# Patient Record
Sex: FEMALE | Race: OTHER | HISPANIC OR LATINO | Employment: OTHER | URBAN - METROPOLITAN AREA
[De-identification: names, ages, dates, MRNs, and addresses within clinical notes are randomized per-mention and may not be internally consistent; named-entity substitution may affect disease eponyms.]

---

## 2018-04-13 LAB
BILIRUB UR QL STRIP: 1 MG/DL
CLARITY UR: CLEAR
COLOR UR: YELLOW
COMMENT (HISTORICAL): ABNORMAL
GLUCOSE UR STRIP-MCNC: NEGATIVE MG/DL
HGB UR QL STRIP.AUTO: ABNORMAL
KETONES UR STRIP-MCNC: 50 MG/DL
LEUKOCYTE ESTERASE UR QL STRIP: 25
NITRITE UR QL STRIP: ABNORMAL
PH UR STRIP.AUTO: 8 [PH] (ref 4.5–8)
PREGNANCY TEST URINE (HISTORICAL): NEGATIVE
PROT UR STRIP-MCNC: 15 MG/DL
SP GR UR STRIP.AUTO: 1 (ref 1–1.04)
UROBILINOGEN UR QL STRIP.AUTO: NEGATIVE MG/DL (ref 0–1)

## 2018-07-29 ENCOUNTER — HOSPITAL ENCOUNTER (EMERGENCY)
Facility: HOSPITAL | Age: 43
Discharge: HOME | End: 2018-07-29
Attending: EMERGENCY MEDICINE | Admitting: EMERGENCY MEDICINE
Payer: MEDICARE

## 2018-07-29 VITALS
SYSTOLIC BLOOD PRESSURE: 120 MMHG | WEIGHT: 149 LBS | HEIGHT: 69 IN | DIASTOLIC BLOOD PRESSURE: 90 MMHG | BODY MASS INDEX: 22.07 KG/M2 | HEART RATE: 60 BPM | RESPIRATION RATE: 18 BRPM | OXYGEN SATURATION: 100 % | TEMPERATURE: 97.6 F

## 2018-07-29 DIAGNOSIS — R10.13 EPIGASTRIC PAIN: Primary | ICD-10-CM

## 2018-07-29 LAB
ALBUMIN SERPL BCP-MCNC: 4.1 G/DL (ref 3–5.2)
ALP SERPL-CCNC: 107 U/L (ref 43–122)
ALT SERPL W P-5'-P-CCNC: 32 U/L (ref 9–52)
ANION GAP SERPL CALCULATED.3IONS-SCNC: 9 MMOL/L (ref 5–14)
AST SERPL W P-5'-P-CCNC: 41 U/L (ref 14–36)
BILIRUB SERPL-MCNC: 0.4 MG/DL
BUN SERPL-MCNC: 13 MG/DL (ref 5–25)
CALCIUM SERPL-MCNC: 9.2 MG/DL (ref 8.4–10.2)
CHLORIDE SERPL-SCNC: 100 MMOL/L (ref 97–108)
CO2 SERPL-SCNC: 25 MMOL/L (ref 22–30)
CREAT SERPL-MCNC: 1.04 MG/DL (ref 0.6–1.2)
ERYTHROCYTE [DISTWIDTH] IN BLOOD BY AUTOMATED COUNT: 13.7 %
GFR SERPL CREATININE-BSD FRML MDRD: 67 ML/MIN/1.73SQ M
GLUCOSE SERPL-MCNC: 93 MG/DL (ref 70–99)
HCT VFR BLD AUTO: 42.4 % (ref 36–46)
HGB BLD-MCNC: 14.3 G/DL (ref 12–16)
LIPASE SERPL-CCNC: 168 U/L (ref 23–300)
LYMPHOCYTES # BLD AUTO: 13 % (ref 20–50)
LYMPHOCYTES # BLD AUTO: 2.22 THOUSAND/UL (ref 0.5–4)
MCH RBC QN AUTO: 33 PG (ref 26–34)
MCHC RBC AUTO-ENTMCNC: 33.8 G/DL (ref 31–36)
MCV RBC AUTO: 98 FL (ref 80–100)
MONOCYTES # BLD AUTO: 0.34 THOUSAND/UL (ref 0.2–0.9)
MONOCYTES NFR BLD AUTO: 2 % (ref 1–10)
NEUTS SEG # BLD: 14.54 THOUSAND/UL (ref 1.8–7.8)
NEUTS SEG NFR BLD AUTO: 85 %
PLATELET # BLD AUTO: 269 THOUSANDS/UL (ref 150–450)
PLATELET BLD QL SMEAR: ADEQUATE
PMV BLD AUTO: 8 FL (ref 8.9–12.7)
POTASSIUM SERPL-SCNC: 4.9 MMOL/L (ref 3.6–5)
PROT SERPL-MCNC: 7.3 G/DL (ref 5.9–8.4)
RBC # BLD AUTO: 4.35 MILLION/UL (ref 4–5.2)
RBC MORPH BLD: NORMAL
SODIUM SERPL-SCNC: 134 MMOL/L (ref 137–147)
TOTAL CELLS COUNTED SPEC: 100
WBC # BLD AUTO: 17.1 THOUSAND/UL (ref 4.5–11)

## 2018-07-29 PROCEDURE — 99284 EMERGENCY DEPT VISIT MOD MDM: CPT

## 2018-07-29 PROCEDURE — 96376 TX/PRO/DX INJ SAME DRUG ADON: CPT

## 2018-07-29 PROCEDURE — C9113 INJ PANTOPRAZOLE SODIUM, VIA: HCPCS | Performed by: EMERGENCY MEDICINE

## 2018-07-29 PROCEDURE — 96375 TX/PRO/DX INJ NEW DRUG ADDON: CPT

## 2018-07-29 PROCEDURE — 85027 COMPLETE CBC AUTOMATED: CPT | Performed by: EMERGENCY MEDICINE

## 2018-07-29 PROCEDURE — 80053 COMPREHEN METABOLIC PANEL: CPT | Performed by: EMERGENCY MEDICINE

## 2018-07-29 PROCEDURE — 96374 THER/PROPH/DIAG INJ IV PUSH: CPT

## 2018-07-29 PROCEDURE — 83690 ASSAY OF LIPASE: CPT | Performed by: EMERGENCY MEDICINE

## 2018-07-29 PROCEDURE — 85007 BL SMEAR W/DIFF WBC COUNT: CPT | Performed by: EMERGENCY MEDICINE

## 2018-07-29 PROCEDURE — 36415 COLL VENOUS BLD VENIPUNCTURE: CPT | Performed by: EMERGENCY MEDICINE

## 2018-07-29 PROCEDURE — 96361 HYDRATE IV INFUSION ADD-ON: CPT

## 2018-07-29 RX ORDER — METRONIDAZOLE 500 MG/1
500 TABLET ORAL 3 TIMES DAILY
COMMUNITY
End: 2019-06-13

## 2018-07-29 RX ORDER — MIRTAZAPINE 15 MG/1
15 TABLET, ORALLY DISINTEGRATING ORAL
COMMUNITY
End: 2019-08-24

## 2018-07-29 RX ORDER — TRAMADOL HYDROCHLORIDE 50 MG/1
50 TABLET ORAL EVERY 6 HOURS PRN
COMMUNITY
End: 2019-06-13

## 2018-07-29 RX ORDER — DICYCLOMINE HYDROCHLORIDE 10 MG/1
20 CAPSULE ORAL ONCE
Status: COMPLETED | OUTPATIENT
Start: 2018-07-29 | End: 2018-07-29

## 2018-07-29 RX ORDER — PANTOPRAZOLE SODIUM 40 MG/1
INJECTION, POWDER, FOR SOLUTION INTRAVENOUS
Status: DISCONTINUED
Start: 2018-07-29 | End: 2018-07-29 | Stop reason: HOSPADM

## 2018-07-29 RX ORDER — ONDANSETRON 2 MG/ML
4 INJECTION INTRAMUSCULAR; INTRAVENOUS ONCE
Status: COMPLETED | OUTPATIENT
Start: 2018-07-29 | End: 2018-07-29

## 2018-07-29 RX ORDER — DICYCLOMINE HYDROCHLORIDE 10 MG/1
CAPSULE ORAL
Status: DISCONTINUED
Start: 2018-07-29 | End: 2018-07-29 | Stop reason: HOSPADM

## 2018-07-29 RX ORDER — METOCLOPRAMIDE HYDROCHLORIDE 5 MG/ML
10 INJECTION INTRAMUSCULAR; INTRAVENOUS ONCE
Status: DISCONTINUED | OUTPATIENT
Start: 2018-07-29 | End: 2018-07-29 | Stop reason: HOSPADM

## 2018-07-29 RX ORDER — PANTOPRAZOLE SODIUM 40 MG/1
40 INJECTION, POWDER, FOR SOLUTION INTRAVENOUS ONCE
Status: COMPLETED | OUTPATIENT
Start: 2018-07-29 | End: 2018-07-29

## 2018-07-29 RX ORDER — DEXLANSOPRAZOLE 60 MG/1
60 CAPSULE, DELAYED RELEASE ORAL DAILY
COMMUNITY
End: 2019-06-13

## 2018-07-29 RX ORDER — MORPHINE SULFATE 4 MG/ML
4 INJECTION, SOLUTION INTRAMUSCULAR; INTRAVENOUS ONCE
Status: COMPLETED | OUTPATIENT
Start: 2018-07-29 | End: 2018-07-29

## 2018-07-29 RX ORDER — DICYCLOMINE HCL 20 MG
20 TABLET ORAL 4 TIMES DAILY
COMMUNITY
End: 2019-08-24

## 2018-07-29 RX ORDER — PROCHLORPERAZINE MALEATE 10 MG
10 TABLET ORAL EVERY 6 HOURS PRN
COMMUNITY
End: 2019-06-13

## 2018-07-29 RX ORDER — ONDANSETRON 2 MG/ML
INJECTION INTRAMUSCULAR; INTRAVENOUS
Status: DISCONTINUED
Start: 2018-07-29 | End: 2018-07-29 | Stop reason: HOSPADM

## 2018-07-29 RX ORDER — MORPHINE SULFATE 4 MG/ML
INJECTION, SOLUTION INTRAMUSCULAR; INTRAVENOUS
Status: DISCONTINUED
Start: 2018-07-29 | End: 2018-07-29 | Stop reason: HOSPADM

## 2018-07-29 RX ORDER — ZIPRASIDONE HYDROCHLORIDE 80 MG/1
80 CAPSULE ORAL
COMMUNITY
End: 2019-08-24

## 2018-07-29 RX ORDER — ALPRAZOLAM 1 MG/1
1 TABLET ORAL 3 TIMES DAILY PRN
COMMUNITY
End: 2019-06-13

## 2018-07-29 RX ORDER — METOCLOPRAMIDE 10 MG/1
10 TABLET ORAL EVERY 6 HOURS
Qty: 20 TABLET | Refills: 0 | Status: SHIPPED | OUTPATIENT
Start: 2018-07-29 | End: 2019-06-13

## 2018-07-29 RX ADMIN — DICYCLOMINE HYDROCHLORIDE 20 MG: 10 CAPSULE ORAL at 03:15

## 2018-07-29 RX ADMIN — ONDANSETRON 4 MG: 2 INJECTION INTRAMUSCULAR; INTRAVENOUS at 04:32

## 2018-07-29 RX ADMIN — PANTOPRAZOLE SODIUM 40 MG: 40 INJECTION, POWDER, FOR SOLUTION INTRAVENOUS at 03:15

## 2018-07-29 RX ADMIN — MORPHINE SULFATE 4 MG: 4 INJECTION, SOLUTION INTRAMUSCULAR; INTRAVENOUS at 03:22

## 2018-07-29 RX ADMIN — SODIUM CHLORIDE 1000 ML: 9 INJECTION, SOLUTION INTRAVENOUS at 03:07

## 2018-07-29 RX ADMIN — FAMOTIDINE 20 MG: 10 INJECTION INTRAVENOUS at 04:07

## 2018-07-29 RX ADMIN — ONDANSETRON 4 MG: 2 INJECTION, SOLUTION INTRAMUSCULAR; INTRAVENOUS at 03:15

## 2018-07-29 RX ADMIN — MORPHINE SULFATE 4 MG: 4 INJECTION, SOLUTION INTRAMUSCULAR; INTRAVENOUS at 04:33

## 2018-07-29 NOTE — ED PROVIDER NOTES
History  Chief Complaint   Patient presents with    Abdominal Pain     pt  reports abd  pain/nausea/vomiting for several days   pt  reports this has been going on since she was 26years old     Patient is a 53-year-old female with a significant past medical history of chronic gastritis who presents with acute exacerbation of her pain that started approximately 8 hours prior to arrival   Patient is complaining of a sharp 9/10 and epigastric pain does not radiate  Nothing worsened patient states that within the makes it better is when she comes to the ER and receives Zofran, Bentyl, morphine, and Pepcid  Patient states that she has had this pain since he was 25years old  She has seen multiple doctors in the past and is currently scheduled to see sleep doctors due to an elevated thyroid as well as chronic chest pain for which she is now scheduled for an echo and stress test   Patient states that over the last week her symptoms have gotten worse vomiting daily  Today pain he can became even more severe did not take any medications for it  Came to the ER for her medication as requested noted above  Patient denies any chest pain, shortness of breath, fever, chills, headache  States that is the same pain that she has always had since she was 21  Patient does is closed issues currently own p o  Flagyl for a bacterial vaginosis infection  No recent travel or questionable food exposure  Prior to Admission Medications   Prescriptions Last Dose Informant Patient Reported? Taking?    ALPRAZolam (XANAX) 1 mg tablet   Yes Yes   Sig: Take 1 mg by mouth 3 (three) times a day as needed for anxiety   Prenatal Vit-Fe Fumarate-FA (PRENATAL 19 PO)   Yes Yes   Sig: Take 1 tablet by mouth daily   dexlansoprazole (DEXILANT) 60 MG capsule   Yes Yes   Sig: Take 60 mg by mouth daily   dicyclomine (BENTYL) 20 mg tablet   Yes Yes   Sig: Take 20 mg by mouth 4 (four) times a day   metroNIDAZOLE (FLAGYL) 500 mg tablet   Yes Yes   Sig: Take 500 mg by mouth 3 (three) times a day   mirtazapine (REMERON SOL-TAB) 15 mg disintegrating tablet   Yes Yes   Sig: Take 15 mg by mouth daily at bedtime   prochlorperazine (COMPAZINE) 10 mg tablet   Yes Yes   Sig: Take 10 mg by mouth every 6 (six) hours as needed for nausea or vomiting   traMADol (ULTRAM) 50 mg tablet   Yes Yes   Sig: Take 50 mg by mouth every 6 (six) hours as needed for moderate pain   ziprasidone (GEODON) 80 mg capsule   Yes Yes   Sig: Take 80 mg by mouth daily at bedtime      Facility-Administered Medications: None       Past Medical History:   Diagnosis Date    Cardiomyopathy ([facility] [address] )     Chest pain     Diarrhea     Elevated transaminase measurement     History of vitamin D deficiency     Irritable bowel syndrome (IBS)     with Constipation    Malaise and fatigue     Nutritional deficiency     Overactive thyroid gland     Pain in joint involving multiple sites     Palpitations     Sweating abnormality     Vaginal infection     Vitamin D insufficiency        Past Surgical History:   Procedure Laterality Date    TUBAL LIGATION      UPPER GASTROINTESTINAL ENDOSCOPY         History reviewed  No pertinent family history  I have reviewed and agree with the history as documented  Social History   Substance Use Topics    Smoking status: Current Every Day Smoker     Packs/day: 0 50    Smokeless tobacco: Never Used    Alcohol use Yes      Comment: occasionally        Review of Systems   Constitutional: Positive for activity change  Negative for fever  HENT: Negative  Eyes: Negative  Respiratory: Negative  Negative for shortness of breath  Cardiovascular: Negative  Negative for chest pain  Gastrointestinal: Positive for abdominal pain, nausea and vomiting  Negative for diarrhea  Endocrine: Negative  Genitourinary: Negative  Musculoskeletal: Negative  Skin: Negative  Allergic/Immunologic: Negative  Neurological: Negative      Hematological: Negative  Psychiatric/Behavioral: Negative  All other systems reviewed and are negative  Physical Exam  Physical Exam   Constitutional: She is oriented to person, place, and time  She appears well-developed and well-nourished  Smells of tobacco   HENT:   Head: Normocephalic and atraumatic  Right Ear: External ear normal    Left Ear: External ear normal    Nose: Nose normal    Mouth/Throat: Oropharynx is clear and moist    Moist mucous membranes   Eyes: Conjunctivae are normal  Pupils are equal, round, and reactive to light  Neck: Normal range of motion  Neck supple  Cardiovascular: Normal rate, regular rhythm, normal heart sounds and intact distal pulses  Pulmonary/Chest: Effort normal and breath sounds normal    Abdominal: Soft  Bowel sounds are normal  She exhibits no distension and no mass  There is no tenderness  There is no rebound and no guarding  No hernia  Soft abdomen no guarding no rigidity  No point tenderness palpation      Musculoskeletal: Normal range of motion  Neurological: She is alert and oriented to person, place, and time  Skin: Skin is warm and dry  Capillary refill takes less than 2 seconds  Psychiatric: Her mood appears anxious  She is agitated  Intermediate  Nursing note and vitals reviewed        Vital Signs  ED Triage Vitals   Temperature Pulse Respirations Blood Pressure SpO2   07/29/18 0256 07/29/18 0256 07/29/18 0256 07/29/18 0256 07/29/18 0256   97 6 °F (36 4 °C) 63 (!) 24 (!) 173/101 100 %      Temp Source Heart Rate Source Patient Position - Orthostatic VS BP Location FiO2 (%)   07/29/18 0256 07/29/18 0325 07/29/18 0256 07/29/18 0256 --   Temporal Monitor Lying Left arm       Pain Score       07/29/18 0256       Worst Possible Pain           Vitals:    07/29/18 0256 07/29/18 0325 07/29/18 0412 07/29/18 0546   BP: (!) 173/101 (!) 170/111 131/81 120/90   Pulse: 63 71 65 60   Patient Position - Orthostatic VS: Lying Lying Lying Lying       Visual Acuity      ED Medications  Medications   pantoprazole (PROTONIX) 40 mg injection **AcuDose Override Pull** (  Not Given 7/29/18 0315)   dicyclomine (BENTYL) 10 mg capsule **AcuDose Override Pull** (  Not Given 7/29/18 0315)   ondansetron (ZOFRAN) 4 mg/2 mL injection **AcuDose Override Pull** (  Not Given 7/29/18 0315)   metoclopramide (REGLAN) injection 10 mg (10 mg Intravenous Not Given 7/29/18 0545)   morphine (PF) 4 mg/mL injection 4 mg (4 mg Intravenous Given 7/29/18 0322)   ondansetron (ZOFRAN) injection 4 mg (4 mg Intravenous Given 7/29/18 0315)   pantoprazole (PROTONIX) injection 40 mg (40 mg Intravenous Given 7/29/18 0315)   sodium chloride 0 9 % bolus 1,000 mL (0 mL Intravenous Stopped 7/29/18 0541)   dicyclomine (BENTYL) capsule 20 mg (20 mg Oral Given 7/29/18 0315)   famotidine (PEPCID) 20 mg/2 mL injection **AcuDose Override Pull** (20 mg Intravenous Given 7/29/18 0407)   morphine (PF) 4 mg/mL injection 4 mg (4 mg Intravenous Given 7/29/18 0433)   ondansetron (ZOFRAN) injection 4 mg (4 mg Intravenous Given 7/29/18 0432)       Diagnostic Studies  Results Reviewed     Procedure Component Value Units Date/Time    Lipase [[id number]]  (Normal) Collected:  07/29/18 0306    Lab Status:  Final result Specimen:  Blood from Arm, Right Updated:  07/29/18 0325     Lipase 168 u/L     Narrative:       Hemolysis    Comprehensive metabolic panel [[id number]]  (Abnormal) Collected:  07/29/18 0306    Lab Status:  Final result Specimen:  Blood from Arm, Right Updated:  07/29/18 0325     Sodium 134 (L) mmol/L      Potassium 4 9 mmol/L      Chloride 100 mmol/L      CO2 25 mmol/L      Anion Gap 9 mmol/L      BUN 13 mg/dL      Creatinine 1 04 mg/dL      Glucose 93 mg/dL      Calcium 9 2 mg/dL      AST 41 (H) U/L      ALT 32 U/L      Alkaline Phosphatase 107 U/L      Total Protein 7 3 g/dL      Albumin 4 1 g/dL      Total Bilirubin 0 40 mg/dL      eGFR 67 ml/min/1 73sq m     Narrative:       Hemolysis  National Kidney Disease Education Program recommendations are as follows:  GFR calculation is accurate only with a steady state creatinine  Chronic Kidney disease less than 60 ml/min/1 73 sq  meters  Kidney failure less than 15 ml/min/1 73 sq  meters  CBC and differential [[id number]]  (Abnormal) Collected:  07/29/18 0305    Lab Status:  Final result Specimen:  Blood from Arm, Right Updated:  07/29/18 0318     WBC 17 10 (H) Thousand/uL      RBC 4 35 Million/uL      Hemoglobin 14 3 g/dL      Hematocrit 42 4 %      MCV 98 fL      MCH 33 0 pg      MCHC 33 8 g/dL      RDW 13 7 %      MPV 8 0 (L) fL      Platelets 565 Thousands/uL                  No orders to display              Procedures  Procedures       Phone Contacts  ED Phone Contact    ED Course  ED Course as of Jul 29 0556   Sun Jul 29, 2018   5516 Patient feeling improved  Ready to go home  States no nausea medicine at home, insurance company will not pay for zofran, doesn't like compazine, agreeable with reglan  MDM  Number of Diagnoses or Management Options  Epigastric pain:   Diagnosis management comments: Patient is a 46-year-old female with a history of gastritis who presents with acute exacerbation her symptoms today  Patient with no point tenderness on exam   Labs reviewed mild elevation in her white count  Differential could include: At the but no point tenderness no fever no signs of exam on blood work to make me this primary diagnosis  Most likely gastritis, feeling improved after meds discharge with Reglan and follow up with her doctor return to the ER for any concerns         Amount and/or Complexity of Data Reviewed  Clinical lab tests: ordered and reviewed  Tests in the medicine section of CPT®: reviewed and ordered  Decide to obtain previous medical records or to obtain history from someone other than the patient: yes  Obtain history from someone other than the patient: yes  Review and summarize past medical records: yes  Independent visualization of images, tracings, or specimens: yes      CritCare Time    Disposition  Final diagnoses:   Epigastric pain     Time reflects when diagnosis was documented in both MDM as applicable and the Disposition within this note     Time User Action Codes Description Comment    7/29/2018  5:32 AM Silvia Estrada Add [R10 13] Epigastric pain       ED Disposition     ED Disposition Condition Comment    Discharge  Abelino Alfaro discharge to home/self care      Condition at discharge: Stable        Follow-up Information     Follow up With Specialties Details Why [address] [facility]   Norberto Vance [address]  [phone]            Discharge Medication List as of 7/29/2018  5:33 AM      START taking these medications    Details   metoclopramide (REGLAN) 10 mg tablet Take 1 tablet (10 mg total) by mouth every 6 (six) hours, Starting Sun 7/29/2018, Print         CONTINUE these medications which have NOT CHANGED    Details   ALPRAZolam (XANAX) 1 mg tablet Take 1 mg by mouth 3 (three) times a day as needed for anxiety, Historical Med      dexlansoprazole (DEXILANT) 60 MG capsule Take 60 mg by mouth daily, Historical Med      dicyclomine (BENTYL) 20 mg tablet Take 20 mg by mouth 4 (four) times a day, Historical Med      metroNIDAZOLE (FLAGYL) 500 mg tablet Take 500 mg by mouth 3 (three) times a day, Historical Med      mirtazapine (REMERON SOL-TAB) 15 mg disintegrating tablet Take 15 mg by mouth daily at bedtime, Historical Med      Prenatal Vit-Fe Fumarate-FA (PRENATAL 19 PO) Take 1 tablet by mouth daily, Historical Med      prochlorperazine (COMPAZINE) 10 mg tablet Take 10 mg by mouth every 6 (six) hours as needed for nausea or vomiting, Historical Med      traMADol (ULTRAM) 50 mg tablet Take 50 mg by mouth every 6 (six) hours as needed for moderate pain, Historical Med      ziprasidone (GEODON) 80 mg capsule Take 80 mg by mouth daily at bedtime, Historical Med           No discharge procedures on file      ED Provider  Electronically Signed by           Josi Sawyer MD  07/29/18 3735

## 2018-07-29 NOTE — ED NOTES
Patient medicated with pepcid as ordered  Reports that her pain is somewhat better but that the gas is what is causing her discomfort now       Gemini Cardoso, RICHARD  07/29/18 8314

## 2018-07-29 NOTE — ED NOTES
Patient removed her BP cuff and pulse oximetry probe and does not want it back on at this time  Patient was medicated with morphine and zofran (repeat dose) as ordered and Dr Kemi Woodall in to speak with patient       Radha Galaviz RN  07/29/18 9184

## 2018-07-29 NOTE — ED NOTES
Patient reports that she is feeling better and is asking when she can go home  Dr Emily Ferguson made aware and in to speak with patient prior to discharge       Jolena Kayser, RN  07/29/18 9153

## 2018-07-29 NOTE — DISCHARGE INSTRUCTIONS
Abdominal Pain   WHAT YOU NEED TO KNOW:   Abdominal pain can be dull, achy, or sharp  You may have pain in one area of your abdomen, or in your entire abdomen  Your pain may be caused by a condition such as constipation, food sensitivity or poisoning, infection, or a blockage  Abdominal pain can also be from a hernia, appendicitis, or an ulcer  Liver, gallbladder, or kidney conditions can also cause abdominal pain  The cause of your abdominal pain may be unknown  DISCHARGE INSTRUCTIONS:   Return to the emergency department if:   · You have new chest pain or shortness of breath  · You have pulsing pain in your upper abdomen or lower back that suddenly becomes constant  · Your pain is in the right lower abdominal area and worsens with movement  · You have a fever over 100 4°F (38°C) or shaking chills  · You are vomiting and cannot keep food or liquids down  · Your pain does not improve or gets worse over the next 8 to 12 hours  · You see blood in your vomit or bowel movements, or they look black and tarry  · Your skin or the whites of your eyes turn yellow  · You are a woman and have a large amount of vaginal bleeding that is not your monthly period  Contact your healthcare provider if:   · You have pain in your lower back  · You are a man and have pain in your testicles  · You have pain when you urinate  · You have questions or concerns about your condition or care  Follow up with your healthcare provider within 24 hours or as directed:  Write down your questions so you remember to ask them during your visits  Medicines:   · Medicines  may be given to calm your stomach and prevent vomiting or to decrease pain  Ask how to take pain medicine safely  · Take your medicine as directed  Contact your healthcare provider if you think your medicine is not helping or if you have side effects  Tell him of her if you are allergic to any medicine   Keep a list of the medicines, vitamins, and herbs you take  Include the amounts, and when and why you take them  Bring the list or the pill bottles to follow-up visits  Carry your medicine list with you in case of an emergency  © 2017 [address] [employer] Information is for End User's use only and may not be sold, redistributed or otherwise used for commercial purposes  All illustrations and images included in CareNotes® are the copyrighted property of [employer]  or Robbi Gallo  The above information is an  only  It is not intended as medical advice for individual conditions or treatments  Talk to your doctor, nurse or pharmacist before following any medical regimen to see if it is safe and effective for you

## 2018-07-29 NOTE — ED NOTES
Patient requesting pepcid - states she "needs pepcid"! Dr Virlinda Sever made aware - ordered - not in pyxis - supervisor obtaining same       Shaun Pineda RN  07/29/18 1537

## 2018-07-29 NOTE — ED NOTES
Patient up to use the bathroom- states that she is nauseous - passing flautus  While morphine being given, patient states that she didn't feel it - explained to patient medication being given slowly  Patient restless in bed       Saadia Shine RN  07/29/18 2647

## 2019-04-24 ENCOUNTER — HOSPITAL ENCOUNTER (EMERGENCY)
Facility: HOSPITAL | Age: 44
Discharge: HOME | End: 2019-04-24
Attending: EMERGENCY MEDICINE | Admitting: EMERGENCY MEDICINE
Payer: MEDICARE

## 2019-04-24 VITALS
WEIGHT: 159.61 LBS | BODY MASS INDEX: 23.57 KG/M2 | TEMPERATURE: 98.8 F | SYSTOLIC BLOOD PRESSURE: 148 MMHG | HEART RATE: 59 BPM | OXYGEN SATURATION: 99 % | DIASTOLIC BLOOD PRESSURE: 76 MMHG | RESPIRATION RATE: 16 BRPM

## 2019-04-24 DIAGNOSIS — K29.70 GASTRITIS: Primary | ICD-10-CM

## 2019-04-24 DIAGNOSIS — I10 HTN (HYPERTENSION): ICD-10-CM

## 2019-04-24 LAB
ALBUMIN SERPL BCP-MCNC: 4.3 G/DL (ref 3–5.2)
ALP SERPL-CCNC: 83 U/L (ref 43–122)
ALT SERPL W P-5'-P-CCNC: 28 U/L (ref 9–52)
ANION GAP SERPL CALCULATED.3IONS-SCNC: 10 MMOL/L (ref 5–14)
AST SERPL W P-5'-P-CCNC: 47 U/L (ref 14–36)
BASOPHILS # BLD AUTO: 0.13 THOUSAND/UL (ref 0–0.1)
BASOPHILS NFR MAR MANUAL: 1 % (ref 0–1)
BILIRUB SERPL-MCNC: 0.9 MG/DL
BILIRUB UR QL STRIP: NEGATIVE
BUN SERPL-MCNC: 11 MG/DL (ref 5–25)
CALCIUM SERPL-MCNC: 8.9 MG/DL (ref 8.4–10.2)
CHLORIDE SERPL-SCNC: 100 MMOL/L (ref 97–108)
CLARITY UR: ABNORMAL
CO2 SERPL-SCNC: 22 MMOL/L (ref 22–30)
COLOR UR: YELLOW
CREAT SERPL-MCNC: 0.59 MG/DL (ref 0.6–1.2)
ERYTHROCYTE [DISTWIDTH] IN BLOOD BY AUTOMATED COUNT: 13.5 %
EXT PREG TEST URINE: NORMAL
GFR SERPL CREATININE-BSD FRML MDRD: 113 ML/MIN/1.73SQ M
GLUCOSE SERPL-MCNC: 135 MG/DL (ref 70–99)
GLUCOSE UR STRIP-MCNC: NEGATIVE MG/DL
HCT VFR BLD AUTO: 47.7 % (ref 36–46)
HGB BLD-MCNC: 15.6 G/DL (ref 12–16)
HGB UR QL STRIP.AUTO: NEGATIVE
KETONES UR STRIP-MCNC: NEGATIVE MG/DL
LEUKOCYTE ESTERASE UR QL STRIP: NEGATIVE
LIPASE SERPL-CCNC: 116 U/L (ref 23–300)
LYMPHOCYTES # BLD AUTO: 1.61 THOUSAND/UL (ref 0.5–4)
LYMPHOCYTES # BLD AUTO: 12 % (ref 25–45)
MCH RBC QN AUTO: 32.7 PG (ref 26–34)
MCHC RBC AUTO-ENTMCNC: 32.7 G/DL (ref 31–36)
MCV RBC AUTO: 100 FL (ref 80–100)
MONOCYTES # BLD AUTO: 0.13 THOUSAND/UL (ref 0.2–0.9)
MONOCYTES NFR BLD AUTO: 1 % (ref 1–10)
NEUTS BAND NFR BLD MANUAL: 1 % (ref 0–8)
NEUTS SEG # BLD: 11.52 THOUSAND/UL (ref 1.8–7.8)
NEUTS SEG NFR BLD AUTO: 85 %
NITRITE UR QL STRIP: NEGATIVE
PH UR STRIP.AUTO: 8 [PH]
PLATELET # BLD AUTO: 233 THOUSANDS/UL (ref 150–450)
PLATELET BLD QL SMEAR: ADEQUATE
PMV BLD AUTO: 8.3 FL (ref 8.9–12.7)
POTASSIUM SERPL-SCNC: 4.9 MMOL/L (ref 3.6–5)
PROT SERPL-MCNC: 7.4 G/DL (ref 5.9–8.4)
PROT UR STRIP-MCNC: NEGATIVE MG/DL
RBC # BLD AUTO: 4.77 MILLION/UL (ref 4–5.2)
RBC MORPH BLD: NORMAL
SODIUM SERPL-SCNC: 132 MMOL/L (ref 137–147)
SP GR UR STRIP.AUTO: 1.01 (ref 1–1.04)
TOTAL CELLS COUNTED SPEC: 100
UROBILINOGEN UA: NEGATIVE MG/DL
WBC # BLD AUTO: 13.4 THOUSAND/UL (ref 4.5–11)

## 2019-04-24 PROCEDURE — 81025 URINE PREGNANCY TEST: CPT | Performed by: EMERGENCY MEDICINE

## 2019-04-24 PROCEDURE — 99284 EMERGENCY DEPT VISIT MOD MDM: CPT

## 2019-04-24 PROCEDURE — 96374 THER/PROPH/DIAG INJ IV PUSH: CPT

## 2019-04-24 PROCEDURE — 85007 BL SMEAR W/DIFF WBC COUNT: CPT | Performed by: EMERGENCY MEDICINE

## 2019-04-24 PROCEDURE — 99284 EMERGENCY DEPT VISIT MOD MDM: CPT | Performed by: EMERGENCY MEDICINE

## 2019-04-24 PROCEDURE — 83690 ASSAY OF LIPASE: CPT | Performed by: EMERGENCY MEDICINE

## 2019-04-24 PROCEDURE — 96361 HYDRATE IV INFUSION ADD-ON: CPT

## 2019-04-24 PROCEDURE — 85027 COMPLETE CBC AUTOMATED: CPT | Performed by: EMERGENCY MEDICINE

## 2019-04-24 PROCEDURE — 80053 COMPREHEN METABOLIC PANEL: CPT | Performed by: EMERGENCY MEDICINE

## 2019-04-24 PROCEDURE — 96375 TX/PRO/DX INJ NEW DRUG ADDON: CPT

## 2019-04-24 PROCEDURE — 36415 COLL VENOUS BLD VENIPUNCTURE: CPT | Performed by: EMERGENCY MEDICINE

## 2019-04-24 RX ORDER — ONDANSETRON 2 MG/ML
4 INJECTION INTRAMUSCULAR; INTRAVENOUS ONCE
Status: COMPLETED | OUTPATIENT
Start: 2019-04-24 | End: 2019-04-24

## 2019-04-24 RX ORDER — MORPHINE SULFATE 4 MG/ML
4 INJECTION, SOLUTION INTRAMUSCULAR; INTRAVENOUS ONCE
Status: COMPLETED | OUTPATIENT
Start: 2019-04-24 | End: 2019-04-24

## 2019-04-24 RX ORDER — DICYCLOMINE HCL 20 MG
20 TABLET ORAL ONCE
Status: COMPLETED | OUTPATIENT
Start: 2019-04-24 | End: 2019-04-24

## 2019-04-24 RX ORDER — FAMOTIDINE 40 MG/1
40 TABLET, FILM COATED ORAL
Qty: 20 TABLET | Refills: 0 | Status: SHIPPED | OUTPATIENT
Start: 2019-04-24 | End: 2019-06-13

## 2019-04-24 RX ADMIN — DICYCLOMINE HYDROCHLORIDE 20 MG: 20 TABLET ORAL at 08:17

## 2019-04-24 RX ADMIN — ONDANSETRON HYDROCHLORIDE 4 MG: 2 INJECTION, SOLUTION INTRAMUSCULAR; INTRAVENOUS at 08:17

## 2019-04-24 RX ADMIN — MORPHINE SULFATE 4 MG: 4 INJECTION INTRAVENOUS at 08:17

## 2019-04-24 RX ADMIN — FAMOTIDINE 20 MG: 10 INJECTION, SOLUTION INTRAVENOUS at 08:17

## 2019-04-24 RX ADMIN — SODIUM CHLORIDE 1000 ML: 9 INJECTION, SOLUTION INTRAVENOUS at 08:17

## 2019-06-13 ENCOUNTER — HOSPITAL ENCOUNTER (EMERGENCY)
Facility: HOSPITAL | Age: 44
Discharge: LEFT BEFORE DISCHARGE | End: 2019-06-13
Attending: EMERGENCY MEDICINE
Payer: MEDICARE

## 2019-06-13 VITALS
DIASTOLIC BLOOD PRESSURE: 87 MMHG | BODY MASS INDEX: 23.01 KG/M2 | RESPIRATION RATE: 17 BRPM | HEART RATE: 72 BPM | WEIGHT: 155.8 LBS | OXYGEN SATURATION: 99 % | TEMPERATURE: 97.5 F | SYSTOLIC BLOOD PRESSURE: 152 MMHG

## 2019-06-13 DIAGNOSIS — R07.89 ATYPICAL CHEST PAIN: Primary | ICD-10-CM

## 2019-06-13 LAB
ANION GAP SERPL CALCULATED.3IONS-SCNC: 11 MMOL/L (ref 5–14)
APTT PPP: 27 SECONDS (ref 23–34)
BASOPHILS # BLD AUTO: 0.1 THOUSANDS/ΜL (ref 0–0.1)
BASOPHILS NFR BLD AUTO: 1 % (ref 0–1)
BUN SERPL-MCNC: 12 MG/DL (ref 5–25)
CALCIUM SERPL-MCNC: 9.3 MG/DL (ref 8.4–10.2)
CHLORIDE SERPL-SCNC: 100 MMOL/L (ref 97–108)
CO2 SERPL-SCNC: 26 MMOL/L (ref 22–30)
CREAT SERPL-MCNC: 0.79 MG/DL (ref 0.6–1.2)
D-DIMER: 0.2 MG/L
EOSINOPHIL # BLD AUTO: 0.1 THOUSAND/ΜL (ref 0–0.4)
EOSINOPHIL NFR BLD AUTO: 1 % (ref 0–6)
ERYTHROCYTE [DISTWIDTH] IN BLOOD BY AUTOMATED COUNT: 13.2 %
GFR SERPL CREATININE-BSD FRML MDRD: 93 ML/MIN/1.73SQ M
GLUCOSE SERPL-MCNC: 71 MG/DL (ref 70–99)
HCT VFR BLD AUTO: 45.5 % (ref 36–46)
HGB BLD-MCNC: 15 G/DL (ref 12–16)
INR PPP: 0.96 (ref 0.89–1.1)
LIPASE SERPL-CCNC: 178 U/L (ref 23–300)
LYMPHOCYTES # BLD AUTO: 2.1 THOUSANDS/ΜL (ref 0.5–4)
LYMPHOCYTES NFR BLD AUTO: 16 % (ref 25–45)
MACROCYTES BLD QL AUTO: PRESENT
MCH RBC QN AUTO: 33.2 PG (ref 26–34)
MCHC RBC AUTO-ENTMCNC: 32.9 G/DL (ref 31–36)
MCV RBC AUTO: 101 FL (ref 80–100)
MONOCYTES # BLD AUTO: 0.6 THOUSAND/ΜL (ref 0.2–0.9)
MONOCYTES NFR BLD AUTO: 4 % (ref 1–10)
NEUTROPHILS # BLD AUTO: 10.4 THOUSANDS/ΜL (ref 1.8–7.8)
NEUTS SEG NFR BLD AUTO: 78 % (ref 45–65)
PLATELET # BLD AUTO: 304 THOUSANDS/UL (ref 150–450)
PLATELET BLD QL SMEAR: ADEQUATE
PMV BLD AUTO: 7.6 FL (ref 8.9–12.7)
POTASSIUM SERPL-SCNC: 4.9 MMOL/L (ref 3.6–5)
PROTHROMBIN TIME: 10.2 SECONDS (ref 9.5–11.6)
RBC # BLD AUTO: 4.5 MILLION/UL (ref 4–5.2)
RBC MORPH BLD: NORMAL
SODIUM SERPL-SCNC: 137 MMOL/L (ref 137–147)
TROPONIN I SERPL-MCNC: <0.01 NG/ML (ref 0–0.03)
WBC # BLD AUTO: 13.3 THOUSAND/UL (ref 4.5–11)

## 2019-06-13 PROCEDURE — 85610 PROTHROMBIN TIME: CPT | Performed by: EMERGENCY MEDICINE

## 2019-06-13 PROCEDURE — 85025 COMPLETE CBC W/AUTO DIFF WBC: CPT | Performed by: EMERGENCY MEDICINE

## 2019-06-13 PROCEDURE — 83690 ASSAY OF LIPASE: CPT | Performed by: EMERGENCY MEDICINE

## 2019-06-13 PROCEDURE — 99282 EMERGENCY DEPT VISIT SF MDM: CPT | Performed by: EMERGENCY MEDICINE

## 2019-06-13 PROCEDURE — 85730 THROMBOPLASTIN TIME PARTIAL: CPT | Performed by: EMERGENCY MEDICINE

## 2019-06-13 PROCEDURE — 85379 FIBRIN DEGRADATION QUANT: CPT | Performed by: EMERGENCY MEDICINE

## 2019-06-13 PROCEDURE — 36415 COLL VENOUS BLD VENIPUNCTURE: CPT | Performed by: EMERGENCY MEDICINE

## 2019-06-13 PROCEDURE — 93005 ELECTROCARDIOGRAM TRACING: CPT

## 2019-06-13 PROCEDURE — 84484 ASSAY OF TROPONIN QUANT: CPT | Performed by: EMERGENCY MEDICINE

## 2019-06-13 PROCEDURE — 99285 EMERGENCY DEPT VISIT HI MDM: CPT

## 2019-06-13 PROCEDURE — 80048 BASIC METABOLIC PNL TOTAL CA: CPT | Performed by: EMERGENCY MEDICINE

## 2019-06-13 RX ORDER — ASPIRIN 325 MG
325 TABLET ORAL ONCE
Status: COMPLETED | OUTPATIENT
Start: 2019-06-13 | End: 2019-06-13

## 2019-06-13 RX ADMIN — ASPIRIN 325 MG ORAL TABLET 325 MG: 325 PILL ORAL at 14:15

## 2019-06-14 LAB
ATRIAL RATE: 76 BPM
P AXIS: 44 DEGREES
PR INTERVAL: 132 MS
QRS AXIS: 10 DEGREES
QRSD INTERVAL: 90 MS
QT INTERVAL: 398 MS
QTC INTERVAL: 447 MS
T WAVE AXIS: 44 DEGREES
VENTRICULAR RATE: 76 BPM

## 2019-06-14 PROCEDURE — 93010 ELECTROCARDIOGRAM REPORT: CPT | Performed by: INTERNAL MEDICINE

## 2019-08-24 ENCOUNTER — HOSPITAL ENCOUNTER (EMERGENCY)
Facility: HOSPITAL | Age: 44
Discharge: HOME | End: 2019-08-24
Attending: EMERGENCY MEDICINE | Admitting: EMERGENCY MEDICINE
Payer: COMMERCIAL

## 2019-08-24 ENCOUNTER — APPOINTMENT (EMERGENCY)
Dept: RADIOLOGY | Facility: HOSPITAL | Age: 44
End: 2019-08-24
Payer: COMMERCIAL

## 2019-08-24 VITALS
SYSTOLIC BLOOD PRESSURE: 174 MMHG | DIASTOLIC BLOOD PRESSURE: 98 MMHG | TEMPERATURE: 99.2 F | HEART RATE: 62 BPM | BODY MASS INDEX: 23.02 KG/M2 | WEIGHT: 155.87 LBS | OXYGEN SATURATION: 97 % | RESPIRATION RATE: 17 BRPM

## 2019-08-24 DIAGNOSIS — F12.10 MARIJUANA ABUSE: ICD-10-CM

## 2019-08-24 DIAGNOSIS — R10.9 ABDOMINAL PAIN: ICD-10-CM

## 2019-08-24 DIAGNOSIS — F14.10 COCAINE ABUSE (HCC): ICD-10-CM

## 2019-08-24 DIAGNOSIS — R11.2 NAUSEA & VOMITING: Primary | ICD-10-CM

## 2019-08-24 DIAGNOSIS — R03.0 ELEVATED BLOOD PRESSURE READING: ICD-10-CM

## 2019-08-24 LAB
ALBUMIN SERPL BCP-MCNC: 4.9 G/DL (ref 3–5.2)
ALP SERPL-CCNC: 111 U/L (ref 43–122)
ALT SERPL W P-5'-P-CCNC: 25 U/L (ref 9–52)
AMORPH PHOS CRY URNS QL MICRO: ABNORMAL /HPF
AMPHETAMINES SERPL QL SCN: NEGATIVE
ANION GAP SERPL CALCULATED.3IONS-SCNC: 11 MMOL/L (ref 5–14)
AST SERPL W P-5'-P-CCNC: 28 U/L (ref 14–36)
BACTERIA UR QL AUTO: ABNORMAL /HPF
BARBITURATES UR QL: NEGATIVE
BASOPHILS # BLD AUTO: 0.1 THOUSAND/UL (ref 0–0.1)
BASOPHILS NFR MAR MANUAL: 1 % (ref 0–1)
BENZODIAZ UR QL: NEGATIVE
BILIRUB SERPL-MCNC: 1.1 MG/DL
BILIRUB UR QL STRIP: NEGATIVE
BUN SERPL-MCNC: 8 MG/DL (ref 5–25)
CALCIUM SERPL-MCNC: 9.6 MG/DL (ref 8.4–10.2)
CHLORIDE SERPL-SCNC: 100 MMOL/L (ref 97–108)
CK SERPL-CCNC: 81 U/L (ref 30–135)
CLARITY UR: CLEAR
CO2 SERPL-SCNC: 25 MMOL/L (ref 22–30)
COCAINE UR QL: POSITIVE
COLOR UR: ABNORMAL
CREAT SERPL-MCNC: 0.7 MG/DL (ref 0.6–1.2)
ERYTHROCYTE [DISTWIDTH] IN BLOOD BY AUTOMATED COUNT: 13.2 %
EXT PREG TEST URINE: NEGATIVE
EXT. CONTROL ED NAV: NORMAL
GFR SERPL CREATININE-BSD FRML MDRD: 107 ML/MIN/1.73SQ M
GLUCOSE SERPL-MCNC: 151 MG/DL (ref 70–99)
GLUCOSE UR STRIP-MCNC: NEGATIVE MG/DL
HCT VFR BLD AUTO: 44.9 % (ref 36–46)
HGB BLD-MCNC: 15.1 G/DL (ref 12–16)
HGB UR QL STRIP.AUTO: NEGATIVE
KETONES UR STRIP-MCNC: ABNORMAL MG/DL
LACTATE SERPL-SCNC: 1.8 MMOL/L (ref 0.7–2)
LEUKOCYTE ESTERASE UR QL STRIP: NEGATIVE
LIPASE SERPL-CCNC: 43 U/L (ref 23–300)
LYMPHOCYTES # BLD AUTO: 0.51 THOUSAND/UL (ref 0.5–4)
LYMPHOCYTES # BLD AUTO: 5 % (ref 25–45)
MAGNESIUM SERPL-MCNC: 2 MG/DL (ref 1.6–2.3)
MCH RBC QN AUTO: 32.9 PG (ref 26–34)
MCHC RBC AUTO-ENTMCNC: 33.6 G/DL (ref 31–36)
MCV RBC AUTO: 98 FL (ref 80–100)
METHADONE UR QL: NEGATIVE
MONOCYTES # BLD AUTO: 0.1 THOUSAND/UL (ref 0.2–0.9)
MONOCYTES NFR BLD AUTO: 1 % (ref 1–10)
MUCOUS THREADS UR QL AUTO: ABNORMAL
NEUTS SEG # BLD: 9.39 THOUSAND/UL (ref 1.8–7.8)
NEUTS SEG NFR BLD AUTO: 93 %
NITRITE UR QL STRIP: NEGATIVE
NON-SQ EPI CELLS URNS QL MICRO: ABNORMAL /HPF
OPIATES UR QL SCN: NEGATIVE
PCP UR QL: NEGATIVE
PH UR STRIP.AUTO: 8 [PH]
PLATELET # BLD AUTO: 273 THOUSANDS/UL (ref 150–450)
PLATELET BLD QL SMEAR: ADEQUATE
PMV BLD AUTO: 7.7 FL (ref 8.9–12.7)
POTASSIUM SERPL-SCNC: 3.8 MMOL/L (ref 3.6–5)
PROT SERPL-MCNC: 8.3 G/DL (ref 5.9–8.4)
PROT UR STRIP-MCNC: ABNORMAL MG/DL
RBC # BLD AUTO: 4.58 MILLION/UL (ref 4–5.2)
RBC #/AREA URNS AUTO: ABNORMAL /HPF
RBC MORPH BLD: NORMAL
SODIUM SERPL-SCNC: 136 MMOL/L (ref 137–147)
SP GR UR STRIP.AUTO: 1.01 (ref 1–1.04)
THC UR QL: POSITIVE
TOTAL CELLS COUNTED SPEC: 100
TROPONIN I SERPL-MCNC: <0.01 NG/ML (ref 0–0.03)
UROBILINOGEN UA: NEGATIVE MG/DL
WBC # BLD AUTO: 10.1 THOUSAND/UL (ref 4.5–11)
WBC #/AREA URNS AUTO: ABNORMAL /HPF

## 2019-08-24 PROCEDURE — 85027 COMPLETE CBC AUTOMATED: CPT | Performed by: EMERGENCY MEDICINE

## 2019-08-24 PROCEDURE — 81001 URINALYSIS AUTO W/SCOPE: CPT | Performed by: EMERGENCY MEDICINE

## 2019-08-24 PROCEDURE — 96374 THER/PROPH/DIAG INJ IV PUSH: CPT

## 2019-08-24 PROCEDURE — 81025 URINE PREGNANCY TEST: CPT | Performed by: EMERGENCY MEDICINE

## 2019-08-24 PROCEDURE — 82550 ASSAY OF CK (CPK): CPT | Performed by: EMERGENCY MEDICINE

## 2019-08-24 PROCEDURE — 36415 COLL VENOUS BLD VENIPUNCTURE: CPT | Performed by: EMERGENCY MEDICINE

## 2019-08-24 PROCEDURE — 99285 EMERGENCY DEPT VISIT HI MDM: CPT | Performed by: EMERGENCY MEDICINE

## 2019-08-24 PROCEDURE — 80053 COMPREHEN METABOLIC PANEL: CPT | Performed by: EMERGENCY MEDICINE

## 2019-08-24 PROCEDURE — 83690 ASSAY OF LIPASE: CPT | Performed by: EMERGENCY MEDICINE

## 2019-08-24 PROCEDURE — 84484 ASSAY OF TROPONIN QUANT: CPT | Performed by: EMERGENCY MEDICINE

## 2019-08-24 PROCEDURE — 80307 DRUG TEST PRSMV CHEM ANLYZR: CPT | Performed by: EMERGENCY MEDICINE

## 2019-08-24 PROCEDURE — 83605 ASSAY OF LACTIC ACID: CPT | Performed by: EMERGENCY MEDICINE

## 2019-08-24 PROCEDURE — 99284 EMERGENCY DEPT VISIT MOD MDM: CPT

## 2019-08-24 PROCEDURE — 96375 TX/PRO/DX INJ NEW DRUG ADDON: CPT

## 2019-08-24 PROCEDURE — 83735 ASSAY OF MAGNESIUM: CPT | Performed by: EMERGENCY MEDICINE

## 2019-08-24 PROCEDURE — 85007 BL SMEAR W/DIFF WBC COUNT: CPT | Performed by: EMERGENCY MEDICINE

## 2019-08-24 PROCEDURE — 96361 HYDRATE IV INFUSION ADD-ON: CPT

## 2019-08-24 PROCEDURE — 74022 RADEX COMPL AQT ABD SERIES: CPT

## 2019-08-24 RX ORDER — ONDANSETRON 2 MG/ML
4 INJECTION INTRAMUSCULAR; INTRAVENOUS ONCE
Status: COMPLETED | OUTPATIENT
Start: 2019-08-24 | End: 2019-08-24

## 2019-08-24 RX ORDER — KETOROLAC TROMETHAMINE 30 MG/ML
30 INJECTION, SOLUTION INTRAMUSCULAR; INTRAVENOUS ONCE
Status: COMPLETED | OUTPATIENT
Start: 2019-08-24 | End: 2019-08-24

## 2019-08-24 RX ORDER — ALPRAZOLAM 1 MG/1
1 TABLET ORAL 2 TIMES DAILY PRN
COMMUNITY
Start: 2019-08-08 | End: 2020-02-04 | Stop reason: HOSPADM

## 2019-08-24 RX ORDER — ZIPRASIDONE HYDROCHLORIDE 80 MG/1
80 CAPSULE ORAL
COMMUNITY
Start: 2019-08-08

## 2019-08-24 RX ORDER — DICYCLOMINE HCL 20 MG
20 TABLET ORAL 4 TIMES DAILY
COMMUNITY
Start: 2019-07-11 | End: 2021-11-23 | Stop reason: HOSPADM

## 2019-08-24 RX ORDER — DIPHENHYDRAMINE HYDROCHLORIDE 50 MG/ML
25 INJECTION INTRAMUSCULAR; INTRAVENOUS ONCE
Status: COMPLETED | OUTPATIENT
Start: 2019-08-24 | End: 2019-08-24

## 2019-08-24 RX ORDER — LORAZEPAM 1 MG/1
1 TABLET ORAL 2 TIMES DAILY
Qty: 4 TABLET | Refills: 0 | Status: SHIPPED | OUTPATIENT
Start: 2019-08-24 | End: 2021-11-23 | Stop reason: HOSPADM

## 2019-08-24 RX ORDER — LOPERAMIDE HYDROCHLORIDE 2 MG/1
2 CAPSULE ORAL ONCE
Status: COMPLETED | OUTPATIENT
Start: 2019-08-24 | End: 2019-08-24

## 2019-08-24 RX ORDER — MIRTAZAPINE 30 MG/1
30 TABLET, FILM COATED ORAL
COMMUNITY
Start: 2019-08-08

## 2019-08-24 RX ORDER — OLANZAPINE 5 MG/1
5 TABLET, ORALLY DISINTEGRATING ORAL ONCE
Status: COMPLETED | OUTPATIENT
Start: 2019-08-24 | End: 2019-08-24

## 2019-08-24 RX ORDER — METOCLOPRAMIDE HYDROCHLORIDE 5 MG/ML
10 INJECTION INTRAMUSCULAR; INTRAVENOUS ONCE
Status: COMPLETED | OUTPATIENT
Start: 2019-08-24 | End: 2019-08-24

## 2019-08-24 RX ORDER — LORAZEPAM 2 MG/ML
1 INJECTION INTRAMUSCULAR ONCE
Status: COMPLETED | OUTPATIENT
Start: 2019-08-24 | End: 2019-08-24

## 2019-08-24 RX ADMIN — FAMOTIDINE 20 MG: 10 INJECTION, SOLUTION INTRAVENOUS at 11:38

## 2019-08-24 RX ADMIN — METOCLOPRAMIDE 10 MG: 5 INJECTION, SOLUTION INTRAMUSCULAR; INTRAVENOUS at 11:32

## 2019-08-24 RX ADMIN — SODIUM CHLORIDE 1000 ML: 0.9 INJECTION, SOLUTION INTRAVENOUS at 11:32

## 2019-08-24 RX ADMIN — LORAZEPAM 1 MG: 2 INJECTION INTRAMUSCULAR; INTRAVENOUS at 13:17

## 2019-08-24 RX ADMIN — LOPERAMIDE HYDROCHLORIDE 2 MG: 2 CAPSULE ORAL at 12:04

## 2019-08-24 RX ADMIN — KETOROLAC TROMETHAMINE 30 MG: 30 INJECTION, SOLUTION INTRAMUSCULAR at 12:04

## 2019-08-24 RX ADMIN — DIPHENHYDRAMINE HYDROCHLORIDE 25 MG: 50 INJECTION INTRAMUSCULAR; INTRAVENOUS at 11:35

## 2019-08-24 RX ADMIN — OLANZAPINE 5 MG: 5 TABLET, ORALLY DISINTEGRATING ORAL at 12:52

## 2019-08-24 RX ADMIN — ONDANSETRON 4 MG: 2 INJECTION INTRAMUSCULAR; INTRAVENOUS at 12:47

## 2019-08-24 NOTE — ED PROVIDER NOTES
History  Chief Complaint   Patient presents with    Abdominal Pain     Pt has had abdominal pain since last night, she reports vomiting and diarrhea   Vomiting     Patient is a 44-year-old female with a history of irritable bowel syndrome, reported cardiomyopathy, palpitations, coming in today with reports of abdominal pain associated with nausea vomiting and diarrhea that started last night  Patient states she did not believe she ate anything out of the ordinary  She has started with abdominal pain described as diffuse cramping sharp discomfort with vomiting every 10 minutes  Patient denies any coffee-ground emesis or hematemesis  She denies any fevers, chills, recent travel, sick contacts  She denies any antibiotic use recently  She states approximately [facility] through the night she then started having diarrhea every 10 minutes as well  There is no melena or bright red blood per rectum  She reports that this is very similar to a IBS flares  She took Bentyl and Pepto-Bismol without relief  Patient states that she has a gastroenterologist but she cannot name the physician or when she last saw him  She states it has been several years  She has had colonoscopy and endoscopies but cannot tell me the results are when they were  She is unsure of her family doctor but does state that she goes to [address] [place] [address] for her PCP  Patient also reports that the past several weeks to months she has been having intermittent chest pain  She tells me she was here approximately 2 weeks ago (however was documented in June) that she was here for chest pain  She does have appointment this week her for her family doctor        History provided by:  Patient and EMS personnel   used: No    Abdominal Pain   Pain location:  Periumbilical  Pain quality: aching, bloating, cramping, pressure and sharp    Pain radiates to:  Does not radiate  Pain severity:  Moderate  Onset quality:  Gradual  Duration:  1 day  Timing:  Constant  Progression:  Unchanged  Chronicity:  Recurrent  Context: not alcohol use, not awakening from sleep, not diet changes, not eating, not laxative use, not medication withdrawal, not previous surgeries, not recent illness, not recent sexual activity, not recent travel, not retching, not sick contacts, not suspicious food intake and not trauma    Relieved by:  Nothing  Worsened by:  Nothing  Ineffective treatments: Bentyl, Pepto-Bismol  Associated symptoms: chest pain and nausea    Associated symptoms: no anorexia, no belching, no constipation, no dysuria, no fatigue, no fever, no flatus, no hematemesis, no hematochezia, no hematuria, no melena, no shortness of breath, no sore throat, no vaginal bleeding, no vaginal discharge and no vomiting    Chest pain:     Quality: pressure      Severity:  Mild    Onset quality:  Gradual    Timing:  Intermittent    Progression:  Waxing and waning    Chronicity:  Recurrent  Nausea:     Severity:  Mild    Onset quality:  Gradual    Timing:  Intermittent    Progression:  Waxing and waning  Risk factors: no alcohol abuse, not elderly, has not had multiple surgeries, no NSAID use, not obese, not pregnant and no recent hospitalization        Prior to Admission Medications   Prescriptions Last Dose Informant Patient Reported? Taking?    ALPRAZolam (XANAX) 1 mg tablet   Yes Yes   Sig: Take 1 mg by mouth 2 (two) times a day as needed   dicyclomine (BENTYL) 20 mg tablet   Yes Yes   Sig: Take 20 mg by mouth 4 (four) times a day   mirtazapine (REMERON SOL-TAB) 30 mg dispersible tablet   Yes Yes   Sig: Take 30 mg by mouth   ziprasidone (GEODON) 80 mg capsule   Yes Yes   Sig: Take 80 mg by mouth      Facility-Administered Medications: None       Past Medical History:   Diagnosis Date    Cardiomyopathy ([facility] [address] )     Chest pain     Diarrhea     Elevated transaminase measurement     History of vitamin D deficiency     Irritable bowel syndrome (IBS)     with Constipation    Malaise and fatigue     Nutritional deficiency     Overactive thyroid gland     Pain in joint involving multiple sites     Palpitations     Sweating abnormality     Vaginal infection     Vitamin D insufficiency        Past Surgical History:   Procedure Laterality Date    FINGER SURGERY      TUBAL LIGATION      UPPER GASTROINTESTINAL ENDOSCOPY         History reviewed  No pertinent family history  I have reviewed and agree with the history as documented  Social History     Tobacco Use    Smoking status: Current Every Day Smoker     Packs/day: 0 50    Smokeless tobacco: Never Used   Substance Use Topics    Alcohol use: Yes     Comment: occasionally    Drug use: Yes     Types: Marijuana, Cocaine     Comment: occasionally        Review of Systems   Constitutional: Negative for diaphoresis, fatigue and fever  HENT: Negative for ear pain and sore throat  Eyes: Negative for visual disturbance  Respiratory: Negative for chest tightness and shortness of breath  Cardiovascular: Positive for chest pain  Negative for palpitations  Gastrointestinal: Positive for abdominal pain and nausea  Negative for anorexia, constipation, flatus, hematemesis, hematochezia, melena and vomiting  Genitourinary: Negative for difficulty urinating, dysuria, hematuria, vaginal bleeding and vaginal discharge  Musculoskeletal: Negative for back pain and neck pain  Skin: Negative for rash  Neurological: Negative for weakness  Psychiatric/Behavioral: Negative for confusion  All other systems reviewed and are negative  Physical Exam  Physical Exam   Constitutional: She is oriented to person, place, and time  She appears well-developed and well-nourished  No distress  HENT:   Head: Normocephalic and atraumatic  Mouth/Throat: Oropharynx is clear and moist    Patient maintaining airway maintaining secretions  Uvula midline without edema   Eyes: Pupils are equal, round, and reactive to light   Conjunctivae and EOM are normal    Neck: Normal range of motion  Neck supple  Cardiovascular: Normal rate, regular rhythm, normal heart sounds and intact distal pulses  No murmur heard  Pulmonary/Chest: Effort normal and breath sounds normal  No stridor  No respiratory distress  Abdominal: Soft  Bowel sounds are normal  She exhibits no distension  There is generalized tenderness  Non peritoneal    Musculoskeletal: Normal range of motion  She exhibits no edema  Neurological: She is alert and oriented to person, place, and time  No cranial nerve deficit  No slurred speech  No facial asymmetry  Patient moves bilateral upper extremities and lower extremities independently of each other pain-free   Skin: Skin is warm  Capillary refill takes less than 2 seconds  She is not diaphoretic  Nursing note and vitals reviewed        Vital Signs  ED Triage Vitals   Temperature Pulse Respirations Blood Pressure SpO2   08/24/19 1152 08/24/19 1109 08/24/19 1109 08/24/19 1109 08/24/19 1109   99 2 °F (37 3 °C) 65 20 (!) 132/30 100 %      Temp src Heart Rate Source Patient Position - Orthostatic VS BP Location FiO2 (%)   -- 08/24/19 1109 08/24/19 1109 08/24/19 1109 --    Monitor Lying Left arm       Pain Score       08/24/19 1109       Worst Possible Pain           Vitals:    08/24/19 1152 08/24/19 1206 08/24/19 1315 08/24/19 1400   BP: (!) 191/102 (!) 171/98 (!) 172/103 (!) 174/98   Pulse:   60 62   Patient Position - Orthostatic VS:   Lying Lying         Visual Acuity      ED Medications  Medications   sodium chloride 0 9 % bolus 1,000 mL (0 mL Intravenous Stopped 8/24/19 1315)   metoclopramide (REGLAN) injection 10 mg (10 mg Intravenous Given 8/24/19 1132)   diphenhydrAMINE (BENADRYL) injection 25 mg (25 mg Intravenous Given 8/24/19 1135)   famotidine (PEPCID) injection 20 mg (20 mg Intravenous Given 8/24/19 1138)   ketorolac (TORADOL) injection 30 mg (30 mg Intravenous Given 8/24/19 1204)   loperamide (IMODIUM) capsule 2 mg (2 mg Oral Given 8/24/19 1204)   ondansetron (ZOFRAN) injection 4 mg (4 mg Intravenous Given 8/24/19 1247)   OLANZapine (ZyPREXA ZYDIS) dispersible tablet 5 mg (5 mg Oral Given 8/24/19 1252)   LORazepam (ATIVAN) 2 mg/mL injection 1 mg (1 mg Intravenous Given 8/24/19 1317)       Diagnostic Studies  Results Reviewed     Procedure Component Value Units Date/Time    Rapid drug screen, urine [[id number]]  (Abnormal) Collected:  08/24/19 1144    Lab Status:  Final result Specimen:  Urine, Clean Catch Updated:  08/24/19 1310     Amph/Meth UR Negative     Barbiturate Ur Negative     Benzodiazepine Urine Negative     Cocaine Urine Positive     Methadone Urine Negative     Opiate Urine Negative     PCP Ur Negative     THC Urine Positive    Narrative:       Presumptive report  If requested, specimen will be sent to reference lab for confirmation  FOR MEDICAL PURPOSES ONLY  IF CONFIRMATION NEEDED PLEASE CONTACT THE LAB WITHIN 5 DAYS      Drug Screen Cutoff Levels:  AMPHETAMINE/METHAMPHETAMINES  1000 ng/mL  BARBITURATES     200 ng/mL  BENZODIAZEPINES     200 ng/mL  COCAINE      300 ng/mL  METHADONE      300 ng/mL  OPIATES      300 ng/mL  PHENCYCLIDINE     25 ng/mL  THC       50 ng/mL      Urine Microscopic [[id number]]  (Abnormal) Collected:  08/24/19 1144    Lab Status:  Final result Specimen:  Urine, Clean Catch Updated:  08/24/19 1206     RBC, UA 0-1 /hpf      WBC, UA None Seen /hpf      Epithelial Cells Occasional /hpf      Bacteria, UA Occasional /hpf      AMORPH PHOSPATES Moderate /hpf      MUCUS THREADS Occasional    UA w Reflex to Microscopic [[id number]]  (Abnormal) Collected:  08/24/19 1144    Lab Status:  Final result Specimen:  Urine, Clean Catch Updated:  08/24/19 1158     Color, UA Straw     Clarity, UA Clear     Specific Gravity, UA 1 010     pH, UA 8 0     Leukocytes, UA Negative     Nitrite, UA Negative     Protein, UA 15 (Trace) mg/dl      Glucose, UA Negative mg/dl      Ketones, UA 50 (2+) mg/dl      Bilirubin, UA Negative     Blood, UA Negative     UROBILINOGEN UA Negative mg/dL     Troponin I [[id number]]  (Normal) Collected:  08/24/19 1127    Lab Status:  Final result Specimen:  Blood from Arm, Right Updated:  08/24/19 1156     Troponin I <0 01 ng/mL     Narrative:       Hemolysis    Magnesium [[id number]]  (Normal) Collected:  08/24/19 1127    Lab Status:  Final result Specimen:  Blood from Arm, Right Updated:  08/24/19 1148     Magnesium 2 0 mg/dL     Lipase [[id number]]  (Normal) Collected:  08/24/19 1127    Lab Status:  Final result Specimen:  Blood from Arm, Right Updated:  08/24/19 1148     Lipase 43 u/L     CK Total with Reflex CKMB [[id number]]  (Normal) Collected:  08/24/19 1127    Lab Status:  Final result Specimen:  Blood from Arm, Right Updated:  08/24/19 1148     Total CK 81 U/L     Comprehensive metabolic panel [[id number]]  (Abnormal) Collected:  08/24/19 1127    Lab Status:  Final result Specimen:  Blood from Arm, Right Updated:  08/24/19 1148     Sodium 136 mmol/L      Potassium 3 8 mmol/L      Chloride 100 mmol/L      CO2 25 mmol/L      ANION GAP 11 mmol/L      BUN 8 mg/dL      Creatinine 0 70 mg/dL      Glucose 151 mg/dL      Calcium 9 6 mg/dL      AST 28 U/L      ALT 25 U/L      Alkaline Phosphatase 111 U/L      Total Protein 8 3 g/dL      Albumin 4 9 g/dL      Total Bilirubin 1 10 mg/dL      eGFR 107 ml/min/1 73sq m     Narrative:       Cesar guidelines for Chronic Kidney Disease (CKD):     Stage 1 with normal or high GFR (GFR > 90 mL/min/1 73 square meters)    Stage 2 Mild CKD (GFR = 60-89 mL/min/1 73 square meters)    Stage 3A Moderate CKD (GFR = 45-59 mL/min/1 73 square meters)    Stage 3B Moderate CKD (GFR = 30-44 mL/min/1 73 square meters)    Stage 4 Severe CKD (GFR = 15-29 mL/min/1 73 square meters)    Stage 5 End Stage CKD (GFR <15 mL/min/1 73 square meters)  Note: GFR calculation is accurate only with a steady state creatinine    POCT pregnancy, urine [[id number]] (Normal) Resulted:  08/24/19 1146    Lab Status:  Final result Updated:  08/24/19 1146     EXT PREG TEST UR (Ref: Negative) negative     Control valid    Lactic acid, plasma [[id number]]  (Normal) Collected:  08/24/19 1127    Lab Status:  Final result Specimen:  Blood from Arm, Right Updated:  08/24/19 1144     LACTIC ACID 1 8 mmol/L     Narrative:       Result may be elevated if tourniquet was used during collection  CBC and differential [[id number]]  (Abnormal) Collected:  08/24/19 1127    Lab Status:  Final result Specimen:  Blood from Arm, Right Updated:  08/24/19 1144     WBC 10 10 Thousand/uL      RBC 4 58 Million/uL      Hemoglobin 15 1 g/dL      Hematocrit 44 9 %      MCV 98 fL      MCH 32 9 pg      MCHC 33 6 g/dL      RDW 13 2 %      MPV 7 7 fL      Platelets 315 Thousands/uL                  XR abdomen obstruction series   ED Interpretation by Renetta Camacho DO (08/24 1227)   No free air  Chest x-ray without acute pathology  No air-fluid levels  Procedures  Procedures       ED Course  ED Course as of Aug 24 1437   Sat Aug 24, 2019   1114 PATIENT IS A 45YEAR-OLD FEMALE COMING IN TODAY WITH DIFFUSE ABDOMINAL PAIN  ON EXAM SHE IS NEURO INTACT WITH NO FOCAL DEFICITS OF MILD PERITONEAL  PATIENT STATES HE FEELS SIMILAR TO HER IBS IN THE PAST  WILL REVIEW RECORDS  WILL ALSO START WITH BASIC LABS, OBSTRUCTION SERIES AS WELL AS REGLAN AND BENADRYL FOR NAUSEA AS WELL AS ABDOMINAL CRAMPING    Differential diagnosis includes but not limited to:  AAA, mesenteric ischemia, pancreatitis, cholecystitis, choledocholithiasis, hepatitis, bowel obstruction, ileus, gastroenteritis, colitis, malignancy, AAA, perforation, toxicologic poisoning, renal infarct, acute kidney injury, splenic infarct, splenic injury, nephrolithiasis, UTI, muscular strain, intra-abdominal hematoma    Portions of the record may have been created with voice recognition software   Occasional wrong word or "sound a like" substitutions may have occurred due to the inherent limitations of voice recognition software  Read the chart carefully and recognize, using context, where substitutions have occurred  1117 In review of [facility] chart, patient did have a nuclear stress test as well as echocardiogram of 2018  Nuclear stress was normal   Echo stress was normal as well with an EF of 50  There was no abnormalities of the atria or ventricles seen  It is noted patient was seen in June of this year but left AMA due to personal reasons  Patient had low risk  Patient has low wells the negative perc  Will not D-dimer  Will add on EKG and troponin  Reviewed [facility] records as well as [facility]  There is no documented colonoscopies or endoscopies  However, there was noted in 2010 documented irritable bowel syndrome  1150 Patient's labs thus far reveal no leukocytosis, anemia, end-organ damage  There is no anion gap acidosis  Lipase is stable  Lactate normal   Pending troponin  1201 Heart score 1 with negative troponin  Pending x-rays  1237 Patient updated on labs and x-ray  Patient still complains of diffuse discomfort  She remains non peritoneal   She has no active vomiting  Patient states she typically receives "cocktail is a medication"  I discussed with her that I did not see any documentation in the systems regarding her follow-up care with Gastroenterology  Will add on urine drug screen as well as on a give Zofran and Zyprexa for abdominal discomfort  Will avoid narcotics at this time      1354 After Ativan, patient resting comfortably in bed  Will plan for discharge home  Patient did remain hypertensive  Will continue to follow  She has no evidence of end-organ damage  1423 Patient resting comfortably  Will plan DC home              HEART Risk Score      Most Recent Value   History  0 Filed at: 08/24/2019 1201   ECG  0 Filed at: 08/24/2019 1201   Age  0 Filed at: 08/24/2019 1201   Risk Factors  1 Filed at: 08/24/2019 1201   Troponin  0 Filed at: 08/24/2019 1201   Heart Score Risk Calculator   History  0 Filed at: 08/24/2019 1201   ECG  0 Filed at: 08/24/2019 1201   Age  0 Filed at: 08/24/2019 1201   Risk Factors  1 Filed at: 08/24/2019 1201   Troponin  0 Filed at: 08/24/2019 1201   HEART Score  1 Filed at: 08/24/2019 1201   HEART Score  1 Filed at: 08/24/2019 1201            PERC Rule for PE      Most Recent Value   PERC Rule for PE   Age >=50  0 Filed at: 08/24/2019 1118   HR >=100  0 Filed at: 08/24/2019 1118   O2 Sat on room air < 95%  0 Filed at: 08/24/2019 1118   History of PE or DVT  0 Filed at: 08/24/2019 1118   Recent trauma or surgery  0 Filed at: 08/24/2019 1118   Hemoptysis  0 Filed at: 08/24/2019 1118   Exogenous estrogen  0 Filed at: 08/24/2019 1118   Unilateral leg swelling  0 Filed at: 08/24/2019 1118   PERC Rule for PE Results  0 Filed at: 08/24/2019 1118                Yesi Pan' Criteria for PE      Most Recent Value   Wells' Criteria for PE   Clinical signs and symptoms of DVT  0 Filed at: 08/24/2019 1118   PE is primary diagnosis or equally likely  0 Filed at: 08/24/2019 1118   HR >100  0 Filed at: 08/24/2019 1118   Immobilization at least 3 days or Surgery in the previous 4 weeks  0 Filed at: 08/24/2019 1118   Previous, objectively diagnosed PE or DVT  0 Filed at: 08/24/2019 1118   Hemoptysis  0 Filed at: 08/24/2019 1118   Malignancy with treatment within 6 months or palliative  0 Filed at: 08/24/2019 1118   Wells' Criteria Total  0 Filed at: 08/24/2019 1118            MDM  Number of Diagnoses or Management Options  Diagnosis management comments:     EKG INTERPRETATION 11:24 a m  RHYTHM:  Normal sinus rhythm at 60 beats per minute  AXIS:  Normal axis  INTERVALS:  SC interval measured at 120 milliseconds  QRS COMPLEX:  QRS measured at 86 milliseconds  ST SEGMENT:  Nonspecific ST segment changes  There is diffuse artifact  There is occasional PVCs    QT INTERVAL:  QTC measured at 478 milliseconds  COMPARED WITH PRIOR compared to old EKG on June 2019 there is diffuse artifact otherwise no acute change  Interpretation by Sushma Reyes, DO         Amount and/or Complexity of Data Reviewed  Clinical lab tests: ordered  Tests in the radiology section of CPT®: ordered  Tests in the medicine section of CPT®: ordered        Disposition  Final diagnoses:   Nausea & vomiting   Abdominal pain   Cocaine abuse ([facility] [address] )   Marijuana abuse   Elevated blood pressure reading     Time reflects when diagnosis was documented in both MDM as applicable and the Disposition within this note     Time User Action Codes Description Comment    8/24/2019  1:11 PM Bendock, Macey L Add [R11 2] Nausea & vomiting     8/24/2019  1:11 PM Bendock, Macey L Add [R10 9] Abdominal pain     8/24/2019  1:11 PM Bendock, Macey L Add [F14 10] Cocaine abuse ([facility] [address] )     8/24/2019  1:11 PM Bendock, Macey L Add [F12 10] Marijuana abuse     8/24/2019  1:54 PM Bendock, Macey L Add [R03 0] Elevated blood pressure reading       ED Disposition     ED Disposition Condition Date/Time Comment    Discharge Stable Sat Aug 24, 2019  1:54 PM Sultana Arriaga discharge to home/self care              Follow-up Information     Follow up With Specialties Details Why [address] [place] [address] Internal Medicine Schedule an appointment as soon as possible for a visit in 2 days  [address]  [phone]            Discharge Medication List as of 8/24/2019  2:24 PM      START taking these medications    Details   LORazepam (ATIVAN) 1 mg tablet Take 1 tablet (1 mg total) by mouth 2 (two) times a day for 2 days, Starting Sat 8/24/2019, Until Mon 8/26/2019, Print         CONTINUE these medications which have NOT CHANGED    Details   ALPRAZolam (XANAX) 1 mg tablet Take 1 mg by mouth 2 (two) times a day as needed, Starting Thu 8/8/2019, Historical Med      dicyclomine (BENTYL) 20 mg tablet Take 20 mg by mouth 4 (four) times a day, Starting u 7/11/2019, Historical Med      mirtazapine (REMERON SOL-TAB) 30 mg dispersible tablet Take 30 mg by mouth, Starting Thu 8/8/2019, Historical Med      ziprasidone (GEODON) 80 mg capsule Take 80 mg by mouth, Starting Th 8/8/2019, Historical Med           No discharge procedures on file      ED Provider  Electronically Signed by           Tereza Saleh,   08/24/19 1220 [facility],   08/24/19 [facility]

## 2020-02-03 ENCOUNTER — APPOINTMENT (EMERGENCY)
Dept: CT IMAGING | Facility: HOSPITAL | Age: 45
End: 2020-02-03
Payer: COMMERCIAL

## 2020-02-03 ENCOUNTER — HOSPITAL ENCOUNTER (EMERGENCY)
Facility: HOSPITAL | Age: 45
End: 2020-02-03
Attending: EMERGENCY MEDICINE | Admitting: EMERGENCY MEDICINE
Payer: COMMERCIAL

## 2020-02-03 VITALS
TEMPERATURE: 97.5 F | OXYGEN SATURATION: 97 % | HEART RATE: 65 BPM | SYSTOLIC BLOOD PRESSURE: 103 MMHG | WEIGHT: 165.34 LBS | DIASTOLIC BLOOD PRESSURE: 66 MMHG | BODY MASS INDEX: 24.42 KG/M2 | RESPIRATION RATE: 18 BRPM

## 2020-02-03 DIAGNOSIS — S27.0XXA TRAUMATIC FRACTURE OF RIBS WITH PNEUMOTHORAX, LEFT, CLOSED, INITIAL ENCOUNTER: ICD-10-CM

## 2020-02-03 DIAGNOSIS — S22.49XA MULTIPLE RIB FRACTURES: Primary | ICD-10-CM

## 2020-02-03 DIAGNOSIS — S22.42XA TRAUMATIC FRACTURE OF RIBS WITH PNEUMOTHORAX, LEFT, CLOSED, INITIAL ENCOUNTER: ICD-10-CM
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
## 2020-02-03 LAB
ALBUMIN SERPL BCP-MCNC: 3.8 G/DL (ref 3.5–5)
ALP SERPL-CCNC: 118 U/L (ref 46–116)
ALT SERPL W P-5'-P-CCNC: 26 U/L (ref 12–78)
ANION GAP SERPL CALCULATED.3IONS-SCNC: 8 MMOL/L (ref 4–13)
AST SERPL W P-5'-P-CCNC: 17 U/L (ref 5–45)
ATRIAL RATE: 81 BPM
BACTERIA UR QL AUTO: ABNORMAL /HPF
BASOPHILS # BLD AUTO: 0.05 THOUSANDS/ΜL (ref 0–0.1)
BASOPHILS NFR BLD AUTO: 0 % (ref 0–1)
BILIRUB SERPL-MCNC: 0.51 MG/DL (ref 0.2–1)
BILIRUB UR QL STRIP: NEGATIVE
BUN SERPL-MCNC: 11 MG/DL (ref 5–25)
CALCIUM SERPL-MCNC: 9.6 MG/DL (ref 8.3–10.1)
CHLORIDE SERPL-SCNC: 94 MMOL/L (ref 100–108)
CLARITY UR: CLEAR
CO2 SERPL-SCNC: 33 MMOL/L (ref 21–32)
COLOR UR: ABNORMAL
CREAT SERPL-MCNC: 0.89 MG/DL (ref 0.6–1.3)
EOSINOPHIL # BLD AUTO: 0.02 THOUSAND/ΜL (ref 0–0.61)
EOSINOPHIL NFR BLD AUTO: 0 % (ref 0–6)
ERYTHROCYTE [DISTWIDTH] IN BLOOD BY AUTOMATED COUNT: 11.9 % (ref 11.6–15.1)
EXT PREG TEST URINE: NEGATIVE
EXT. CONTROL ED NAV: NORMAL
GFR SERPL CREATININE-BSD FRML MDRD: 80 ML/MIN/1.73SQ M
GLUCOSE SERPL-MCNC: 116 MG/DL (ref 65–140)
GLUCOSE UR STRIP-MCNC: NEGATIVE MG/DL
HCT VFR BLD AUTO: 46 % (ref 34.8–46.1)
HGB BLD-MCNC: 15.4 G/DL (ref 11.5–15.4)
HGB UR QL STRIP.AUTO: ABNORMAL
HYALINE CASTS #/AREA URNS LPF: ABNORMAL /LPF
IMM GRANULOCYTES # BLD AUTO: 0.07 THOUSAND/UL (ref 0–0.2)
IMM GRANULOCYTES NFR BLD AUTO: 0 % (ref 0–2)
KETONES UR STRIP-MCNC: NEGATIVE MG/DL
LEUKOCYTE ESTERASE UR QL STRIP: NEGATIVE
LYMPHOCYTES # BLD AUTO: 2.81 THOUSANDS/ΜL (ref 0.6–4.47)
LYMPHOCYTES NFR BLD AUTO: 17 % (ref 14–44)
MCH RBC QN AUTO: 32.8 PG (ref 26.8–34.3)
MCHC RBC AUTO-ENTMCNC: 33.5 G/DL (ref 31.4–37.4)
MCV RBC AUTO: 98 FL (ref 82–98)
MONOCYTES # BLD AUTO: 0.98 THOUSAND/ΜL (ref 0.17–1.22)
MONOCYTES NFR BLD AUTO: 6 % (ref 4–12)
MUCOUS THREADS UR QL AUTO: ABNORMAL
NEUTROPHILS # BLD AUTO: 12.52 THOUSANDS/ΜL (ref 1.85–7.62)
NEUTS SEG NFR BLD AUTO: 77 % (ref 43–75)
NITRITE UR QL STRIP: NEGATIVE
NON-SQ EPI CELLS URNS QL MICRO: ABNORMAL /HPF
NRBC BLD AUTO-RTO: 0 /100 WBCS
P AXIS: 69 DEGREES
PH UR STRIP.AUTO: 8.5 [PH] (ref 4.5–8)
PLATELET # BLD AUTO: 309 THOUSANDS/UL (ref 149–390)
PMV BLD AUTO: 9.3 FL (ref 8.9–12.7)
POTASSIUM SERPL-SCNC: 3 MMOL/L (ref 3.5–5.3)
PR INTERVAL: 130 MS
PROT SERPL-MCNC: 7.9 G/DL (ref 6.4–8.2)
PROT UR STRIP-MCNC: ABNORMAL MG/DL
QRS AXIS: 65 DEGREES
QRSD INTERVAL: 92 MS
QT INTERVAL: 378 MS
QTC INTERVAL: 439 MS
RBC # BLD AUTO: 4.7 MILLION/UL (ref 3.81–5.12)
RBC #/AREA URNS AUTO: ABNORMAL /HPF
SODIUM SERPL-SCNC: 135 MMOL/L (ref 136–145)
SP GR UR STRIP.AUTO: 1.01 (ref 1–1.03)
T WAVE AXIS: 77 DEGREES
UROBILINOGEN UR QL STRIP.AUTO: 0.2 E.U./DL
VENTRICULAR RATE: 81 BPM
WBC # BLD AUTO: 16.45 THOUSAND/UL (ref 4.31–10.16)
WBC #/AREA URNS AUTO: ABNORMAL /HPF

## 2020-02-03 PROCEDURE — 80053 COMPREHEN METABOLIC PANEL: CPT | Performed by: PHYSICIAN ASSISTANT

## 2020-02-03 PROCEDURE — 96375 TX/PRO/DX INJ NEW DRUG ADDON: CPT

## 2020-02-03 PROCEDURE — 93010 ELECTROCARDIOGRAM REPORT: CPT | Performed by: INTERNAL MEDICINE

## 2020-02-03 PROCEDURE — 93005 ELECTROCARDIOGRAM TRACING: CPT

## 2020-02-03 PROCEDURE — 81025 URINE PREGNANCY TEST: CPT | Performed by: PHYSICIAN ASSISTANT

## 2020-02-03 PROCEDURE — 74177 CT ABD & PELVIS W/CONTRAST: CPT

## 2020-02-03 PROCEDURE — 71260 CT THORAX DX C+: CPT

## 2020-02-03 PROCEDURE — 99285 EMERGENCY DEPT VISIT HI MDM: CPT

## 2020-02-03 PROCEDURE — 99285 EMERGENCY DEPT VISIT HI MDM: CPT | Performed by: PHYSICIAN ASSISTANT

## 2020-02-03 PROCEDURE — 81001 URINALYSIS AUTO W/SCOPE: CPT

## 2020-02-03 PROCEDURE — 96374 THER/PROPH/DIAG INJ IV PUSH: CPT

## 2020-02-03 PROCEDURE — 85025 COMPLETE CBC W/AUTO DIFF WBC: CPT | Performed by: PHYSICIAN ASSISTANT

## 2020-02-03 PROCEDURE — 36415 COLL VENOUS BLD VENIPUNCTURE: CPT | Performed by: PHYSICIAN ASSISTANT

## 2020-02-03 RX ORDER — LIDOCAINE 50 MG/G
1 PATCH TOPICAL ONCE
Status: DISCONTINUED | OUTPATIENT
Start: 2020-02-03 | End: 2020-02-04 | Stop reason: HOSPADM

## 2020-02-03 RX ORDER — KETOROLAC TROMETHAMINE 30 MG/ML
15 INJECTION, SOLUTION INTRAMUSCULAR; INTRAVENOUS ONCE
Status: COMPLETED | OUTPATIENT
Start: 2020-02-03 | End: 2020-02-03

## 2020-02-03 RX ADMIN — LIDOCAINE 1 PATCH: 50 PATCH TOPICAL at 21:47

## 2020-02-03 RX ADMIN — IOHEXOL 100 ML: 350 INJECTION, SOLUTION INTRAVENOUS at 21:31

## 2020-02-03 RX ADMIN — KETOROLAC TROMETHAMINE 15 MG: 30 INJECTION, SOLUTION INTRAMUSCULAR at 21:48

## 2020-02-03 RX ADMIN — MORPHINE SULFATE 2 MG: 2 INJECTION, SOLUTION INTRAMUSCULAR; INTRAVENOUS at 20:39

## 2020-02-04 ENCOUNTER — APPOINTMENT (OUTPATIENT)
Dept: RADIOLOGY | Facility: HOSPITAL | Age: 45
End: 2020-02-04
Payer: COMMERCIAL

## 2020-02-04 ENCOUNTER — HOSPITAL ENCOUNTER (OUTPATIENT)
Facility: HOSPITAL | Age: 45
Setting detail: OBSERVATION
Discharge: HOME | End: 2020-02-04
Attending: SURGERY | Admitting: SURGERY
Payer: COMMERCIAL

## 2020-02-04 VITALS
RESPIRATION RATE: 18 BRPM | HEART RATE: 65 BPM | OXYGEN SATURATION: 95 % | DIASTOLIC BLOOD PRESSURE: 65 MMHG | SYSTOLIC BLOOD PRESSURE: 117 MMHG | TEMPERATURE: 98.1 F

## 2020-02-04 DIAGNOSIS — S22.42XD CLOSED FRACTURE OF MULTIPLE RIBS OF LEFT SIDE WITH ROUTINE HEALING, SUBSEQUENT ENCOUNTER: Primary | ICD-10-CM

## 2020-02-04 PROBLEM — S22.42XA CLOSED FRACTURE OF MULTIPLE RIBS OF LEFT SIDE: Status: ACTIVE | Noted: 2020-02-04
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
## 2020-02-04 PROBLEM — E87.6 HYPOKALEMIA: Status: ACTIVE | Noted: 2020-02-04

## 2020-02-04 PROBLEM — J93.9 PNEUMOTHORAX, LEFT: Status: ACTIVE | Noted: 2020-02-04

## 2020-02-04 LAB
ANION GAP SERPL CALCULATED.3IONS-SCNC: 6 MMOL/L (ref 4–13)
BUN SERPL-MCNC: 16 MG/DL (ref 5–25)
CALCIUM SERPL-MCNC: 8.1 MG/DL (ref 8.3–10.1)
CHLORIDE SERPL-SCNC: 100 MMOL/L (ref 100–108)
CO2 SERPL-SCNC: 29 MMOL/L (ref 21–32)
CREAT SERPL-MCNC: 0.75 MG/DL (ref 0.6–1.3)
ERYTHROCYTE [DISTWIDTH] IN BLOOD BY AUTOMATED COUNT: 12.1 % (ref 11.6–15.1)
GFR SERPL CREATININE-BSD FRML MDRD: 99 ML/MIN/1.73SQ M
GLUCOSE SERPL-MCNC: 103 MG/DL (ref 65–140)
HCT VFR BLD AUTO: 41.3 % (ref 34.8–46.1)
HGB BLD-MCNC: 14 G/DL (ref 11.5–15.4)
MCH RBC QN AUTO: 33.1 PG (ref 26.8–34.3)
MCHC RBC AUTO-ENTMCNC: 33.9 G/DL (ref 31.4–37.4)
MCV RBC AUTO: 98 FL (ref 82–98)
PLATELET # BLD AUTO: 263 THOUSANDS/UL (ref 149–390)
PMV BLD AUTO: 9.3 FL (ref 8.9–12.7)
POTASSIUM SERPL-SCNC: 3 MMOL/L (ref 3.5–5.3)
RBC # BLD AUTO: 4.23 MILLION/UL (ref 3.81–5.12)
SODIUM SERPL-SCNC: 135 MMOL/L (ref 136–145)
WBC # BLD AUTO: 11.06 THOUSAND/UL (ref 4.31–10.16)

## 2020-02-04 PROCEDURE — 99284 EMERGENCY DEPT VISIT MOD MDM: CPT | Performed by: SURGERY

## 2020-02-04 PROCEDURE — 80048 BASIC METABOLIC PNL TOTAL CA: CPT | Performed by: STUDENT IN AN ORGANIZED HEALTH CARE EDUCATION/TRAINING PROGRAM

## 2020-02-04 PROCEDURE — NC001 PR NO CHARGE: Performed by: PHYSICIAN ASSISTANT

## 2020-02-04 PROCEDURE — 71046 X-RAY EXAM CHEST 2 VIEWS: CPT

## 2020-02-04 PROCEDURE — 36415 COLL VENOUS BLD VENIPUNCTURE: CPT | Performed by: STUDENT IN AN ORGANIZED HEALTH CARE EDUCATION/TRAINING PROGRAM

## 2020-02-04 PROCEDURE — 99285 EMERGENCY DEPT VISIT HI MDM: CPT

## 2020-02-04 PROCEDURE — 85027 COMPLETE CBC AUTOMATED: CPT | Performed by: STUDENT IN AN ORGANIZED HEALTH CARE EDUCATION/TRAINING PROGRAM

## 2020-02-04 RX ORDER — MIRTAZAPINE 15 MG/1
7.5 TABLET, FILM COATED ORAL
Status: DISCONTINUED | OUTPATIENT
Start: 2020-02-04 | End: 2020-02-04

## 2020-02-04 RX ORDER — MIRTAZAPINE 30 MG/1
30 TABLET, FILM COATED ORAL
Status: DISCONTINUED | OUTPATIENT
Start: 2020-02-04 | End: 2020-02-04 | Stop reason: HOSPADM

## 2020-02-04 RX ORDER — ZIPRASIDONE HYDROCHLORIDE 40 MG/1
80 CAPSULE ORAL 2 TIMES DAILY WITH MEALS
Status: DISCONTINUED | OUTPATIENT
Start: 2020-02-04 | End: 2020-02-04 | Stop reason: HOSPADM

## 2020-02-04 RX ORDER — OXYCODONE HYDROCHLORIDE 10 MG/1
10 TABLET ORAL EVERY 4 HOURS PRN
Status: DISCONTINUED | OUTPATIENT
Start: 2020-02-04 | End: 2020-02-04 | Stop reason: HOSPADM

## 2020-02-04 RX ORDER — DICYCLOMINE HCL 20 MG
20 TABLET ORAL
Status: DISCONTINUED | OUTPATIENT
Start: 2020-02-04 | End: 2020-02-04 | Stop reason: HOSPADM

## 2020-02-04 RX ORDER — ZIPRASIDONE HYDROCHLORIDE 40 MG/1
80 CAPSULE ORAL 2 TIMES DAILY WITH MEALS
Status: DISCONTINUED | OUTPATIENT
Start: 2020-02-04 | End: 2020-02-04

## 2020-02-04 RX ORDER — POTASSIUM CHLORIDE 20 MEQ/1
40 TABLET, EXTENDED RELEASE ORAL ONCE
Status: COMPLETED | OUTPATIENT
Start: 2020-02-04 | End: 2020-02-04

## 2020-02-04 RX ORDER — MIRTAZAPINE 30 MG/1
30 TABLET, FILM COATED ORAL
Status: DISCONTINUED | OUTPATIENT
Start: 2020-02-04 | End: 2020-02-04

## 2020-02-04 RX ORDER — METHOCARBAMOL 500 MG/1
500 TABLET, FILM COATED ORAL 3 TIMES DAILY PRN
Qty: 30 TABLET | Refills: 0 | Status: SHIPPED | OUTPATIENT
Start: 2020-02-04 | End: 2021-11-22

## 2020-02-04 RX ORDER — NICOTINE 21 MG/24HR
1 PATCH, TRANSDERMAL 24 HOURS TRANSDERMAL DAILY
Status: DISCONTINUED | OUTPATIENT
Start: 2020-02-04 | End: 2020-02-04 | Stop reason: HOSPADM

## 2020-02-04 RX ORDER — OXYCODONE HYDROCHLORIDE 5 MG/1
5 TABLET ORAL EVERY 4 HOURS PRN
Status: DISCONTINUED | OUTPATIENT
Start: 2020-02-04 | End: 2020-02-04 | Stop reason: HOSPADM

## 2020-02-04 RX ORDER — ACETAMINOPHEN 325 MG/1
650 TABLET ORAL EVERY 4 HOURS PRN
Status: DISCONTINUED | OUTPATIENT
Start: 2020-02-04 | End: 2020-02-04 | Stop reason: HOSPADM

## 2020-02-04 RX ORDER — ALPRAZOLAM 0.5 MG/1
1 TABLET ORAL 2 TIMES DAILY PRN
Status: DISCONTINUED | OUTPATIENT
Start: 2020-02-04 | End: 2020-02-04 | Stop reason: HOSPADM

## 2020-02-04 RX ORDER — OXYCODONE HYDROCHLORIDE 5 MG/1
5 TABLET ORAL EVERY 6 HOURS PRN
Qty: 10 TABLET | Refills: 0 | Status: SHIPPED | OUTPATIENT
Start: 2020-02-04 | End: 2021-11-23 | Stop reason: HOSPADM

## 2020-02-04 RX ADMIN — POTASSIUM CHLORIDE 40 MEQ: 1500 TABLET, EXTENDED RELEASE ORAL at 08:36

## 2020-02-04 RX ADMIN — OXYCODONE HYDROCHLORIDE 5 MG: 5 TABLET ORAL at 11:05

## 2020-02-04 RX ADMIN — DICYCLOMINE HYDROCHLORIDE 20 MG: 20 TABLET ORAL at 08:36

## 2020-02-04 RX ADMIN — ACETAMINOPHEN 650 MG: 325 TABLET ORAL at 08:34

## 2020-02-04 RX ADMIN — ZIPRASIDONE HYDROCHLORIDE 80 MG: 40 CAPSULE ORAL at 02:26

## 2020-02-04 RX ADMIN — ACETAMINOPHEN 650 MG: 325 TABLET ORAL at 01:59

## 2020-02-04 RX ADMIN — OXYCODONE HYDROCHLORIDE 10 MG: 10 TABLET ORAL at 01:59

## 2020-02-04 RX ADMIN — MIRTAZAPINE 30 MG: 30 TABLET, FILM COATED ORAL at 03:28

## 2020-02-04 RX ADMIN — OXYCODONE HYDROCHLORIDE 10 MG: 10 TABLET ORAL at 07:02

## 2020-02-04 RX ADMIN — DICYCLOMINE HYDROCHLORIDE 20 MG: 20 TABLET ORAL at 11:05

## 2020-02-04 RX ADMIN — ALPRAZOLAM 1 MG: 0.5 TABLET ORAL at 01:58

## 2020-02-04 RX ADMIN — ENOXAPARIN SODIUM 30 MG: 30 INJECTION SUBCUTANEOUS at 08:36

## 2020-02-04 NOTE — ED NOTES
Report called to [facility]  in ED        Jimbo Goldstein RN  02/03/20 [id number]

## 2020-02-04 NOTE — UTILIZATION REVIEW
Initial Clinical Review     Transfer from [facility] ED  Pt at [place] from 2/3 thru   Admission: Date/Time/Statement: Observation  @ 0140  Orders Placed This Encounter   Procedures    Place in Observation     Standing Status:   Standing     Number of Occurrences:   1     Order Specific Question:   Admitting Physician     Answer:   Felicitas Apgar     Order Specific Question:   Level of Care     Answer:   Med Surg [16]     ED Arrival Information     Expected Arrival Acuity Means of Arrival Escorted By Service Admission Type    2020 00:14 [facility] Tra[facility]    Arrival Complaint    Fall        Chief Complaint   Patient presents with    Fall     Pt had a fall a few days ago and was seen at [facility]  Dx with Left rib fx and pneumo     Assessment/Plan:   45 y o  female who presents as a transfer from [facility]  She states that three days ago she was cleaning in her kitchen when she suddenly slipped on the wet floor and landed on her left side with her left arm underneath her body  The patient noted immediate pain in the left ribcage but went about her day, tolerating the pain  Patient states that she was taking Tylenol p m  For pain control as well as sleep medications at night  She states she could not take the pain anymore and decided to come to the emergency department today  CT scan of her chest revealed three rib fractures in ribs six through eight along with a tiny, lingular pneumothorax  Patient does endorse some mild shortness of breath  She smokes a pack cigarettes per day and drinks a few alcoholic drinks per week  PMH of  hypertension and her psychiatric history  Admit Observation level of care for S/p Fall, Left rib fractures 6-8 amd tiny lingular pneumothorax  CXR in am 2 and pain control      ED Triage Vitals   Temperature Pulse Respirations Blood Pressure SpO2   20 0015 20 0015 20 0015 20 0015 20 0015 98 2 °F (36 8 °C) 63 18 108/61 97 %      Temp Source Heart Rate Source Patient Position - Orthostatic VS BP Location FiO2 (%)   02/04/20 0015 02/04/20 0015 02/04/20 0015 02/04/20 0015 --   Oral Monitor Lying Right arm       Pain Score       02/04/20 0159       Worst Possible Pain        Wt Readings from Last 1 Encounters:   02/03/20 75 kg (165 lb 5 5 oz)     Additional Vital Signs:   02/04/20 0701  --  63  14  100/57  95 %  None (Room air)  --   02/04/20 0329  --  71  16  128/80  99 %  None (Room air)  --   02/04/20 0115  --  62  18  105/60           Pertinent Labs/Diagnostic Test Results:   2/3 CT Chest/Abd/Pelvis - Left minimally displaced 6-8 rib fractures with tiny left lower lobe lingular pneumothorax and small left pleural effusion      2/3 EKG - Normal sinus rhythm     Results from last 7 days   Lab Units 02/04/20 0701 02/03/20 2009   WBC Thousand/uL 11 06* 16 45*   HEMOGLOBIN g/dL 14 0 15 4   HEMATOCRIT % 41 3 46 0   PLATELETS Thousands/uL 263 309   NEUTROS ABS Thousands/µL  --  12 52*         Results from last 7 days   Lab Units 02/04/20 0701 02/03/20 2009   SODIUM mmol/L 135* 135*   POTASSIUM mmol/L 3 0* 3 0*   CHLORIDE mmol/L 100 94*   CO2 mmol/L 29 33*   ANION GAP mmol/L 6 8   BUN mg/dL 16 11   CREATININE mg/dL 0 75 0 89   EGFR ml/min/1 73sq m 99 80   CALCIUM mg/dL 8 1* 9 6     Results from last 7 days   Lab Units 02/03/20 2009   AST U/L 17   ALT U/L 26   ALK PHOS U/L 118*   TOTAL PROTEIN g/dL 7 9   ALBUMIN g/dL 3 8   TOTAL BILIRUBIN mg/dL 0 51         Results from last 7 days   Lab Units 02/04/20 0701 02/03/20 2009   GLUCOSE RANDOM mg/dL 103 116     Results from last 7 days   Lab Units 02/03/20 2048   CLARITY UA  Clear   COLOR UA  Carmel   SPEC GRAV UA  1 015   PH UA  8 5*   GLUCOSE UA mg/dl Negative   KETONES UA mg/dl Negative   BLOOD UA  Trace*   PROTEIN UA mg/dl Trace*   NITRITE UA  Negative   BILIRUBIN UA  Negative   UROBILINOGEN UA E U /dl 0 2   LEUKOCYTES UA  Negative   WBC UA /hpf None Seen   RBC UA /hpf 2-4*   BACTERIA UA /hpf Occasional   EPITHELIAL CELLS WET PREP /hpf Occasional   MUCUS THREADS  Occasional*     ED Treatment:   Medication Administration from 02/04/2020 0002 to 02/04/2020 0915       Date/Time Order Dose Route Action Action by Comments     02/04/2020 0836 enoxaparin (LOVENOX) subcutaneous injection 30 mg 30 mg Subcutaneous Given Marilyn Matt RN      64/52/0900 1305 acetaminophen (TYLENOL) tablet 650 mg 650 mg Oral Given Marilyn Matt RN      81/51/1464 0159 acetaminophen (TYLENOL) tablet 650 mg 650 mg Oral Given Aliyah Ruiz, RN      02/04/2020 0558 oxyCODONE (ROXICODONE) immediate release tablet 10 mg 10 mg Oral Given Wale Wharton RN      02/04/2020 0159 oxyCODONE (ROXICODONE) immediate release tablet 10 mg 10 mg Oral Given Aliyah Ruiz RN      02/04/2020 0158 ALPRAZolam Amelia Curly) tablet 1 mg 1 mg Oral Given Aliyah Ruiz RN      02/04/2020 0836 dicyclomine (BENTYL) tablet 20 mg 20 mg Oral Given Marilyn Matt, RN      61/30/3535 8168 ziprasidone (GEODON) capsule 80 mg 80 mg Oral Given Aliyah Ruiz RN      02/04/2020 0328 mirtazapine (REMERON) tablet 30 mg 30 mg Oral Given [facility], RN      02/04/2020 3875 potassium chloride (K-DUR,KLOR-CON) CR tablet 40 mEq 40 mEq Oral Given Marilyn Matt RN         Past Medical History:   Diagnosis Date    Cardiomyopathy [facility])     Chest pain     Diarrhea     Elevated transaminase measurement     History of vitamin D deficiency     Irritable bowel syndrome (IBS)     with Constipation    Malaise and fatigue     Nutritional deficiency     Overactive thyroid gland     Pain in joint involving multiple sites     Palpitations     Sweating abnormality     Vaginal infection     Vitamin D insufficiency      Present on Admission:  **None**      Admitting Diagnosis: Unspecified multiple injuries, initial encounter [T07  XXXA]  Age/Sex: 45 y o  female     Admission Orders:  Scheduled Medications:  Medications:  dicyclomine 20 mg Oral 4x Daily (AC & HS)   enoxaparin 30 mg Subcutaneous BID   mirtazapine 30 mg Oral HS   nicotine 1 patch Transdermal Daily   ziprasidone 80 mg Oral BID With Meals     Continuous IV Infusions:     PRN Meds:  acetaminophen 650 mg Oral Q4H PRN   ALPRAZolam 1 mg Oral BID PRN   oxyCODONE 10 mg Oral Q4H PRN   oxyCODONE 5 mg Oral Q4H PRN       Network Utilization Review Department  [email or web address]  ATTENTION: Please call with any questions or concerns to [phone] and carefully listen to the prompts so that you are directed to the right person  All voicemails are confidential   Stefany Shen all requests for admission clinical reviews, approved or denied determinations and any other requests to dedicated fax number below belonging to the campus where the patient is receiving treatment   List of dedicated fax numbers for the Facilities:  [address] DENIALS (Administrative/Medical Necessity) [phone]   [address] (Maternity/NICU/Pediatrics) [phone]   [facility] [phone]   [facility] [phone]   [facility] [phone]   Amarjit Bartonman [phone]   [address] [place] [address] [address] [place] [address] [phone]   [facility]  [phone]   [address] [facility] [address]  [address] [facility] [address] [facility] [phone]

## 2020-02-04 NOTE — EMTALA/ACUTE CARE TRANSFER
[facility]  [address] [facility] [address]  Dept: [phone]      EMTALA TRANSFER CONSENT    NAME Val Alvarez                                         1975                              MRN [id number]    I have been informed of my rights regarding examination, treatment, and transfer   by Dr Christian Calix: Specialized equipment and/or services available at the receiving facility (Include comment)________________________    Risks: Potential for delay in receiving treatment, Potential deterioration of medical condition, Loss of IV, Increased discomfort during transfer, Possible worsening of condition or death during transfer, Other: (Include comment)__________________________(tension pneumothorax, hemothorax)      Transfer Request   I acknowledge that my medical condition has been evaluated and explained to me by the emergency department physician or other qualified medical person and/or my attending physician who has recommended and offered to me further medical examination and treatment  I understand the Hospital's obligation with respect to the treatment and stabilization of my emergency medical condition  I nevertheless request to be transferred  I release the Hospital, the doctor, and any other persons caring for me from all responsibility or liability for any injury or ill effects that may result from my transfer and agree to accept all responsibility for the consequences of my choice to transfer, rather than receive stabilizing treatment at the Hospital  I understand that because the transfer is my request, my insurance may not provide reimbursement for the services  The Hospital will assist and direct me and my family in how to make arrangements for transfer, but the hospital is not liable for any fees charged by the transport service    In spite of this understanding, I refuse to consent to further medical examination and treatment which has been offered to me, and request transfer to  [address] : [address] [place], [place]  I authorize the performance of emergency medical procedures and treatments upon me in both transit and upon arrival at the receiving facility  Additionally, I authorize the release of any and all medical records to the receiving facility and request they be transported with me, if possible  I authorize the performance of emergency medical procedures and treatments upon me in both transit and upon arrival at the receiving facility  Additionally, I authorize the release of any and all medical records to the receiving facility and request they be transported with me, if possible  I understand that the safest mode of transportation during a medical emergency is an ambulance and that the Hospital advocates the use of this mode of transport  Risks of traveling to the receiving facility by car, including absence of medical control, life sustaining equipment, such as oxygen, and medical personnel has been explained to me and I fully understand them  (MIREYA CORRECT BOX BELOW)  [  ]  I consent to the stated transfer and to be transported by ambulance/helicopter  [  ]  I consent to the stated transfer, but refuse transportation by ambulance and accept full responsibility for my transportation by car    I understand the risks of non-ambulance transfers and I exonerate the Hospital and its staff from any deterioration in my condition that results from this refusal     X___________________________________________    DATE  20  TIME________  Signature of patient or legally responsible individual signing on patient behalf           RELATIONSHIP TO PATIENT_________________________          Provider Certification    NAME Leah DORSEY 1975                              MRN [id number]    A medical screening exam was performed on the above named patient  Based on the examination:    Condition Necessitating Transfer The primary encounter diagnosis was Multiple rib fractures  Diagnoses of Traumatic fracture of ribs with pneumothorax, left, closed, initial encounter and Fall, initial encounter were also pertinent to this visit  Patient Condition: The patient has been stabilized such that within reasonable medical probability, no material deterioration of the patient condition or the condition of the unborn child(phani) is likely to result from the transfer    Reason for Transfer: Level of Care needed not available at this facility    Transfer Requirements: [address] [place], [place]   · [facility] available and qualified personnel available for treatment as acknowledged by    · Agreed to accept transfer and to provide appropriate medical treatment as acknowledged by       Dr Ping Montenegro  · Appropriate medical records of the examination and treatment of the patient are provided at the time of transfer   [address] [facility], [address] _______  · Transfer will be performed by qualified personnel from    and appropriate transfer equipment as required, including the use of necessary and appropriate life support measures      Provider Certification: I have examined the patient and explained the following risks and benefits of being transferred/refusing transfer to the patient/family:  General risk, such as traffic hazards, adverse weather conditions, rough terrain or turbulence, possible failure of equipment (including vehicle or aircraft), or consequences of actions of persons outside the control of the transport personnel, Unanticipated needs of medical equipment and personnel during transport, Risk of worsening condition, The possibility of a transport vehicle being unavailable      Based on these reasonable risks and benefits to the patient and/or the unborn child(phani), and based upon the information available at the time of the patients examination, I certify that the medical benefits reasonably to be expected from the provision of appropriate medical treatments at another medical facility outweigh the increasing risks, if any, to the individuals medical condition, and in the case of labor to the unborn child, from effecting the transfer      X____________________________________________ DATE 02/03/20        TIME_______      ORIGINAL - SEND TO MEDICAL RECORDS   COPY - SEND WITH PATIENT DURING TRANSFER

## 2020-02-04 NOTE — DISCHARGE SUMMARY
Discharge Summary - Diana Ortiz 45 y o  female MRN: [id number]    Unit/Bed#: ED 14 Encounter: [id number]    Admission Date:   Admission Orders (From admission, onward)     Ordered        02/04/20 0140  Place in Observation  Once                     Admitting Diagnosis: Unspecified multiple injuries, initial encounter [T07  XXXA]    HPI: Patient is an otherwise healthy 46 y/o female who presented to [facility] as a trauma transfer from [facility] for evaluation and management of CT evidence reporting minimally displaced left rib fractures 6 through 8 and small lingular pneumothorax s/p fall  The patient presented to [facility] ED with a complaint of left-sided chest wall pain x3 days  She reports having a fall at home 3 days ago, stating that while she was cleaning her kitchen she slipped on a wet surface, landing on her left side and bracing the fall with her left arm tucked under her left chest wall  She denied any headache or loss of consciousness, and suffered no other injuries from the fall  She denies headache at present, as well as any chest pain, shortness of breath, and pain with deep breathing  She denies neck pain, back pain, extremity numbness or weakness  She reports that her pain has been well controlled with current regimen and is requesting discharge to home  Procedures Performed: No orders of the defined types were placed in this encounter  Summary of Hospital Course:  Patient's hospital course was unremarkable without development of any new or worsening symptoms  The patient was noted to have hypokalemia on AM BMP which was repleted with oral KCl  Her vital signs remained stable with oxygen saturation above 95% on room air, and no complaint of shortness of breath or evidence of respiratory distress  Her pain has been well controlled with current regimen, and she actively adhered to rib fracture protocol   She ambulated in the ED without difficulty  Repeat chest x-ray performed today reported left-sided rib fractures and resolution of lingular pneumothorax  She has been medically cleared for discharge home with plan for analgesia control and outpatient follow up in Trauma clinic  She is aware that she should not drive until cleared at follow up visit  Patient is comfortable and agreeable with discharge pain with scheduled outpatient follow up  Significant Findings, Care, Treatment and Services Provided:   CXR 2/4/2020 IMPRESSION:  1  Left-sided rib fractures with underlying left basilar atelectasis and tiny effusion  No pneumothorax appreciated  2   Otherwise unremarkable study  CT C/A/P 2/3/2020 IMPRESSION:  Left minimally displaced 6-8 rib fractures with tiny left lower lobe lingular pneumothorax and small left pleural effusion  Complications: None    Discharge Diagnosis: Fall; minimally displaced left rib fractures 6-8; left pleural effusion    Resolved Problems  Date Reviewed: 2/4/2020    None          Condition at Discharge: good       Discharge instructions/Information to patient and family:   See after visit summary for information provided to patient and family  Provisions for Follow-Up Care:  See after visit summary for information related to follow-up care and any pertinent home health orders  PCP: John Paul Mireles    Disposition: Home    Planned Readmission: No      Discharge Statement   I spent 10 minutes discharging the patient  This time was spent on the day of discharge  I had direct contact with the patient on the day of discharge  Additional documentation is required if more than 30 minutes were spent on discharge  Discharge Medications:  See after visit summary for reconciled discharge medications provided to patient and family

## 2020-02-04 NOTE — ASSESSMENT & PLAN NOTE
- Mechanical fall   Patient ambulatory in ED with no significant gait disturbance  - Monitor vitals, no orthostatic hypotension  - PT/OT eval

## 2020-02-04 NOTE — ED PROVIDER NOTES
History  Chief Complaint   Patient presents with    Rib Pain     pt reports she fell 2-3 days ago from standing and hurt her L side of her ribs  pt reports L sided rib pain  56-year-old female with a history of peripartum cardiomyopathy, IBS presents emergency department for evaluation after mechanical fall 2 days ago  Patient states she was cleaning something in when she fell landing on her left side  Patient reports left-sided rib pain since then  She denies any left arm pain  She endorses some shortness of breath, chest pain, abdominal pain, nausea, 2 episodes of nonbilious nonbloody emesis  Patient denies any head strike  Patient denies any diarrhea, or constipation  Patient reports taking Tylenol and Motrin, without relief  Patient reports the pain kept her awake last night  Patient denies any HI/SI  Chest Pain   Pain location:  L chest  Pain quality: sharp    Pain radiates to:  Does not radiate  Pain radiates to the back: no    Pain severity:  Severe  Onset quality:  Sudden  Duration:  2 days  Timing:  Constant  Progression:  Unchanged  Context: breathing, movement, raising an arm and trauma    Context: no drug use    Relieved by:  Certain positions  Worsened by:  Certain positions, movement and deep breathing  Ineffective treatments:  Rest and certain positions  Associated symptoms: abdominal pain, nausea, shortness of breath and vomiting    Associated symptoms: no altered mental status, no anorexia, no back pain, no cough, no diaphoresis, no dizziness, no fatigue, no fever, no headache, no lower extremity edema, no near-syncope, no numbness, no palpitations and no weakness    Risk factors: smoking    Risk factors: no immobilization, not obese, no prior DVT/PE and no surgery        Prior to Admission Medications   Prescriptions Last Dose Informant Patient Reported? Taking?    ALPRAZolam (XANAX) 1 mg tablet   Yes Yes   Sig: Take 1 mg by mouth 2 (two) times a day as needed   LORazepam (ATIVAN) 1 mg tablet   No No   Sig: Take 1 tablet (1 mg total) by mouth 2 (two) times a day for 2 days   dicyclomine (BENTYL) 20 mg tablet   Yes Yes   Sig: Take 20 mg by mouth 4 (four) times a day   mirtazapine (REMERON SOL-TAB) 30 mg dispersible tablet   Yes Yes   Sig: Take 30 mg by mouth   ziprasidone (GEODON) 80 mg capsule   Yes Yes   Sig: Take 80 mg by mouth      Facility-Administered Medications: None       Past Medical History:   Diagnosis Date    Cardiomyopathy ([address] )     Chest pain     Diarrhea     Elevated transaminase measurement     History of vitamin D deficiency     Irritable bowel syndrome (IBS)     with Constipation    Malaise and fatigue     Nutritional deficiency     Overactive thyroid gland     Pain in joint involving multiple sites     Palpitations     Sweating abnormality     Vaginal infection     Vitamin D insufficiency        Past Surgical History:   Procedure Laterality Date    FINGER SURGERY      TUBAL LIGATION      UPPER GASTROINTESTINAL ENDOSCOPY         History reviewed  No pertinent family history  I have reviewed and agree with the history as documented  Social History     Tobacco Use    Smoking status: Current Every Day Smoker     Packs/day: 0 50    Smokeless tobacco: Never Used   Substance Use Topics    Alcohol use: Yes     Comment: occasionally    Drug use: Yes     Types: Marijuana     Comment: occasionally        Review of Systems   Constitutional: Negative for chills, diaphoresis, fatigue and fever  HENT: Negative for congestion and sore throat  Eyes: Negative for visual disturbance  Respiratory: Positive for shortness of breath  Negative for cough and wheezing  Cardiovascular: Positive for chest pain  Negative for palpitations, leg swelling and near-syncope  Gastrointestinal: Positive for abdominal pain, constipation, nausea and vomiting  Negative for anorexia and diarrhea     Genitourinary: Negative for decreased urine volume, dysuria and hematuria  Musculoskeletal: Negative for back pain, neck pain and neck stiffness  Skin: Negative for pallor, rash and wound  Neurological: Negative for dizziness, weakness, numbness and headaches  All other systems reviewed and are negative  Physical Exam  Physical Exam   Constitutional: She is oriented to person, place, and time  Vital signs are normal  She appears well-developed and well-nourished  Non-toxic appearance  No distress  Anxious secondary to pain  HENT:   Head: Normocephalic and atraumatic  Right Ear: Hearing, tympanic membrane, external ear and ear canal normal  No hemotympanum  Left Ear: Hearing, tympanic membrane, external ear and ear canal normal  No hemotympanum  Nose: Nose normal  No mucosal edema  Mouth/Throat: Uvula is midline, oropharynx is clear and moist and mucous membranes are normal  No posterior oropharyngeal edema or posterior oropharyngeal erythema  Eyes: Pupils are equal, round, and reactive to light  Conjunctivae and EOM are normal    Neck: Normal range of motion and full passive range of motion without pain  Neck supple  No spinous process tenderness present  Normal range of motion present  Cardiovascular: Normal rate, regular rhythm, S1 normal, S2 normal and normal heart sounds  No murmur heard  Pulmonary/Chest: Effort normal and breath sounds normal  No accessory muscle usage  No respiratory distress  She has no decreased breath sounds  She has no wheezes  She has no rhonchi  Abdominal: Soft  Bowel sounds are normal  There is tenderness in the left upper quadrant  There is no rigidity, no rebound, no guarding and no CVA tenderness  Musculoskeletal:        Thoracic back: She exhibits tenderness and pain  She exhibits no bony tenderness  Back:         Arms:  2+ radial pulses bilaterally  No midline tenderness to palpation along the cervical, thoracic, lumbar spine  No ecchymosis visualized    No ecchymosis visualized to the left chest wall or abdomen  Lymphadenopathy:     She has no cervical adenopathy  Neurological: She is alert and oriented to person, place, and time  No cranial nerve deficit  GCS eye subscore is 4  GCS verbal subscore is 5  GCS motor subscore is 6  Skin: Skin is warm and dry  Capillary refill takes less than 2 seconds  No abrasion, no bruising, no burn, no ecchymosis, no laceration, no lesion, no petechiae and no rash noted  No erythema  Nursing note and vitals reviewed        Vital Signs  ED Triage Vitals   Temperature Pulse Respirations Blood Pressure SpO2   02/03/20 1837 02/03/20 1839 02/03/20 1837 02/03/20 1839 02/03/20 1837   97 5 °F (36 4 °C) 95 18 146/95 98 %      Temp src Heart Rate Source Patient Position - Orthostatic VS BP Location FiO2 (%)   -- 02/03/20 2038 02/03/20 2038 02/03/20 2038 --    Monitor Lying Right arm       Pain Score       02/03/20 1837       8           Vitals:    02/03/20 1839 02/03/20 2038 02/03/20 2120 02/03/20 2246   BP: 146/95 113/79 102/70 103/66   Pulse: 95 81 72 65   Patient Position - Orthostatic VS:  Lying Lying Lying         Visual Acuity      ED Medications  Medications   morphine injection 2 mg (2 mg Intravenous Given 2/3/20 2039)   iohexol (OMNIPAQUE) 350 MG/ML injection (MULTI-DOSE) 100 mL (100 mL Intravenous Given 2/3/20 2131)   ketorolac (TORADOL) injection 15 mg (15 mg Intravenous Given 2/3/20 2148)       Diagnostic Studies  Results Reviewed     Procedure Component Value Units Date/Time    Urine Microscopic [[id number]]  (Abnormal) Collected:  02/03/20 2048    Lab Status:  Final result Specimen:  Urine, Clean Catch Updated:  02/03/20 2128     RBC, UA 2-4 /hpf      WBC, UA None Seen /hpf      Epithelial Cells Occasional /hpf      Bacteria, UA Occasional /hpf      Hyaline Casts, UA 0-1 /lpf      MUCUS THREADS Occasional    Comprehensive metabolic panel [[id number]]  (Abnormal) Collected:  02/03/20 2009    Lab Status:  Final result Specimen:  Blood from Arm, Right Updated:  02/03/20 2057     Sodium 135 mmol/L      Potassium 3 0 mmol/L      Chloride 94 mmol/L      CO2 33 mmol/L      ANION GAP 8 mmol/L      BUN 11 mg/dL      Creatinine 0 89 mg/dL      Glucose 116 mg/dL      Calcium 9 6 mg/dL      AST 17 U/L      ALT 26 U/L      Alkaline Phosphatase 118 U/L      Total Protein 7 9 g/dL      Albumin 3 8 g/dL      Total Bilirubin 0 51 mg/dL      eGFR 80 ml/min/1 73sq m     Narrative:       National Kidney Disease Foundation guidelines for Chronic Kidney Disease (CKD):     Stage 1 with normal or high GFR (GFR > 90 mL/min/1 73 square meters)    Stage 2 Mild CKD (GFR = 60-89 mL/min/1 73 square meters)    Stage 3A Moderate CKD (GFR = 45-59 mL/min/1 73 square meters)    Stage 3B Moderate CKD (GFR = 30-44 mL/min/1 73 square meters)    Stage 4 Severe CKD (GFR = 15-29 mL/min/1 73 square meters)    Stage 5 End Stage CKD (GFR <15 mL/min/1 73 square meters)  Note: GFR calculation is accurate only with a steady state creatinine    POCT urinalysis dipstick [[id number]]  (Abnormal) Resulted:  02/03/20 2050    Lab Status:  Final result Specimen:  Urine Updated:  02/03/20 2050    Urine Macroscopic, POC [[id number]]  (Abnormal) Collected:  02/03/20 2048    Lab Status:  Final result Specimen:  Urine Updated:  02/03/20 2049     Color, UA [place]     Clarity, UA Clear     pH, UA 8 5     Leukocytes, UA Negative     Nitrite, UA Negative     Protein, UA Trace mg/dl      Glucose, UA Negative mg/dl      Ketones, UA Negative mg/dl      Urobilinogen, UA 0 2 E U /dl      Bilirubin, UA Negative     Blood, UA Trace     Specific [place], UA 1 015    Narrative:       CLINITEK RESULT    CBC and differential [[id number]]  (Abnormal) Collected:  02/03/20 2009    Lab Status:  Final result Specimen:  Blood from Arm, Right Updated:  02/03/20 2035     WBC 16 45 Thousand/uL      RBC 4 70 Million/uL      Hemoglobin 15 4 g/dL      Hematocrit 46 0 %      MCV 98 fL      MCH 32 8 pg      MCHC 33 5 g/dL      RDW 11 9 % MPV 9 3 fL      Platelets 611 Thousands/uL      nRBC 0 /100 WBCs      Neutrophils Relative 77 %      Immat GRANS % 0 %      Lymphocytes Relative 17 %      Monocytes Relative 6 %      Eosinophils Relative 0 %      Basophils Relative 0 %      Neutrophils Absolute 12 52 Thousands/µL      Immature Grans Absolute 0 07 Thousand/uL      Lymphocytes Absolute 2 81 Thousands/µL      Monocytes Absolute 0 98 Thousand/µL      Eosinophils Absolute 0 02 Thousand/µL      Basophils Absolute 0 05 Thousands/µL     POCT pregnancy, urine [[id number]]  (Normal) Resulted:  02/03/20 2027    Lab Status:  Final result Updated:  02/03/20 2027     EXT PREG TEST UR (Ref: Negative) negative     Control valid                 CT chest abdomen pelvis w contrast   Final Result by Nicolas Gonzalez MD (02/03 2213)         Left minimally displaced 6-8 rib fractures with tiny left lower lobe lingular pneumothorax and small left pleural effusion               I personally discussed this study with Deric Castillo on 2/3/2020 at 10:11 PM                      Workstation performed: [id number]                    Procedures  ECG 12 Lead Documentation Only  Date/Time: 2/3/2020 8:27 PM  Performed by: Landon Magallanes PA-C  Authorized by: Landon Magallanes PA-C     Indications / Diagnosis:  Chest pain  ECG reviewed by me, the ED Provider: yes    Patient location:  ED  Previous ECG:     Previous ECG:  Compared to current    Comparison ECG info:  8/24/2019    Similarity:  Changes noted  Interpretation:     Interpretation: non-specific    Rate:     ECG rate:  81    ECG rate assessment: normal    Rhythm:     Rhythm: sinus rhythm    Ectopy:     Ectopy: none    QRS:     QRS axis:  Normal  Conduction:     Conduction: normal    ST segments:     ST segments:  Normal  T waves:     T waves: normal    Comments:      QT/QTc 387/ 439             ED Course  ED Course as of Feb 04 0109   Mon Feb 03, 2020 2000 Will obtain CT C/A/P and labs due to degree of left sided chest and LUQ pain      2030 EKG 81 NSR  PVCs resolved from EKG on 8/24/19 2031 PREGNANCY TEST URINE: negative   2037 WBC(!): 16 45   2038 Hemoglobin: 15 4   2038 HCT: 46 0   2100 Likely due to dehydration   Sodium(!): 135   2101 Potassium(!): 3 0   2125 Blood Pressure: 102/70   2125 Pulse: 72   2125 Respirations: 16   2125 SpO2: 97 %   2129 Patient asked when away from significant other about whether she felt safe at home and patient responded "yes "      2209 Radiologist calling: left 6-8 rib minimally displaced rib fracture; tiny left lingular PTX; small left pleural effusion      2233 Discussed CT scan with patient; paging PACS for trauma transfer      2237 PACS returned call; will speak to trauma      2239 PACS to accept patient for transfer      2332 Pt leaving with transport                                  MDM  Number of Diagnoses or Management Options  Fall, initial encounter:   Multiple rib fractures:   Traumatic fracture of ribs with pneumothorax, left, closed, initial encounter:   Diagnosis management comments: 62-year-old female presents emergency department for evaluation of left-sided chest wall pain  Patient also endorses some shortness of breath  EKG was performed  Normal sinus rhythm at 81 beats per minute  Patient denies any head strike during the fall  GCS is 15, patient is alert and oriented, cranial nerves 2-12 intact  CT of the chest/abdomen/pelvis performed  CT demonstrated left-sided rib fractures 6  Through 8, the minimally displaced, as well as a left lingular pneumothorax and reactive left small pleural effusion  Patient denied any concern for safety at home  Due to the findings on the CT scan, and traumatic mechanism, trauma surgery at [facility] was consulted, who accepted patient for transfer  Patient is well-appearing, in no respiratory distress, low index of suspicion for intra-abdominal intrathoracic hemorrhage    Hemoglobin and hematocrit are within normal limits at this time   The management plan was discussed in detail with the patient and spouse at bedside and all questions were answered  All questions were answered and patient was comfortable with the plan of care and transfer  The patient verbalized understanding of our discussion and plan of care  Amount and/or Complexity of Data Reviewed  Discuss the patient with other providers: yes          Disposition  Final diagnoses:   Multiple rib fractures   Traumatic fracture of ribs with pneumothorax, left, closed, initial encounter   Fall, initial encounter     Time reflects when diagnosis was documented in both MDM as applicable and the Disposition within this note     Time User Action Codes Description Comment    2/3/2020 10:42 PM Mick Vinson Add [N91 [id number]] Multiple rib fractures     2/3/2020 10:42 PM Mick Mart Add [S22 42XA,  S27  0XXA] Traumatic fracture of ribs with pneumothorax, left, closed, initial encounter     2/3/2020 10:42 PM Mick Vinson Add [S30  Clotilda Bare, initial encounter       ED Disposition     ED Disposition Condition Date/Time Comment    Transfer to Another [facility] Feb 3, 2020 10:41 PM Brandi Good should be transferred out to [facility]          MD Documentation      Most Recent Value   Patient Condition  The patient has been stabilized such that within reasonable medical probability, no material deterioration of the patient condition or the condition of the unborn child(phani) is likely to result from the transfer   Reason for Transfer  Level of Care needed not available at this facility   Benefits of Transfer  Specialized equipment and/or services available at the receiving facility (Include comment)________________________   Risks of Transfer  Potential for delay in receiving treatment, Potential deterioration of medical condition, Loss of IV, Increased discomfort during transfer, Possible worsening of condition or death during transfer, Other: (Include comment)__________________________ [tension pneumothorax, hemothorax]   Accepting Physician  Dr Chloe Boast Name, Holly [facility] [place]   Melita Corado Pa-C   Provider Certification  General risk, such as traffic hazards, adverse weather conditions, rough terrain or turbulence, possible failure of equipment (including vehicle or aircraft), or consequences of actions of persons outside the control of the transport personnel, Unanticipated needs of medical equipment and personnel during transport, Risk of worsening condition, The possibility of a transport vehicle being unavailable      RN Documentation      [facility] [address] Name, [address] [facility] [address], [place] [address]    None         Discharge Medication List as of 2/3/2020 11:42 PM      CONTINUE these medications which have NOT CHANGED    Details   ALPRAZolam (XANAX) 1 mg tablet Take 1 mg by mouth 2 (two) times a day as needed, Starting u 8/8/2019, Historical Med      dicyclomine (BENTYL) 20 mg tablet Take 20 mg by mouth 4 (four) times a day, Starting Thu 7/11/2019, Historical Med      mirtazapine (REMERON SOL-TAB) 30 mg dispersible tablet Take 30 mg by mouth, Starting Thu 8/8/2019, Historical Med      ziprasidone (GEODON) 80 mg capsule Take 80 mg by mouth, Starting Th 8/8/2019, Historical Med      LORazepam (ATIVAN) 1 mg tablet Take 1 tablet (1 mg total) by mouth 2 (two) times a day for 2 days, Starting Sat 8/24/2019, Until Mon 8/26/2019, Print           No discharge procedures on file      ED Provider  Electronically Signed by           Alisha Gavin PA-C  02/04/20 1996

## 2020-02-04 NOTE — EMTALA/ACUTE CARE TRANSFER
[facility]  [address] [address]  Dept: [phone]      EMTALA TRANSFER CONSENT    NAME Tierra Maria                                         1975                              MRN [id number]    I have been informed of my rights regarding examination, treatment, and transfer   by Dr Ebonie Cartwright DO    Benefits: Specialized equipment and/or services available at the receiving facility (Include comment)________________________    Risks: Potential for delay in receiving treatment, Potential deterioration of medical condition, Loss of IV, Increased discomfort during transfer, Possible worsening of condition or death during transfer, Other: (Include comment)__________________________(tension pneumothorax, hemothorax)      Consent for Transfer:  I acknowledge that my medical condition has been evaluated and explained to me by the emergency department physician or other qualified medical person and/or my attending physician, who has recommended that I be transferred to the service of  Accepting Physician: Dr Marbella Rios at [address] [place] [address] Name, [address] : [address], [place]  The above potential benefits of such transfer, the potential risks associated with such transfer, and the probable risks of not being transferred have been explained to me, and I fully understand them  The doctor has explained that, in my case, the benefits of transfer outweigh the risks  I agree to be transferred  I authorize the performance of emergency medical procedures and treatments upon me in both transit and upon arrival at the receiving facility  Additionally, I authorize the release of any and all medical records to the receiving facility and request they be transported with me, if possible    I understand that the safest mode of transportation during a medical emergency is an ambulance and that the Hospital advocates the use of this mode of transport  Risks of traveling to the receiving facility by car, including absence of medical control, life sustaining equipment, such as oxygen, and medical personnel has been explained to me and I fully understand them  (MIREYA CORRECT BOX BELOW)  [ x ]  I consent to the stated transfer and to be transported by ambulance/helicopter  [  ]  I consent to the stated transfer, but refuse transportation by ambulance and accept full responsibility for my transportation by car  I understand the risks of non-ambulance transfers and I exonerate the Hospital and its staff from any deterioration in my condition that results from this refusal     X___________________________________________    DATE  20  TIME________  Signature of patient or legally responsible individual signing on patient behalf           RELATIONSHIP TO PATIENT_________________________          Provider Certification    NAME Maria Esther Dsouza                                        [facility] 1975                              MRN [id number]    A medical screening exam was performed on the above named patient  Based on the examination:    Condition Necessitating Transfer The primary encounter diagnosis was Multiple rib fractures  Diagnoses of Traumatic fracture of ribs with pneumothorax, left, closed, initial encounter and Fall, initial encounter were also pertinent to this visit      Patient Condition: The patient has been stabilized such that within reasonable medical probability, no material deterioration of the patient condition or the condition of the unborn child(phani) is likely to result from the transfer    Reason for Transfer: Level of Care needed not available at this facility    Transfer Requirements: Facility [address] [place] [address]   · Space available and qualified personnel available for treatment as acknowledged by  PACS  · Agreed to accept transfer and to provide appropriate medical treatment as acknowledged by         Reed  · Appropriate medical records of the examination and treatment of the patient are provided at the time of transfer   [address] [facility], [address] _______  · Transfer will be performed by qualified personnel from    and appropriate transfer equipment as required, including the use of necessary and appropriate life support measures  Provider Certification: I have examined the patient and explained the following risks and benefits of being transferred/refusing transfer to the patient/family:  General risk, such as traffic hazards, adverse weather conditions, rough terrain or turbulence, possible failure of equipment (including vehicle or aircraft), or consequences of actions of persons outside the control of the transport personnel, Unanticipated needs of medical equipment and personnel during transport, Risk of worsening condition, The possibility of a transport vehicle being unavailable      Based on these reasonable risks and benefits to the patient and/or the unborn child(phani), and based upon the information available at the time of the patients examination, I certify that the medical benefits reasonably to be expected from the provision of appropriate medical treatments at another medical facility outweigh the increasing risks, if any, to the individuals medical condition, and in the case of labor to the unborn child, from effecting the transfer      X____________________________________________ DATE 02/03/20        TIME_______      ORIGINAL - SEND TO MEDICAL RECORDS   COPY - SEND WITH PATIENT DURING TRANSFER

## 2020-02-04 NOTE — ASSESSMENT & PLAN NOTE
- Monitor vitals and Spo2  - Repeat CXR this AM  - Supplemental O2 as needed   O2 sats currently 95% on room air

## 2020-02-04 NOTE — H&P
H&P Exam - Trauma   Tosha Call 45 y o  female MRN: [id number]  Unit/Bed#: [facility] Encounter: [id number]    Assessment/Plan   Trauma Alert: Other Transfer from [address]: Ambulance  Trauma Team: Attending Rafael Peña, Residents Prem Kelly and Fellow [facility]  Consultants: None    Trauma Active Problems: Left 6-8 rib fractures and tiny lingular PTX    Trauma Plan: admit for pain control and am CXR    Chief Complaint: L rib pain    History of Present Illness   HPI:  Tosha Call is a 45 y o  female who presents as a transfer from [address]  She states that three days ago she was cleaning in her kitchen when she suddenly slipped on the wet floor and landed on her left side with her left arm underneath her body  The patient noted immediate pain in the left ribcage but went about her day, tolerating the pain  Patient states that she was taking Tylenol p m  For pain control as well as sleep medications at night  She states she could not take the pain anymore and decided to come to the emergency department today  CT scan of her chest revealed three rib fractures in ribs six through eight along with a tiny, lingular pneumothorax  Patient does endorse some mild shortness of breath  She smokes a pack cigarettes per day and drinks a few alcoholic drinks per week  Patient only takes medication for her hypertension and her psychiatric history  Mechanism:Fall    Review of Systems   Constitutional: Negative for chills, fatigue and fever  HENT: Negative for congestion, rhinorrhea, sinus pressure and sore throat  Eyes: Negative for visual disturbance  Respiratory: Positive for shortness of breath  Negative for cough  Cardiovascular: Positive for chest pain  Gastrointestinal: Negative for abdominal pain, constipation, diarrhea, nausea and vomiting  Genitourinary: Negative for dysuria, frequency, hematuria and urgency  Musculoskeletal: Negative for arthralgias and myalgias     Skin: Negative for color change and rash  Neurological: Negative for dizziness, light-headedness and numbness  12-point, complete review of systems was reviewed and negative except as stated above  Historical Information   History is unobtainable from the patient due to nothing  Efforts to obtain history included the following sources: family member    Past Medical History:   Diagnosis Date    Cardiomyopathy [facility])     Chest pain     Diarrhea     Elevated transaminase measurement     History of vitamin D deficiency     Irritable bowel syndrome (IBS)     with Constipation    Malaise and fatigue     Nutritional deficiency     Overactive thyroid gland     Pain in joint involving multiple sites     Palpitations     Sweating abnormality     Vaginal infection     Vitamin D insufficiency      Past Surgical History:   Procedure Laterality Date    FINGER SURGERY      TUBAL LIGATION      UPPER GASTROINTESTINAL ENDOSCOPY       Social History   Social History     Substance and Sexual Activity   Alcohol Use Yes    Comment: occasionally     Social History     Substance and Sexual Activity   Drug Use Yes    Types: Marijuana    Comment: occasionally     Social History     Tobacco Use   Smoking Status Current Every Day Smoker    Packs/day: 0 50   Smokeless Tobacco Never Used       There is no immunization history on file for this patient  Last Tetanus:  Not applicable  Family History: Non-contributory  Unable to obtain/limited by nothing      Meds/Allergies   current meds:   No current facility-administered medications for this encounter          Allergies   Allergen Reactions    Gabapentin Anaphylaxis    Adhesive [Medical Tape] Rash         PHYSICAL EXAM    PE limited by:  Nothing    Objective   Vitals:   First set: Temperature: 98 2 °F (36 8 °C) (02/04/20 0015)  Pulse: 63 (02/04/20 0015)  Respirations: 18 (02/04/20 0015)  Blood Pressure: 108/61 (02/04/20 0015)    Primary Survey:   (A) Airway:  Intact  (B) Breathing:  Bilateral breath sounds present  (C) Circulation: Pulses:   pedal  2/4 and radial  2/4  (D) Disabliity:  GCS Total:  15  (E) Expose:  Completed    Secondary Survey: (Click on Physical Exam tab above)  Physical Exam   Constitutional: She is oriented to person, place, and time  She appears well-developed and well-nourished  No distress  HENT:   Head: Normocephalic and atraumatic  Eyes: Pupils are equal, round, and reactive to light  Conjunctivae and EOM are normal  Right eye exhibits no discharge  Left eye exhibits no discharge  No scleral icterus  Neck: Normal range of motion  Neck supple  No JVD present  Cardiovascular: Normal rate, regular rhythm and normal heart sounds  Exam reveals no gallop and no friction rub  No murmur heard  Pulmonary/Chest: Effort normal and breath sounds normal  No stridor  No respiratory distress  She has no wheezes  She has no rales  She exhibits tenderness (Left-sided)  No crepitus   Abdominal: Soft  Bowel sounds are normal  She exhibits no distension  There is no tenderness  There is no guarding  Musculoskeletal: Normal range of motion  She exhibits no edema, tenderness or deformity  Neurological: She is alert and oriented to person, place, and time  No cranial nerve deficit or sensory deficit  She exhibits normal muscle tone  Skin: Skin is warm and dry  No rash noted  She is not diaphoretic  No erythema  No pallor  No break in skin   Psychiatric: She has a normal mood and affect  Her behavior is normal    Nursing note and vitals reviewed        Invasive Devices     Peripheral Intravenous Line            Peripheral IV 02/03/20 Right Antecubital less than 1 day                Lab Results:   BMP/CMP:   Lab Results   Component Value Date    SODIUM 135 (L) 02/03/2020    K 3 0 (L) 02/03/2020    CL 94 (L) 02/03/2020    CO2 33 (H) 02/03/2020    BUN 11 02/03/2020    CREATININE 0 89 02/03/2020    CALCIUM 9 6 02/03/2020    AST 17 02/03/2020    ALT 26 02/03/2020 ALKPHOS 118 (H) 02/03/2020    EGFR 80 02/03/2020   , CBC:   Lab Results   Component Value Date    WBC 16 45 (H) 02/03/2020    HGB 15 4 02/03/2020    HCT 46 0 02/03/2020    MCV 98 02/03/2020     02/03/2020    MCH 32 8 02/03/2020    MCHC 33 5 02/03/2020    RDW 11 9 02/03/2020    MPV 9 3 02/03/2020    NRBC 0 02/03/2020   , Urinalysis:   Lab Results   Component Value Date    COLORU [place] 02/03/2020    CLARITYU Clear 02/03/2020    SPECGRAV 1 015 02/03/2020    PHUR 8 5 (H) 02/03/2020    LEUKOCYTESUR Negative 02/03/2020    NITRITE Negative 02/03/2020    GLUCOSEU Negative 02/03/2020    KETONESU Negative 02/03/2020    BILIRUBINUR Negative 02/03/2020    BLOODU Trace (A) 02/03/2020    and Pregnancy: No results found for: PREGTESTUR  Imaging/EKG Studies: CT Chest: Left minimally displaced 6-8 rib fractures with tiny left lower lobe lingular pneumothorax and small left pleural effusion  , CT Scan Abdomen/Pelvis: See above  Other Studies:  EKG normal    Code Status: No Order  Advance Directive and Living Will:      Power of :    POLST:

## 2020-02-04 NOTE — PROGRESS NOTES
Progress Note - Tosha Cuff 1975, 45 y o  female MRN: [id number]    Unit/Bed#: ED 14 Encounter: [id number]    Primary Care Provider: Berhane Galloway   Date and time admitted to hospital: 2/4/2020 12:14 AM      Hypokalemia  Assessment & Plan  - K 3 0 this AM, repleted    Pneumothorax, left  Assessment & Plan  - Monitor vitals and Spo2  - Repeat CXR this AM  - Supplemental O2 as needed  O2 sats currently 95% on room air    Fall  Assessment & Plan  - Mechanical fall  Patient ambulatory in ED with no significant gait disturbance  - Monitor vitals, no orthostatic hypotension  - PT/OT eval    * Closed fracture of multiple ribs of left side  Assessment & Plan  - Rib fracture protocol  - Analgesia  - Aggressive pulm toilet and IS          Bedside nurse rounds completed with nurse Yes  Prophylaxis: Enoxaparin (Lovenox)    Disposition: Likely discharge if CXR this AM stable    Code status:  Prior    Consultants: None    Is the patient 72 years or older?: No        SUBJECTIVE:     Transfer from: [address] [place] [address]  Outside Films Received: not applicable  Tertiary Exam Due on: 2/4/2020    Mechanism of Injury: Fall at home    Details related to Injury: Mechanical fall from standing with subsequent left rib fractures 6-8 and small left pneumothorax    Chief Complaint: "I feel fine  I would like to go home "    HPI/Last 24 hour events: No significant 24 hour events per chart review or nursing staff  Patient is an otherwise healthy and well-appearing 53-year-old  female presenting to [place] [address] as a trauma transfer from [facility] for evaluation and management of CT evidence reporting minimally displaced left rib fractures 6 through 8 and small lingular pneumothorax s/p fall  The patient presented to [facility] ED with a complaint of left-sided chest wall pain x3 days    She reports having a fall at home 3 days ago, stating that while she was cleaning her kitchen she slipped on a wet surface, landing on her left side and bracing the fall with her left arm tucked under her left chest wall  She denied any headache or loss of consciousness, and suffered no other injuries from the fall  She denies headache at present, as well as any chest pain, shortness of breath, and pain with deep breathing  She denies neck pain, back pain, extremity numbness or weakness  She reports that her pain has been well controlled with current regimen and is requesting discharge to home  Active medications:         No current facility-administered medications for this encounter  Current Outpatient Medications:     dicyclomine (BENTYL) 20 mg tablet    LORazepam (ATIVAN) 1 mg tablet    mirtazapine (REMERON SOL-TAB) 30 mg dispersible tablet    ziprasidone (GEODON) 80 mg capsule    methocarbamol (ROBAXIN) 500 mg tablet    oxyCODONE (ROXICODONE) 5 mg immediate release tablet      OBJECTIVE:     Vitals:   Vitals:    02/04/20 1400   BP: 117/65   Pulse: 65   Resp: 18   Temp: 98 1 °F (36 7 °C)   SpO2:        Physical Exam:   General appearance: Pleasant and well-appearing, seated upright on examination table in no acute distress  Neuro: GCS 15  A&Ox3  CN 2-12 intact  Strength 5/5 in all extremities with no sensory deficit  No abnormal tone  HENT: Normocephalic/atraumatic, no scalp or facial bone tenderness  No hemotympanum  Mucous membranes moist   Eyes: PERRL, painless EOMs, no nystagmus  CV: Regular rate and rhythm, no murmurs rubs or gallops  2+ radial and DP pulses bilaterally  Pulm: CTAB, minimal expiratory wheeze posterior lung bases  O2 sat 95% on room air with no evidence of respiratory distress  Minimal lateral left-sided chest wall tenderness to palpation, no bony deformity or crepitus  Abd: Normoactive bowel sounds x 4  Soft, non-tender, non-distended  No CVA tenderness  : Deferred  MSK: No midline cervical, thoracic, or lumbar spinal or paraspinal tenderness to palpation   No bony deformity or step offs  No bony tenderness along the upper extremity and lower extremity joints  Skin: Unremarkable  No evidence of contusion, ecchymosis, abrasions or lacerations  I/O:   I/O     None          Invasive Devices: Invasive Devices     Peripheral Intravenous Line            Peripheral IV 20 Right Antecubital less than 1 day                  Imagin2020 XR chest pa & lateral:  CHEST      INDICATION:   follow up      COMPARISON:  2016; CT abdomen to 320     EXAM PERFORMED/VIEWS:  XR CHEST PA & LATERAL        FINDINGS:     Cardiomediastinal silhouette appears unremarkable      Some atelectatic change identified in the lateral left lung base  Tiny pleural effusion suspected  No definite pneumothorax      Minimally displaced fractures of the lateral left 6th, 7th, and 8th ribs noted      IMPRESSION:        1  Left-sided rib fractures with underlying left basilar atelectasis and tiny effusion  No pneumothorax appreciated  2   Otherwise unremarkable study            Workstation performed: [id number]    Labs:   CBC:   Lab Results   Component Value Date    WBC 11 06 (H) 2020    HGB 14 0 2020    HCT 41 3 2020    MCV 98 2020     2020    MCH 33 1 2020    MCHC 33 9 2020    RDW 12 1 2020    MPV 9 3 2020    NRBC 0 2020     CMP:   Lab Results   Component Value Date     2020    CO2 29 2020    BUN 16 2020    CREATININE 0 75 2020    CALCIUM 8 1 (L) 2020    AST 17 2020    ALT 26 2020    ALKPHOS 118 (H) 2020    EGFR 99 2020     Radiology review: I have personally reviewed images in PACS

## 2020-02-09 ENCOUNTER — NURSE TRIAGE (OUTPATIENT)
Dept: OTHER | Facility: OTHER | Age: 45
End: 2020-02-09

## 2020-02-09 ENCOUNTER — APPOINTMENT (EMERGENCY)
Dept: RADIOLOGY | Facility: HOSPITAL | Age: 45
End: 2020-02-09
Payer: COMMERCIAL

## 2020-02-09 ENCOUNTER — HOSPITAL ENCOUNTER (EMERGENCY)
Facility: HOSPITAL | Age: 45
Discharge: HOME | End: 2020-02-09
Attending: EMERGENCY MEDICINE
Payer: COMMERCIAL

## 2020-02-09 VITALS
RESPIRATION RATE: 16 BRPM | BODY MASS INDEX: 25.52 KG/M2 | DIASTOLIC BLOOD PRESSURE: 68 MMHG | WEIGHT: 172.84 LBS | TEMPERATURE: 98.2 F | SYSTOLIC BLOOD PRESSURE: 126 MMHG | OXYGEN SATURATION: 100 % | HEART RATE: 65 BPM

## 2020-02-09 DIAGNOSIS — R07.81 RIB PAIN ON LEFT SIDE: Primary | ICD-10-CM

## 2020-02-09 PROCEDURE — 71046 X-RAY EXAM CHEST 2 VIEWS: CPT

## 2020-02-09 PROCEDURE — 99282 EMERGENCY DEPT VISIT SF MDM: CPT | Performed by: PHYSICIAN ASSISTANT

## 2020-02-09 PROCEDURE — 99283 EMERGENCY DEPT VISIT LOW MDM: CPT

## 2020-02-09 RX ORDER — OXYCODONE HYDROCHLORIDE AND ACETAMINOPHEN 5; 325 MG/1; MG/1
1 TABLET ORAL EVERY 8 HOURS PRN
Qty: 9 TABLET | Refills: 0 | Status: SHIPPED | OUTPATIENT
Start: 2020-02-09 | End: 2020-02-12

## 2020-02-09 NOTE — TELEPHONE ENCOUNTER
Reason for Disposition   SEVERE chest pain    Answer Assessment - Initial Assessment Questions  1  MECHANISM: "How did the injury happen?"      Fell on arm and chest and fractured ribs  2  ONSET: "When did the injury happen?" (Minutes or hours ago)      Fall occurred on 2/3/2020  3  LOCATION: "Where on the chest is the injury located?"      Left chest, ribs and upper back  4  APPEARANCE: "What does the injury look like?"      Denies bruising  5  BLEEDING: "Is there any bleeding now? If so, ask: How long has it been bleeding?"      Denied  6  SEVERITY: "Any difficulty with breathing?"      Hurts to breath  Denies respiratory distress  7  SIZE: For cuts, bruises, or swelling, ask: "How large is it?" (e g , inches or centimeters)      N/A  8  PAIN: "Is there pain?" If so, ask: "How bad is the pain?"   (e g , Scale 1-10; or mild, moderate, severe)      Chest pain rated 10/10  Movement of any kind increases pain  10  PREGNANCY: "Is there any chance you are pregnant?" "When was your last menstrual period?"        LMP was 1/1/2020  11  Medications:         * Robaxin, 500 mg  @ 1330  * Extra Stength Tylenol, 500 mg/ tablet, 2 tablets @ 8722 on 2/8/20        * Lidocaine cream applied @ 1386 on 2/8/20      Protocols used: CHEST INJURY-ADULT-

## 2020-02-10 NOTE — ED PROVIDER NOTES
History  Chief Complaint   Patient presents with    Rib Pain     Reports she was here a few days ago and dx with broken ribs on left side and still having pain, unable to sleep due to pain  Ran out of pain medications  Has appt with trauma for follow up on 2/18   Medication Refill     Patient is a 53-year-old female presents for evaluation of left-sided rib pain  She was seen here six days ago and found to have three rib fractures and a small lingular pneumothorax  She was transferred to but on under Trauma Service and was observed  The pneumothorax resolved and she was discharged shortly afterwards  She reports that she has since run out of her oxycodone and still has pain  She has not followed up with the Trauma Service yet  Denies any respiratory distress, shortness of breath, cough, fever, chills, abdominal pain  Prior to Admission Medications   Prescriptions Last Dose Informant Patient Reported? Taking?    LORazepam (ATIVAN) 1 mg tablet   No No   Sig: Take 1 tablet (1 mg total) by mouth 2 (two) times a day for 2 days   dicyclomine (BENTYL) 20 mg tablet   Yes No   Sig: Take 20 mg by mouth 4 (four) times a day   methocarbamol (ROBAXIN) 500 mg tablet   No No   Sig: Take 1 tablet (500 mg total) by mouth 3 (three) times a day as needed for muscle spasms   mirtazapine (REMERON SOL-TAB) 30 mg dispersible tablet   Yes No   Sig: Take 30 mg by mouth   oxyCODONE (ROXICODONE) 5 mg immediate release tablet   No No   Sig: Take 1 tablet (5 mg total) by mouth every 6 (six) hours as needed for severe painMax Daily Amount: 20 mg   ziprasidone (GEODON) 80 mg capsule   Yes No   Sig: Take 80 mg by mouth      Facility-Administered Medications: None       Past Medical History:   Diagnosis Date    Cardiomyopathy (HCC)     Chest pain     Diarrhea     Elevated transaminase measurement     History of vitamin D deficiency     Irritable bowel syndrome (IBS)     with Constipation    Malaise and fatigue     Nutritional deficiency     Overactive thyroid gland     Pain in joint involving multiple sites     Palpitations     Sweating abnormality     Vaginal infection     Vitamin D insufficiency        Past Surgical History:   Procedure Laterality Date    FINGER SURGERY      TUBAL LIGATION      UPPER GASTROINTESTINAL ENDOSCOPY         History reviewed  No pertinent family history  I have reviewed and agree with the history as documented  Social History     Tobacco Use    Smoking status: Current Every Day Smoker     Packs/day: 0 50    Smokeless tobacco: Never Used   Substance Use Topics    Alcohol use: Yes     Comment: occasionally    Drug use: Yes     Types: Marijuana     Comment: occasionally        Review of Systems   Constitutional: Negative for activity change, appetite change and fatigue  HENT: Negative for nosebleeds, sneezing, sore throat, trouble swallowing and voice change  Eyes: Negative for photophobia, pain and visual disturbance  Respiratory: Negative for apnea, choking and stridor  Cardiovascular: Negative for palpitations and leg swelling  Gastrointestinal: Negative for anal bleeding and constipation  Endocrine: Negative for cold intolerance, heat intolerance, polydipsia and polyphagia  Genitourinary: Negative for decreased urine volume, enuresis, frequency, genital sores and urgency  Musculoskeletal: Negative for joint swelling and myalgias  Allergic/Immunologic: Negative for environmental allergies and food allergies  Neurological: Negative for tremors, seizures, speech difficulty and weakness  Hematological: Negative for adenopathy  Psychiatric/Behavioral: Negative for behavioral problems, decreased concentration, dysphoric mood and hallucinations  All other systems reviewed and are negative  Physical Exam  Physical Exam   Constitutional: She is oriented to person, place, and time  She appears well-developed and well-nourished  No distress     HENT:   Head: Normocephalic and atraumatic  Right Ear: External ear normal    Left Ear: External ear normal    Nose: Nose normal    Mouth/Throat: Oropharynx is clear and moist    Eyes: Pupils are equal, round, and reactive to light  Conjunctivae and EOM are normal    Neck: Normal range of motion  Neck supple  Cardiovascular: Normal rate, regular rhythm and normal heart sounds  Exam reveals no gallop and no friction rub  No murmur heard  Pulmonary/Chest: Effort normal and breath sounds normal  No respiratory distress  She has no wheezes  Abdominal: Soft  Bowel sounds are normal    Musculoskeletal: She exhibits tenderness  Neurological: She is alert and oriented to person, place, and time  Skin: Skin is warm and dry  She is not diaphoretic  Psychiatric: She has a normal mood and affect  Her behavior is normal    Vitals reviewed  Vital Signs  ED Triage Vitals [02/09/20 1823]   Temperature Pulse Respirations Blood Pressure SpO2   98 2 °F (36 8 °C) 65 16 126/68 100 %      Temp Source Heart Rate Source Patient Position - Orthostatic VS BP Location FiO2 (%)   Temporal -- -- -- --      Pain Score       Worst Possible Pain           Vitals:    02/09/20 1823   BP: 126/68   Pulse: 65         Visual Acuity      ED Medications  Medications - No data to display    Diagnostic Studies  Results Reviewed     None                 XR chest 2 views    (Results Pending)              Procedures  Procedures         ED Course                               MDM      Disposition  Final diagnoses:   Rib pain on left side     Time reflects when diagnosis was documented in both MDM as applicable and the Disposition within this note     Time User Action Codes Description Comment    2/9/2020  7:28 PM Oscar Paul Add [R07 [id number]] Rib pain on left side       ED Disposition     ED Disposition Condition Date/Time Comment    Discharge Stable Sun Feb 9, 2020  7:28 PM Leah Leaven discharge to home/self care              Follow-up Information     Follow up With Specialties Details Why Contact Info Additional Information    [facility] Trauma Surgery   [address]  [address] [facility] [address]  [address] [facility], [address], [place], [place], [address] [facility]    [facility]   [address]  [phone]             Patient's Medications   Discharge Prescriptions    OXYCODONE-ACETAMINOPHEN (PERCOCET) 5-325 MG PER TABLET    Take 1 tablet by mouth every 8 (eight) hours as needed for moderate pain for up to 3 daysMax Daily Amount: 3 tablets       Start Date: 2/9/2020  End Date: 2/12/2020       Order Dose: 1 tablet       Quantity: 9 tablet    Refills: 0     No discharge procedures on file      ED Provider  Electronically Signed by           Marcos Mccallum PA-C  02/09/20 6520

## 2020-02-18 ENCOUNTER — OFFICE VISIT (OUTPATIENT)
Dept: SURGERY | Facility: CLINIC | Age: 45
End: 2020-02-18
Payer: COMMERCIAL

## 2020-02-18 VITALS
HEIGHT: 69 IN | SYSTOLIC BLOOD PRESSURE: 102 MMHG | TEMPERATURE: 97.1 F | DIASTOLIC BLOOD PRESSURE: 68 MMHG | BODY MASS INDEX: 25.83 KG/M2 | WEIGHT: 174.4 LBS | HEART RATE: 60 BPM

## 2020-02-18 DIAGNOSIS — S22.42XA CLOSED FRACTURE OF MULTIPLE RIBS OF LEFT SIDE: ICD-10-CM

## 2020-02-18 DIAGNOSIS — S22.49XA RIB FRACTURES: Primary | ICD-10-CM

## 2020-02-18 PROBLEM — J94.2 HEMOTHORAX: Status: ACTIVE | Noted: 2020-02-18

## 2020-02-18 PROBLEM — J93.9 PNEUMOTHORAX, LEFT: Status: RESOLVED | Noted: 2020-02-04 | Resolved: 2020-02-18

## 2020-02-18 PROCEDURE — 99213 OFFICE O/P EST LOW 20 MIN: CPT | Performed by: SURGERY

## 2020-02-18 RX ORDER — METHOCARBAMOL 750 MG/1
750 TABLET, FILM COATED ORAL EVERY 6 HOURS PRN
Qty: 60 TABLET | Refills: 0 | Status: SHIPPED | OUTPATIENT
Start: 2020-02-18 | End: 2021-11-22

## 2020-02-18 RX ORDER — IBUPROFEN 600 MG/1
600 TABLET ORAL EVERY 6 HOURS PRN
Qty: 40 TABLET | Refills: 0 | Status: SHIPPED | OUTPATIENT
Start: 2020-02-18

## 2020-02-18 NOTE — PROGRESS NOTES
Office Visit - General Surgery  Enzo Valdez MRN: [id number]  Encounter: [id number]    Assessment and Plan    Problem List Items Addressed This Visit        Musculoskeletal and Integument    Closed fracture of multiple ribs of left side     - continue tylenol 650 mg PO every 6 hours  - add methocarbamol 750 mg PO q6h and ibuprofen 600 mg PO q6h  - recommend heating pad to back/chest wall as needed  - no heavy lifting/pushing/pulling > 10 pounds  - no work until cleared by trauma  - f/u with trauma in 2 weeks with repeat CXR              Other Visit Diagnoses     Rib fractures    -  Primary    Relevant Medications    methocarbamol (ROBAXIN) 750 mg tablet    ibuprofen (MOTRIN) 600 mg tablet    Other Relevant Orders    XR chest pa & lateral          Chief Complaint:  Enzo Valdez is a 45 y o  female who presents for Fall (f/u fall)    Subjective  46 y/o male s/p fall sustaining L 6-8th rib fractures and a L PTX on 2/4  She re-presented to the ED on 2/9 due to pain when a repeat CXR was obtained showing continued resolution of the PTX but with the development of a very small hemothorax  She notes continued pain in the left chest wall but states that it is improving  She has been taking tylenol 2-3 times per day  She is not taking any additional medications at this time as she has ran out  She has not been working and has been doing minimal work around [employer]       Past Medical History  Past Medical History:   Diagnosis Date    Cardiomyopathy [facility])     Chest pain     Diarrhea     Elevated transaminase measurement     History of vitamin D deficiency     Irritable bowel syndrome (IBS)     with Constipation    Malaise and fatigue     Nutritional deficiency     Overactive thyroid gland     Pain in joint involving multiple sites     Palpitations     Sweating abnormality     Vaginal infection     Vitamin D insufficiency        Past Surgical History  Past Surgical History:   Procedure Laterality Date    ANKLE SURGERY Left     FINGER SURGERY      TUBAL LIGATION      UPPER GASTROINTESTINAL ENDOSCOPY         Family History  Family History   Problem Relation Age of Onset    No Known Problems Mother     Diabetes Father     Cancer Father        Social History  Social History     Socioeconomic History    Marital status: Single     Spouse name: None    Number of children: None    Years of education: None    Highest education level: None   Occupational History    None   Social Needs    Financial resource strain: None    Food insecurity:     Worry: None     Inability: None    Transportation needs:     Medical: None     Non-medical: None   Tobacco Use    Smoking status: Current Every Day Smoker     Packs/day: 0 50    Smokeless tobacco: Never Used   Substance and Sexual Activity    Alcohol use: Yes     Comment: occasionally    Drug use: Yes     Types: Marijuana     Comment: occasionally    Sexual activity: None   Lifestyle    Physical activity:     Days per week: None     Minutes per session: None    Stress: None   Relationships    Social connections:     Talks on phone: None     Gets together: None     Attends [employer] service: None     Active member of club or organization: None     Attends meetings of clubs or organizations: None     Relationship status: None    Intimate partner violence:     Fear of current or ex partner: None     Emotionally abused: None     Physically abused: None     Forced sexual activity: None   Other Topics Concern    None   Social History Narrative    None        Medications  Current Outpatient Medications on File Prior to Visit   Medication Sig Dispense Refill    dicyclomine (BENTYL) 20 mg tablet Take 20 mg by mouth 4 (four) times a day      methocarbamol (ROBAXIN) 500 mg tablet Take 1 tablet (500 mg total) by mouth 3 (three) times a day as needed for muscle spasms 30 tablet 0    mirtazapine (REMERON SOL-TAB) 30 mg dispersible tablet Take 30 mg by mouth      ziprasidone (GEODON) 80 mg capsule Take 80 mg by mouth      LORazepam (ATIVAN) 1 mg tablet Take 1 tablet (1 mg total) by mouth 2 (two) times a day for 2 days 4 tablet 0    oxyCODONE (ROXICODONE) 5 mg immediate release tablet Take 1 tablet (5 mg total) by mouth every 6 (six) hours as needed for severe painMax Daily Amount: 20 mg (Patient not taking: Reported on 2/18/2020) 10 tablet 0     No current facility-administered medications on file prior to visit  Allergies  Allergies   Allergen Reactions    Gabapentin Anaphylaxis    Adhesive [Medical Tape] Rash       Review of Systems   Constitutional: Negative  HENT: Negative  Eyes: Negative  Respiratory: Negative for cough, chest tightness and shortness of breath  Cardiovascular: Negative  Gastrointestinal: Negative  Endocrine: Negative  Genitourinary: Negative  Musculoskeletal:        + L chest wall discomfort over area of rib fractures   Skin: Negative  Allergic/Immunologic: Negative  Neurological: Negative  Hematological: Negative  Psychiatric/Behavioral: Negative  Objective  Vitals:    02/18/20 1249   BP: 102/68   Pulse: 60   Temp: (!) 97 1 °F (36 2 °C)       Physical Exam   Constitutional: She is oriented to person, place, and time  She appears well-developed and well-nourished  No distress  HENT:   Head: Normocephalic  Eyes: Pupils are equal, round, and reactive to light  Neck: Normal range of motion  Neck supple  Cardiovascular: Normal rate, regular rhythm and normal heart sounds  Pulmonary/Chest: Effort normal and breath sounds normal  No respiratory distress  Oxygen saturations 100% on room air   Abdominal: Soft  Bowel sounds are normal  She exhibits no distension  There is no tenderness  There is no rebound and no guarding  Musculoskeletal: Normal range of motion  She exhibits no edema, tenderness or deformity  Neurological: She is alert and oriented to person, place, and time     Skin: Skin is warm and dry  Capillary refill takes less than 2 seconds  No erythema  Psychiatric: She has a normal mood and affect   Her behavior is normal

## 2020-03-03 ENCOUNTER — HOSPITAL ENCOUNTER (OUTPATIENT)
Dept: RADIOLOGY | Facility: HOSPITAL | Age: 45
Discharge: HOME | End: 2020-03-03
Payer: COMMERCIAL

## 2020-03-03 DIAGNOSIS — S22.49XA RIB FRACTURES: ICD-10-CM

## 2020-03-03 PROCEDURE — 71046 X-RAY EXAM CHEST 2 VIEWS: CPT

## 2020-03-10 ENCOUNTER — TRANSCRIBE ORDERS (OUTPATIENT)
Dept: RADIOLOGY | Facility: HOSPITAL | Age: 45
End: 2020-03-10

## 2020-03-10 ENCOUNTER — OFFICE VISIT (OUTPATIENT)
Dept: SURGERY | Facility: CLINIC | Age: 45
End: 2020-03-10
Payer: COMMERCIAL

## 2020-03-10 VITALS — HEIGHT: 69 IN | WEIGHT: 169.6 LBS | TEMPERATURE: 97.4 F | HEART RATE: 77 BPM | BODY MASS INDEX: 25.12 KG/M2

## 2020-03-10 DIAGNOSIS — S22.42XD CLOSED FRACTURE OF MULTIPLE RIBS OF LEFT SIDE WITH ROUTINE HEALING, SUBSEQUENT ENCOUNTER: Primary | ICD-10-CM

## 2020-03-10 PROCEDURE — 99212 OFFICE O/P EST SF 10 MIN: CPT | Performed by: SURGERY

## 2020-03-10 NOTE — PROGRESS NOTES
Office Visit - General Surgery  Mireya Gandhi MRN: [id number]  Encounter: [id number]    Assessment and Plan    Problem List Items Addressed This Visit        Musculoskeletal and Integument    Closed fracture of multiple ribs of left side - Primary     Breath sounds equal bilaterally  No shortness of breath  Pain controlled with Ibuprofen  No further follow up required               Chief Complaint:  Mireya Gandhi is a 44 y.o. female who presents for Fall (f/u fall)    Subjective  I am feeling well    Past Medical History  Past Medical History:   Diagnosis Date   • Cardiomyopathy (HCC)    • Chest pain    • Diarrhea    • Elevated transaminase measurement    • History of vitamin D deficiency    • Irritable bowel syndrome (IBS)     with Constipation   • Malaise and fatigue    • Nutritional deficiency    • Overactive thyroid gland    • Pain in joint involving multiple sites    • Palpitations    • Sweating abnormality    • Vaginal infection    • Vitamin D insufficiency        Past Surgical History  Past Surgical History:   Procedure Laterality Date   • ANKLE SURGERY Left    • FINGER SURGERY     • TUBAL LIGATION     • UPPER GASTROINTESTINAL ENDOSCOPY         Family History  Family History   Problem Relation Age of Onset   • No Known Problems Mother    • Diabetes Father    • Cancer Father        Social History  Social History     Socioeconomic History   • Marital status: Single     Spouse name: None   • Number of children: None   • Years of education: None   • Highest education level: None   Occupational History   • None   Social Needs   • Financial resource strain: None   • Food insecurity:     Worry: None     Inability: None   • Transportation needs:     Medical: None     Non-medical: None   Tobacco Use   • Smoking status: Current Every Day Smoker     Packs/day: 0.50   • Smokeless tobacco: Never Used   Substance and Sexual Activity   • Alcohol use: Yes     Comment: occasionally   • Drug use: Yes     Types:  Marijuana     Comment: occasionally   • Sexual activity: None   Lifestyle   • Physical activity:     Days per week: None     Minutes per session: None   • Stress: None   Relationships   • Social connections:     Talks on phone: None     Gets together: None     Attends [employer] service: None     Active member of club or organization: None     Attends meetings of clubs or organizations: None     Relationship status: None   • Intimate partner violence:     Fear of current or ex partner: None     Emotionally abused: None     Physically abused: None     Forced sexual activity: None   Other Topics Concern   • None   Social History Narrative   • None        Medications  Current Outpatient Medications on File Prior to Visit   Medication Sig Dispense Refill   • dicyclomine (BENTYL) 20 mg tablet Take 20 mg by mouth 4 (four) times a day     • ibuprofen (MOTRIN) 600 mg tablet Take 1 tablet (600 mg total) by mouth every 6 (six) hours as needed for mild pain 40 tablet 0   • methocarbamol (ROBAXIN) 500 mg tablet Take 1 tablet (500 mg total) by mouth 3 (three) times a day as needed for muscle spasms 30 tablet 0   • mirtazapine (REMERON SOL-TAB) 30 mg dispersible tablet Take 30 mg by mouth     • ziprasidone (GEODON) 80 mg capsule Take 80 mg by mouth     • LORazepam (ATIVAN) 1 mg tablet Take 1 tablet (1 mg total) by mouth 2 (two) times a day for 2 days 4 tablet 0   • methocarbamol (ROBAXIN) 750 mg tablet Take 1 tablet (750 mg total) by mouth every 6 (six) hours as needed for muscle spasms (Patient not taking: Reported on 3/10/2020) 60 tablet 0   • oxyCODONE (ROXICODONE) 5 mg immediate release tablet Take 1 tablet (5 mg total) by mouth every 6 (six) hours as needed for severe painMax Daily Amount: 20 mg (Patient not taking: Reported on 2/18/2020) 10 tablet 0     No current facility-administered medications on file prior to visit.        Allergies  Allergies   Allergen Reactions   • Gabapentin Anaphylaxis   • Adhesive [Medical Tape]  Rash       Review of Systems   Constitutional: Positive for activity change.   HENT: Negative.    Eyes: Negative.    Respiratory: Negative.    Cardiovascular: Negative.    Gastrointestinal: Negative.    Endocrine: Negative.    Genitourinary: Negative.    Musculoskeletal: Negative.    Skin: Negative.    Allergic/Immunologic: Negative.    Neurological: Negative.    Hematological: Negative.    Psychiatric/Behavioral: Negative.        Objective  Vitals:    03/10/20 1408   Pulse: 77   Temp: (!) 97.4 °F (36.3 °C)       Physical Exam   Constitutional: She is oriented to person, place, and time. She appears well-developed and well-nourished. No distress.   HENT:   Head: Normocephalic and atraumatic.   Right Ear: External ear normal.   Left Ear: External ear normal.   Nose: Nose normal.   Mouth/Throat: Oropharynx is clear and moist. No oropharyngeal exudate.   Eyes: Pupils are equal, round, and reactive to light. Conjunctivae and EOM are normal. Right eye exhibits no discharge. Left eye exhibits no discharge. No scleral icterus.   Neck: Normal range of motion. Neck supple. No JVD present. No tracheal deviation present. No thyromegaly present.   Cardiovascular: Normal rate, regular rhythm, normal heart sounds and intact distal pulses. Exam reveals no gallop and no friction rub.   No murmur heard.  Pulmonary/Chest: Effort normal and breath sounds normal. No stridor. No respiratory distress. She has no wheezes. She has no rales. She exhibits no tenderness.   Abdominal: Soft. Bowel sounds are normal. She exhibits no distension and no mass. There is no tenderness. There is no rebound and no guarding.   Genitourinary:   Genitourinary Comments: deferred   Musculoskeletal: Normal range of motion. She exhibits no edema, tenderness or deformity.   Lymphadenopathy:     She has no cervical adenopathy.   Neurological: She is alert and oriented to person, place, and time. She displays normal reflexes. No cranial nerve deficit or sensory  deficit. She exhibits normal muscle tone. Coordination normal.   Skin: Skin is warm and dry. Capillary refill takes less than 2 seconds. No rash noted. She is not diaphoretic. No erythema. No pallor.   Psychiatric: She has a normal mood and affect. Her behavior is normal. Thought content normal.

## 2020-03-10 NOTE — ASSESSMENT & PLAN NOTE
Breath sounds equal bilaterally  No shortness of breath  Pain controlled with Ibuprofen  No further follow up required

## 2020-05-29 ENCOUNTER — HOSPITAL ENCOUNTER (EMERGENCY)
Facility: HOSPITAL | Age: 45
Discharge: HOME | End: 2020-05-29
Attending: EMERGENCY MEDICINE | Admitting: EMERGENCY MEDICINE
Payer: COMMERCIAL

## 2020-05-29 VITALS
TEMPERATURE: 98.2 F | HEART RATE: 67 BPM | BODY MASS INDEX: 25.04 KG/M2 | OXYGEN SATURATION: 98 % | DIASTOLIC BLOOD PRESSURE: 88 MMHG | RESPIRATION RATE: 18 BRPM | WEIGHT: 169.53 LBS | SYSTOLIC BLOOD PRESSURE: 166 MMHG

## 2020-05-29 DIAGNOSIS — R11.2 NAUSEA AND VOMITING: Primary | ICD-10-CM

## 2020-05-29 LAB
ALBUMIN SERPL BCP-MCNC: 5 G/DL (ref 3–5.2)
ALP SERPL-CCNC: 131 U/L (ref 43–122)
ALT SERPL W P-5'-P-CCNC: 35 U/L (ref 9–52)
ANION GAP SERPL CALCULATED.3IONS-SCNC: 15 MMOL/L (ref 5–14)
AST SERPL W P-5'-P-CCNC: 29 U/L (ref 14–36)
ATRIAL RATE: 68 BPM
BASOPHILS # BLD AUTO: 0 THOUSANDS/ΜL (ref 0–0.1)
BASOPHILS NFR BLD AUTO: 0 % (ref 0–1)
BILIRUB SERPL-MCNC: 0.8 MG/DL
BUN SERPL-MCNC: 12 MG/DL (ref 5–25)
CALCIUM SERPL-MCNC: 9.8 MG/DL (ref 8.4–10.2)
CHLORIDE SERPL-SCNC: 95 MMOL/L (ref 97–108)
CO2 SERPL-SCNC: 26 MMOL/L (ref 22–30)
CREAT SERPL-MCNC: 0.76 MG/DL (ref 0.6–1.2)
EOSINOPHIL # BLD AUTO: 0 THOUSAND/ΜL (ref 0–0.4)
EOSINOPHIL NFR BLD AUTO: 0 % (ref 0–6)
ERYTHROCYTE [DISTWIDTH] IN BLOOD BY AUTOMATED COUNT: 12.9 %
EXT PREG TEST URINE: NEGATIVE
EXT. CONTROL ED NAV: NORMAL
GFR SERPL CREATININE-BSD FRML MDRD: 96 ML/MIN/1.73SQ M
GLUCOSE SERPL-MCNC: 159 MG/DL (ref 70–99)
HCT VFR BLD AUTO: 44.5 % (ref 36–46)
HGB BLD-MCNC: 15.2 G/DL (ref 12–16)
LYMPHOCYTES # BLD AUTO: 1.1 THOUSANDS/ΜL (ref 0.5–4)
LYMPHOCYTES NFR BLD AUTO: 8 % (ref 25–45)
MCH RBC QN AUTO: 32.7 PG (ref 26–34)
MCHC RBC AUTO-ENTMCNC: 34.1 G/DL (ref 31–36)
MCV RBC AUTO: 96 FL (ref 80–100)
MONOCYTES # BLD AUTO: 0.3 THOUSAND/ΜL (ref 0.2–0.9)
MONOCYTES NFR BLD AUTO: 2 % (ref 1–10)
NEUTROPHILS # BLD AUTO: 12.4 THOUSANDS/ΜL (ref 1.8–7.8)
NEUTS SEG NFR BLD AUTO: 90 % (ref 45–65)
PLATELET # BLD AUTO: 321 THOUSANDS/UL (ref 150–450)
PMV BLD AUTO: 8.2 FL (ref 8.9–12.7)
POTASSIUM SERPL-SCNC: 3.4 MMOL/L (ref 3.6–5)
PR INTERVAL: 152 MS
PROT SERPL-MCNC: 8.6 G/DL (ref 5.9–8.4)
QRS AXIS: 20 DEGREES
QRSD INTERVAL: 88 MS
QT INTERVAL: 458 MS
QTC INTERVAL: 487 MS
RBC # BLD AUTO: 4.64 MILLION/UL (ref 4–5.2)
SODIUM SERPL-SCNC: 136 MMOL/L (ref 137–147)
T WAVE AXIS: 28 DEGREES
VENTRICULAR RATE: 68 BPM
WBC # BLD AUTO: 13.9 THOUSAND/UL (ref 4.5–11)

## 2020-05-29 PROCEDURE — 99284 EMERGENCY DEPT VISIT MOD MDM: CPT

## 2020-05-29 PROCEDURE — 93005 ELECTROCARDIOGRAM TRACING: CPT

## 2020-05-29 PROCEDURE — 96375 TX/PRO/DX INJ NEW DRUG ADDON: CPT

## 2020-05-29 PROCEDURE — 36415 COLL VENOUS BLD VENIPUNCTURE: CPT | Performed by: EMERGENCY MEDICINE

## 2020-05-29 PROCEDURE — 99284 EMERGENCY DEPT VISIT MOD MDM: CPT | Performed by: EMERGENCY MEDICINE

## 2020-05-29 PROCEDURE — 85025 COMPLETE CBC W/AUTO DIFF WBC: CPT | Performed by: EMERGENCY MEDICINE

## 2020-05-29 PROCEDURE — 93010 ELECTROCARDIOGRAM REPORT: CPT | Performed by: INTERNAL MEDICINE

## 2020-05-29 PROCEDURE — 81025 URINE PREGNANCY TEST: CPT | Performed by: EMERGENCY MEDICINE

## 2020-05-29 PROCEDURE — 80053 COMPREHEN METABOLIC PANEL: CPT | Performed by: EMERGENCY MEDICINE

## 2020-05-29 PROCEDURE — 96365 THER/PROPH/DIAG IV INF INIT: CPT

## 2020-05-29 RX ORDER — FAMOTIDINE 40 MG/1
40 TABLET, FILM COATED ORAL
Qty: 20 TABLET | Refills: 0 | Status: SHIPPED | OUTPATIENT
Start: 2020-05-29

## 2020-05-29 RX ORDER — FAMOTIDINE 40 MG/1
40 TABLET, FILM COATED ORAL
Qty: 20 TABLET | Refills: 0 | Status: SHIPPED | OUTPATIENT
Start: 2020-05-29 | End: 2020-05-29 | Stop reason: SDUPTHER

## 2020-05-29 RX ORDER — MAGNESIUM HYDROXIDE/ALUMINUM HYDROXICE/SIMETHICONE 120; 1200; 1200 MG/30ML; MG/30ML; MG/30ML
30 SUSPENSION ORAL EVERY 4 HOURS PRN
Status: DISCONTINUED | OUTPATIENT
Start: 2020-05-29 | End: 2020-05-29 | Stop reason: HOSPADM

## 2020-05-29 RX ORDER — METOCLOPRAMIDE 10 MG/1
10 TABLET ORAL 4 TIMES DAILY
Qty: 15 TABLET | Refills: 0 | Status: SHIPPED | OUTPATIENT
Start: 2020-05-29 | End: 2021-11-22

## 2020-05-29 RX ORDER — METOCLOPRAMIDE HYDROCHLORIDE 5 MG/ML
10 INJECTION INTRAMUSCULAR; INTRAVENOUS ONCE
Status: COMPLETED | OUTPATIENT
Start: 2020-05-29 | End: 2020-05-29

## 2020-05-29 RX ORDER — MAGNESIUM SULFATE HEPTAHYDRATE 40 MG/ML
2 INJECTION, SOLUTION INTRAVENOUS ONCE
Status: COMPLETED | OUTPATIENT
Start: 2020-05-29 | End: 2020-05-29

## 2020-05-29 RX ORDER — LORAZEPAM 2 MG/ML
1 INJECTION INTRAMUSCULAR ONCE
Status: COMPLETED | OUTPATIENT
Start: 2020-05-29 | End: 2020-05-29

## 2020-05-29 RX ORDER — DIPHENHYDRAMINE HYDROCHLORIDE 50 MG/ML
50 INJECTION INTRAMUSCULAR; INTRAVENOUS ONCE
Status: COMPLETED | OUTPATIENT
Start: 2020-05-29 | End: 2020-05-29

## 2020-05-29 RX ORDER — KETOROLAC TROMETHAMINE 30 MG/ML
30 INJECTION, SOLUTION INTRAMUSCULAR; INTRAVENOUS ONCE
Status: COMPLETED | OUTPATIENT
Start: 2020-05-29 | End: 2020-05-29

## 2020-05-29 RX ORDER — SUCRALFATE ORAL 1 G/10ML
1 SUSPENSION ORAL 4 TIMES DAILY
Qty: 420 ML | Refills: 0 | Status: SHIPPED | OUTPATIENT
Start: 2020-05-29 | End: 2020-08-27 | Stop reason: SDUPTHER

## 2020-05-29 RX ORDER — SUCRALFATE ORAL 1 G/10ML
1 SUSPENSION ORAL 4 TIMES DAILY
Qty: 420 ML | Refills: 0 | Status: SHIPPED | OUTPATIENT
Start: 2020-05-29 | End: 2020-05-29 | Stop reason: SDUPTHER

## 2020-05-29 RX ORDER — ONDANSETRON 4 MG/1
4 TABLET, ORALLY DISINTEGRATING ORAL EVERY 8 HOURS PRN
Qty: 20 TABLET | Refills: 0 | Status: SHIPPED | OUTPATIENT
Start: 2020-05-29 | End: 2021-11-22

## 2020-05-29 RX ADMIN — ALUMINUM HYDROXIDE, MAGNESIUM HYDROXIDE, AND SIMETHICONE 30 ML: 200; 200; 20 SUSPENSION ORAL at 09:39

## 2020-05-29 RX ADMIN — SODIUM CHLORIDE 1000 ML: 0.9 INJECTION, SOLUTION INTRAVENOUS at 08:13

## 2020-05-29 RX ADMIN — DIPHENHYDRAMINE HYDROCHLORIDE 50 MG: 50 INJECTION, SOLUTION INTRAMUSCULAR; INTRAVENOUS at 08:13

## 2020-05-29 RX ADMIN — KETOROLAC TROMETHAMINE 30 MG: 30 INJECTION, SOLUTION INTRAMUSCULAR at 08:13

## 2020-05-29 RX ADMIN — METOCLOPRAMIDE 10 MG: 5 INJECTION, SOLUTION INTRAMUSCULAR; INTRAVENOUS at 08:13

## 2020-05-29 RX ADMIN — MAGNESIUM SULFATE IN WATER 2 G: 40 INJECTION, SOLUTION INTRAVENOUS at 08:14

## 2020-05-29 RX ADMIN — LORAZEPAM 1 MG: 2 INJECTION INTRAMUSCULAR at 08:13

## 2020-08-27 ENCOUNTER — APPOINTMENT (EMERGENCY)
Dept: CT IMAGING | Facility: HOSPITAL | Age: 45
End: 2020-08-27
Payer: COMMERCIAL

## 2020-08-27 ENCOUNTER — HOSPITAL ENCOUNTER (EMERGENCY)
Facility: HOSPITAL | Age: 45
Discharge: HOME | End: 2020-08-27
Attending: EMERGENCY MEDICINE | Admitting: EMERGENCY MEDICINE
Payer: COMMERCIAL

## 2020-08-27 VITALS
WEIGHT: 167.33 LBS | OXYGEN SATURATION: 98 % | BODY MASS INDEX: 24.71 KG/M2 | DIASTOLIC BLOOD PRESSURE: 95 MMHG | HEART RATE: 52 BPM | SYSTOLIC BLOOD PRESSURE: 165 MMHG | RESPIRATION RATE: 16 BRPM | TEMPERATURE: 99.8 F

## 2020-08-27 DIAGNOSIS — E87.1 HYPONATREMIA: Primary | ICD-10-CM

## 2020-08-27 DIAGNOSIS — R10.13 EPIGASTRIC PAIN: ICD-10-CM

## 2020-08-27 DIAGNOSIS — R03.0 ELEVATED BLOOD PRESSURE READING: ICD-10-CM

## 2020-08-27 DIAGNOSIS — R74.8 ELEVATED LIPASE: ICD-10-CM

## 2020-08-27 DIAGNOSIS — R11.2 NAUSEA AND VOMITING: ICD-10-CM

## 2020-08-27 DIAGNOSIS — E87.6 HYPOKALEMIA: ICD-10-CM

## 2020-08-27 LAB
ALBUMIN SERPL BCP-MCNC: 4.3 G/DL (ref 3–5.2)
ALP SERPL-CCNC: 131 U/L (ref 43–122)
ALT SERPL W P-5'-P-CCNC: 22 U/L (ref 9–52)
AMORPH URATE CRY URNS QL MICRO: ABNORMAL /HPF
ANION GAP SERPL CALCULATED.3IONS-SCNC: 9 MMOL/L (ref 5–14)
AST SERPL W P-5'-P-CCNC: 26 U/L (ref 14–36)
BACTERIA UR QL AUTO: ABNORMAL /HPF
BASOPHILS # BLD AUTO: 0.15 THOUSAND/UL (ref 0–0.1)
BASOPHILS NFR MAR MANUAL: 1 % (ref 0–1)
BILIRUB SERPL-MCNC: 0.7 MG/DL
BILIRUB UR QL STRIP: NEGATIVE
BUN SERPL-MCNC: 7 MG/DL (ref 5–25)
CALCIUM SERPL-MCNC: 9.3 MG/DL (ref 8.4–10.2)
CHLORIDE SERPL-SCNC: 101 MMOL/L (ref 97–108)
CLARITY UR: CLEAR
CO2 SERPL-SCNC: 26 MMOL/L (ref 22–30)
COLOR UR: ABNORMAL
CREAT SERPL-MCNC: 0.81 MG/DL (ref 0.6–1.2)
ERYTHROCYTE [DISTWIDTH] IN BLOOD BY AUTOMATED COUNT: 15.3 %
EXT PREG TEST URINE: NEGATIVE
EXT. CONTROL ED NAV: NORMAL
GFR SERPL CREATININE-BSD FRML MDRD: 89 ML/MIN/1.73SQ M
GLUCOSE SERPL-MCNC: 122 MG/DL (ref 70–99)
GLUCOSE UR STRIP-MCNC: NEGATIVE MG/DL
HCT VFR BLD AUTO: 43.9 % (ref 36–46)
HGB BLD-MCNC: 14.8 G/DL (ref 12–16)
HGB UR QL STRIP.AUTO: 25
KETONES UR STRIP-MCNC: ABNORMAL MG/DL
LEUKOCYTE ESTERASE UR QL STRIP: 100
LIPASE SERPL-CCNC: 413 U/L (ref 23–300)
LYMPHOCYTES # BLD AUTO: 1.08 THOUSAND/UL (ref 0.5–4)
LYMPHOCYTES # BLD AUTO: 7 % (ref 25–45)
MCH RBC QN AUTO: 33 PG (ref 26–34)
MCHC RBC AUTO-ENTMCNC: 33.7 G/DL (ref 31–36)
MCV RBC AUTO: 98 FL (ref 80–100)
MONOCYTES # BLD AUTO: 0.46 THOUSAND/UL (ref 0.2–0.9)
MONOCYTES NFR BLD AUTO: 3 % (ref 1–10)
MUCOUS THREADS UR QL AUTO: ABNORMAL
NEUTS SEG # BLD: 13.71 THOUSAND/UL (ref 1.8–7.8)
NEUTS SEG NFR BLD AUTO: 89 %
NITRITE UR QL STRIP: NEGATIVE
NON-SQ EPI CELLS URNS QL MICRO: ABNORMAL /HPF
PH UR STRIP.AUTO: 6 [PH]
PLATELET # BLD AUTO: 518 THOUSANDS/UL (ref 150–450)
PLATELET BLD QL SMEAR: ABNORMAL
PMV BLD AUTO: 7.2 FL (ref 8.9–12.7)
POTASSIUM SERPL-SCNC: 3.3 MMOL/L (ref 3.6–5)
PROT SERPL-MCNC: 8.1 G/DL (ref 5.9–8.4)
PROT UR STRIP-MCNC: ABNORMAL MG/DL
RBC # BLD AUTO: 4.48 MILLION/UL (ref 4–5.2)
RBC #/AREA URNS AUTO: ABNORMAL /HPF
RBC MORPH BLD: NORMAL
SODIUM SERPL-SCNC: 136 MMOL/L (ref 137–147)
SP GR UR STRIP.AUTO: 1.02 (ref 1–1.04)
TOTAL CELLS COUNTED SPEC: 100
UROBILINOGEN UA: 1 MG/DL
WBC # BLD AUTO: 15.4 THOUSAND/UL (ref 4.5–11)
WBC #/AREA URNS AUTO: ABNORMAL /HPF

## 2020-08-27 PROCEDURE — 96376 TX/PRO/DX INJ SAME DRUG ADON: CPT

## 2020-08-27 PROCEDURE — 74177 CT ABD & PELVIS W/CONTRAST: CPT

## 2020-08-27 PROCEDURE — 83690 ASSAY OF LIPASE: CPT | Performed by: PHYSICIAN ASSISTANT

## 2020-08-27 PROCEDURE — G1004 CDSM NDSC: HCPCS

## 2020-08-27 PROCEDURE — 99285 EMERGENCY DEPT VISIT HI MDM: CPT

## 2020-08-27 PROCEDURE — 85027 COMPLETE CBC AUTOMATED: CPT | Performed by: PHYSICIAN ASSISTANT

## 2020-08-27 PROCEDURE — 36415 COLL VENOUS BLD VENIPUNCTURE: CPT | Performed by: PHYSICIAN ASSISTANT

## 2020-08-27 PROCEDURE — 96375 TX/PRO/DX INJ NEW DRUG ADDON: CPT

## 2020-08-27 PROCEDURE — 99285 EMERGENCY DEPT VISIT HI MDM: CPT | Performed by: PHYSICIAN ASSISTANT

## 2020-08-27 PROCEDURE — 81025 URINE PREGNANCY TEST: CPT | Performed by: PHYSICIAN ASSISTANT

## 2020-08-27 PROCEDURE — 80053 COMPREHEN METABOLIC PANEL: CPT | Performed by: PHYSICIAN ASSISTANT

## 2020-08-27 PROCEDURE — 96374 THER/PROPH/DIAG INJ IV PUSH: CPT

## 2020-08-27 PROCEDURE — 81003 URINALYSIS AUTO W/O SCOPE: CPT | Performed by: PHYSICIAN ASSISTANT

## 2020-08-27 PROCEDURE — 96361 HYDRATE IV INFUSION ADD-ON: CPT

## 2020-08-27 PROCEDURE — C9113 INJ PANTOPRAZOLE SODIUM, VIA: HCPCS | Performed by: PHYSICIAN ASSISTANT

## 2020-08-27 PROCEDURE — 81001 URINALYSIS AUTO W/SCOPE: CPT | Performed by: PHYSICIAN ASSISTANT

## 2020-08-27 PROCEDURE — 85007 BL SMEAR W/DIFF WBC COUNT: CPT | Performed by: PHYSICIAN ASSISTANT

## 2020-08-27 RX ORDER — SUCRALFATE ORAL 1 G/10ML
1 SUSPENSION ORAL 4 TIMES DAILY
Qty: 420 ML | Refills: 0 | Status: SHIPPED | OUTPATIENT
Start: 2020-08-27 | End: 2021-11-22

## 2020-08-27 RX ORDER — ONDANSETRON 2 MG/ML
4 INJECTION INTRAMUSCULAR; INTRAVENOUS ONCE
Status: COMPLETED | OUTPATIENT
Start: 2020-08-27 | End: 2020-08-27

## 2020-08-27 RX ORDER — DIPHENHYDRAMINE HYDROCHLORIDE 50 MG/ML
25 INJECTION INTRAMUSCULAR; INTRAVENOUS ONCE
Status: COMPLETED | OUTPATIENT
Start: 2020-08-27 | End: 2020-08-27

## 2020-08-27 RX ORDER — FENTANYL CITRATE 50 UG/ML
50 INJECTION, SOLUTION INTRAMUSCULAR; INTRAVENOUS ONCE
Status: COMPLETED | OUTPATIENT
Start: 2020-08-27 | End: 2020-08-27

## 2020-08-27 RX ORDER — METOCLOPRAMIDE HYDROCHLORIDE 5 MG/ML
10 INJECTION INTRAMUSCULAR; INTRAVENOUS ONCE
Status: COMPLETED | OUTPATIENT
Start: 2020-08-27 | End: 2020-08-27

## 2020-08-27 RX ORDER — PANTOPRAZOLE SODIUM 40 MG/1
40 INJECTION, POWDER, FOR SOLUTION INTRAVENOUS ONCE
Status: COMPLETED | OUTPATIENT
Start: 2020-08-27 | End: 2020-08-27

## 2020-08-27 RX ORDER — MORPHINE SULFATE 4 MG/ML
4 INJECTION, SOLUTION INTRAMUSCULAR; INTRAVENOUS ONCE
Status: COMPLETED | OUTPATIENT
Start: 2020-08-27 | End: 2020-08-27

## 2020-08-27 RX ORDER — KETOROLAC TROMETHAMINE 30 MG/ML
15 INJECTION, SOLUTION INTRAMUSCULAR; INTRAVENOUS ONCE
Status: COMPLETED | OUTPATIENT
Start: 2020-08-27 | End: 2020-08-27

## 2020-08-27 RX ADMIN — KETOROLAC TROMETHAMINE 15 MG: 30 INJECTION, SOLUTION INTRAMUSCULAR; INTRAVENOUS at 19:06

## 2020-08-27 RX ADMIN — PANTOPRAZOLE SODIUM 40 MG: 40 INJECTION, POWDER, LYOPHILIZED, FOR SOLUTION INTRAVENOUS at 19:06

## 2020-08-27 RX ADMIN — DIPHENHYDRAMINE HYDROCHLORIDE 25 MG: 50 INJECTION INTRAMUSCULAR; INTRAVENOUS at 19:07

## 2020-08-27 RX ADMIN — ONDANSETRON 4 MG: 2 INJECTION INTRAMUSCULAR; INTRAVENOUS at 20:53

## 2020-08-27 RX ADMIN — FENTANYL CITRATE 50 MCG: 50 INJECTION INTRAMUSCULAR; INTRAVENOUS at 19:40

## 2020-08-27 RX ADMIN — MORPHINE SULFATE 4 MG: 4 INJECTION INTRAVENOUS at 20:54

## 2020-08-27 RX ADMIN — METOCLOPRAMIDE 10 MG: 5 INJECTION, SOLUTION INTRAMUSCULAR; INTRAVENOUS at 19:07

## 2020-08-27 RX ADMIN — SODIUM CHLORIDE 1000 ML: 0.9 INJECTION, SOLUTION INTRAVENOUS at 19:12

## 2020-08-27 RX ADMIN — ONDANSETRON 4 MG: 2 INJECTION INTRAMUSCULAR; INTRAVENOUS at 19:06

## 2020-08-27 RX ADMIN — IOHEXOL 100 ML: 350 INJECTION, SOLUTION INTRAVENOUS at 19:50

## 2020-08-27 NOTE — ED PROVIDER NOTES
History  Chief Complaint   Patient presents with    Abdominal Pain     Patient complaining of abdominal pain  40 yo F with PMH IBS, HTN uncontrolled, thyroid disease presenting for evaluation of generalized abdominal pain, n/v   Patient reports Tuesday she was eating pasta with red sauce, candy and ginger rail when in the middle of the night she started with nausea vomiting and epigastric pain  She states that she has been dealing with the symptoms on and off since she was 25years old  Patient reports she at times to manage her symptoms at but does have to come the ED when those management techniques do not work  She states that she took a Geodon, Prilosec and Pepcid without relief  No fevers, chills or sweats  No dysuria  No sick contacts  Prior to Admission Medications   Prescriptions Last Dose Informant Patient Reported? Taking?    LORazepam (ATIVAN) 1 mg tablet   No No   Sig: Take 1 tablet (1 mg total) by mouth 2 (two) times a day for 2 days   dicyclomine (BENTYL) 20 mg tablet   Yes No   Sig: Take 20 mg by mouth 4 (four) times a day   famotidine (PEPCID) 40 MG tablet   No No   Sig: Take 1 tablet (40 mg total) by mouth daily at bedtime as needed for heartburn   ibuprofen (MOTRIN) 600 mg tablet   No No   Sig: Take 1 tablet (600 mg total) by mouth every 6 (six) hours as needed for mild pain   methocarbamol (ROBAXIN) 500 mg tablet   No No   Sig: Take 1 tablet (500 mg total) by mouth 3 (three) times a day as needed for muscle spasms   methocarbamol (ROBAXIN) 750 mg tablet   No No   Sig: Take 1 tablet (750 mg total) by mouth every 6 (six) hours as needed for muscle spasms   Patient not taking: Reported on 3/10/2020   metoclopramide (Reglan) 10 mg tablet   No No   Sig: Take 1 tablet (10 mg total) by mouth 4 (four) times a day As needed for nausea   mirtazapine (REMERON SOL-TAB) 30 mg dispersible tablet   Yes No   Sig: Take 30 mg by mouth   ondansetron (ZOFRAN-ODT) 4 mg disintegrating tablet   No No Sig: Take 1 tablet (4 mg total) by mouth every 8 (eight) hours as needed for nausea or vomiting   oxyCODONE (ROXICODONE) 5 mg immediate release tablet   No No   Sig: Take 1 tablet (5 mg total) by mouth every 6 (six) hours as needed for severe painMax Daily Amount: 20 mg   Patient not taking: Reported on 2/18/2020   sucralfate (CARAFATE) 1 g/10 mL suspension   No No   Sig: Take 10 mL (1 g total) by mouth 4 (four) times a day   sucralfate (CARAFATE) 1 g/10 mL suspension   No No   Sig: Take 10 mL (1 g total) by mouth 4 (four) times a day   ziprasidone (GEODON) 80 mg capsule   Yes No   Sig: Take 80 mg by mouth      Facility-Administered Medications: None       Past Medical History:   Diagnosis Date    Cardiomyopathy ([address] )     Chest pain     Diarrhea     Elevated transaminase measurement     History of vitamin D deficiency     Irritable bowel syndrome (IBS)     with Constipation    Malaise and fatigue     Nutritional deficiency     Overactive thyroid gland     Pain in joint involving multiple sites     Palpitations     Sweating abnormality     Vaginal infection     Vitamin D insufficiency        Past Surgical History:   Procedure Laterality Date    ANKLE SURGERY Left     FINGER SURGERY      TUBAL LIGATION      UPPER GASTROINTESTINAL ENDOSCOPY         Family History   Problem Relation Age of Onset    No Known Problems Mother     Diabetes Father     Cancer Father      I have reviewed and agree with the history as documented  E-Cigarette/Vaping    E-Cigarette Use Never User      E-Cigarette/Vaping Substances     Social History     Tobacco Use    Smoking status: Current Every Day Smoker     Packs/day: 0 50    Smokeless tobacco: Never Used   Substance Use Topics    Alcohol use: Yes     Comment: occasionally    Drug use: Yes     Types: Marijuana     Comment: occasionally       Review of Systems   All other systems reviewed and are negative        Physical Exam  Physical Exam  Vitals signs and nursing note reviewed  Constitutional:       General: She is not in acute distress  Appearance: She is well-developed  She is obese  She is not ill-appearing, toxic-appearing or diaphoretic  Comments: Pt with hiccups, pin point pupils, appears under the influence of illegal substance vs ?geodon   HENT:      Head: Normocephalic and atraumatic  Eyes:      Conjunctiva/sclera: Conjunctivae normal       Comments: EOM grossly intact   Neck:      Musculoskeletal: Normal range of motion and neck supple  Vascular: No JVD  Cardiovascular:      Rate and Rhythm: Normal rate  Pulmonary:      Effort: Pulmonary effort is normal  No respiratory distress  Breath sounds: No wheezing or rhonchi  Abdominal:      Palpations: Abdomen is soft  Tenderness: There is abdominal tenderness (no RLQ or LLQ tenderness to palpation) in the epigastric area  Musculoskeletal:      Comments: FROM, steady gait, cap refill brisk, strength and sensation grossly intact throughout   Skin:     General: Skin is warm and dry  Capillary Refill: Capillary refill takes less than 2 seconds  Neurological:      Mental Status: She is alert and oriented to person, place, and time     Psychiatric:         Behavior: Behavior normal          Vital Signs  ED Triage Vitals [08/27/20 1853]   Temperature Pulse Respirations Blood Pressure SpO2   99 8 °F (37 7 °C) (!) 51 16 167/100 100 %      Temp Source Heart Rate Source Patient Position - Orthostatic VS BP Location FiO2 (%)   Tympanic Monitor Lying Left arm --      Pain Score       Worst Possible Pain           Vitals:    08/27/20 1853 08/27/20 2034   BP: 167/100 165/95   Pulse: (!) 51 (!) 52   Patient Position - Orthostatic VS: Lying Lying         Visual Acuity      ED Medications  Medications   ondansetron (ZOFRAN) injection 4 mg (has no administration in time range)   morphine (PF) 4 mg/mL injection 4 mg (has no administration in time range)   sodium chloride 0 9 % bolus 1,000 mL (1,000 mL Intravenous New Bag 8/27/20 1912)   ketorolac (TORADOL) injection 15 mg (15 mg Intravenous Given 8/27/20 1906)   pantoprazole (PROTONIX) injection 40 mg (40 mg Intravenous Given 8/27/20 1906)   ondansetron (ZOFRAN) injection 4 mg (4 mg Intravenous Given 8/27/20 1906)   metoclopramide (REGLAN) injection 10 mg (10 mg Intravenous Given 8/27/20 1907)   diphenhydrAMINE (BENADRYL) injection 25 mg (25 mg Intravenous Given 8/27/20 1907)   fentanyl citrate (PF) 100 MCG/2ML 50 mcg (50 mcg Intravenous Given 8/27/20 1940)   iohexol (OMNIPAQUE) 350 MG/ML injection (SINGLE-DOSE) 100 mL (100 mL Intravenous Given 8/27/20 1950)       Diagnostic Studies  Results Reviewed     Procedure Component Value Units Date/Time    Urine Microscopic [[id number]]  (Abnormal) Collected:  08/27/20 1905    Lab Status:  Final result Specimen:  Urine, Other Updated:  08/27/20 1927     RBC, UA 0-1 /hpf      WBC, UA 1-2 /hpf      Epithelial Cells Innumerable /hpf      Bacteria, UA Occasional /hpf      AMORPH URATES Moderate /hpf      MUCUS THREADS Innumerable    UA w Reflex to Microscopic w Reflex to Culture [[id number]]  (Abnormal) Collected:  08/27/20 1905    Lab Status:  Final result Specimen:  Urine, Other Updated:  08/27/20 1920     Color, UA [place]     Clarity, UA Clear     Specific Gravity, UA 1 025     pH, UA 6 0     Leukocytes,  0     Nitrite, UA Negative     Protein, UA 30 (1+) mg/dl      Glucose, UA Negative mg/dl      Ketones, UA 15 (1+) mg/dl      Bilirubin, UA Negative     Blood, UA 25 0     UROBILINOGEN UA 1 0 mg/dL     Comprehensive metabolic panel [[id number]]  (Abnormal) Collected:  08/27/20 1902    Lab Status:  Final result Specimen:  Blood from Arm, Left Updated:  08/27/20 1918     Sodium 136 mmol/L      Potassium 3 3 mmol/L      Chloride 101 mmol/L      CO2 26 mmol/L      ANION GAP 9 mmol/L      BUN 7 mg/dL      Creatinine 0 81 mg/dL      Glucose 122 mg/dL      Calcium 9 3 mg/dL      AST 26 U/L      ALT 22 U/L Alkaline Phosphatase 131 U/L      Total Protein 8 1 g/dL      Albumin 4 3 g/dL      Total Bilirubin 0 70 mg/dL      eGFR 89 ml/min/1 73sq m     Narrative:       [place] guidelines for Chronic Kidney Disease (CKD):     Stage 1 with normal or high GFR (GFR > 90 mL/min/1 73 square meters)    Stage 2 Mild CKD (GFR = 60-89 mL/min/1 73 square meters)    Stage 3A Moderate CKD (GFR = 45-59 mL/min/1 73 square meters)    Stage 3B Moderate CKD (GFR = 30-44 mL/min/1 73 square meters)    Stage 4 Severe CKD (GFR = 15-29 mL/min/1 73 square meters)    Stage 5 End Stage CKD (GFR <15 mL/min/1 73 square meters)  Note: GFR calculation is accurate only with a steady state creatinine    Lipase [[id number]]  (Abnormal) Collected:  08/27/20 1902    Lab Status:  Final result Specimen:  Blood from Arm, Left Updated:  08/27/20 1918     Lipase 413 u/L     CBC and differential [[id number]]  (Abnormal) Collected:  08/27/20 1902    Lab Status:  Final result Specimen:  Blood from Arm, Left Updated:  08/27/20 1909     WBC 15 40 Thousand/uL      RBC 4 48 Million/uL      Hemoglobin 14 8 g/dL      Hematocrit 43 9 %      MCV 98 fL      MCH 33 0 pg      MCHC 33 7 g/dL      RDW 15 3 %      MPV 7 2 fL      Platelets 844 Thousands/uL     POCT pregnancy, urine [[id number]]  (Normal) Resulted:  08/27/20 1900    Lab Status:  Final result Updated:  08/27/20 1907     EXT PREG TEST UR (Ref: Negative) negative     Control valid                 CT abdomen pelvis with contrast   Final Result by Tereza Albarado MD (08/27 2024)   1  No acute intra-abdominal pathology                    Workstation performed: [id number]                    Procedures  Procedures         ED Course  ED Course as of Aug 27 2053   Thu Aug 27, 2020   1910 Likely reactive, pt reports chronic history of same symptoms   WBC(!): 15 40   1932  came out of the room asking for ice water for the pt, advised that she can not have anything to eat or drink at this time until CT complete, now he is stating that the pain meds 'havent kicked in' will order more meds        2047 Pt stating I want more nausea medicine, more pain medicine and I want to go home          US AUDIT      Most Recent Value   Initial Alcohol Screen: US AUDIT-C    1  How often do you have a drink containing alcohol? 2 Filed at: 08/27/2020 1858   2  How many drinks containing alcohol do you have on a typical day you are drinking? 0 Filed at: 08/27/2020 1858   3b  FEMALE Any Age, or MALE 65+: How often do you have 4 or more drinks on one occassion? 0 Filed at: 08/27/2020 1858   Audit-C Score  2 Filed at: 08/27/2020 1858                  PRABHJOT/DAST-10      Most Recent Value   How many times in the past year have you    Used an illegal drug or used a prescription medication for non-medical reasons? Weekly [cocaine or marijuana occasionally did some 1 week ago] Filed at: 08/27/2020 1858   In the past 12 months      1  Have you used drugs other than those required for medical reasons? 1 Filed at: 08/27/2020 1858   2  Do you use more than one drug at a time? 0 Filed at: 08/27/2020 1858   3  Have you had medical problems as a result of your drug use (e g , memory loss, hepatitis, convulsions, bleeding, etc )? 0 Filed at: 08/27/2020 1858   4  Have you had "blackouts" or "flashbacks" as a result of drug use? YesNo  0 Filed at: 08/27/2020 1858   5  Do you ever feel bad or guilty about your drug use? 0 Filed at: 08/27/2020 1858   6  Does your spouse (or parent) ever complain about your involvement with drugs? 0 Filed at: 08/27/2020 1858   7  Have you neglected your family because of your use of drugs? 0 Filed at: 08/27/2020 1858   8  Have you engaged in illegal activities in order to obtain drugs? 0 Filed at: 08/27/2020 1858   9  Have you ever experienced withdrawal symptoms (felt sick) when you stopped taking drugs? 0 Filed at: 08/27/2020 1858   10   Are you always able to stop using drugs when you want to?  0 Filed at: 08/27/2020 1858   DAST-10 Score  1 Filed at: 08/27/2020 1858                                MDM  Number of Diagnoses or Management Options  Elevated blood pressure reading:   Elevated lipase:   Epigastric pain:   Hypokalemia:   Hyponatremia:   Nausea and vomiting:   Diagnosis management comments: 53-year-old female presenting for evaluation acute exacerbation of chronic abdominal pain, nausea and vomiting, patient did have elevated white count and elevated lipase, CT was completed which showed no acute abnormalities, patient was given multiple antiemetics and pain medication, she is asking to go home at this time stating that she has been dealing with this pain since she is 25years old, f/u with pcp and GI as an outpatient    All labs and imaging discussed with patient, strict return to ED precautions discussed  Pt verbalizes understanding and agrees with plan  Pt is stable for discharge    Portions of the record may have been created with voice recognition software  Occasional wrong word or "sound a like" substitutions may have occurred due to the inherent limitations of voice recognition software  Read the chart carefully and recognize, using context, where substitutions have occurred            Disposition  Final diagnoses:   Hyponatremia   Hypokalemia   Epigastric pain   Nausea and vomiting   Elevated blood pressure reading   Elevated lipase     Time reflects when diagnosis was documented in both MDM as applicable and the Disposition within this note     Time User Action Codes Description Comment    8/27/2020  7:24 PM [place] Add [E87 1] Hyponatremia     8/27/2020  7:24 PM [place] Add [E87 6] Hypokalemia     8/27/2020  7:24 PM [place] Ner[place] Add [R10 13] Epigastric pain     8/27/2020  7:24 PM [place] Nerissa Add [R11 2] Nausea and vomiting     8/27/2020  7:24 PM [place] Ner[place] Add [R03 0] Elevated blood pressure reading     8/27/2020  8:48 PM [place] Ner[place] Add [R74 8] Elevated lipase       ED Disposition     ED Disposition Condition Date/Time Comment    Discharge Stable Thu Aug 27, 2020  8:48 PM Ze Puls discharge to home/self care  Follow-up Information     Follow up With Specialties Details Why [address] [facility] Internal Medicine Call in 1 day  [address] [facility]  [phone]            Current Discharge Medication List      CONTINUE these medications which have CHANGED    Details   sucralfate (CARAFATE) 1 g/10 mL suspension Take 10 mL (1 g total) by mouth 4 (four) times a day  Qty: 420 mL, Refills: 0    Associated Diagnoses: Nausea and vomiting         CONTINUE these medications which have NOT CHANGED    Details   dicyclomine (BENTYL) 20 mg tablet Take 20 mg by mouth 4 (four) times a day      famotidine (PEPCID) 40 MG tablet Take 1 tablet (40 mg total) by mouth daily at bedtime as needed for heartburn  Qty: 20 tablet, Refills: 0    Associated Diagnoses: Nausea and vomiting      ibuprofen (MOTRIN) 600 mg tablet Take 1 tablet (600 mg total) by mouth every 6 (six) hours as needed for mild pain  Qty: 40 tablet, Refills: 0    Associated Diagnoses: Rib fractures      LORazepam (ATIVAN) 1 mg tablet Take 1 tablet (1 mg total) by mouth 2 (two) times a day for 2 days  Qty: 4 tablet, Refills: 0    Associated Diagnoses: Nausea & vomiting; Abdominal pain; Cocaine abuse ([address] )      ! ! methocarbamol (ROBAXIN) 500 mg tablet Take 1 tablet (500 mg total) by mouth 3 (three) times a day as needed for muscle spasms  Qty: 30 tablet, Refills: 0    Associated Diagnoses: Closed fracture of multiple ribs of left side with routine healing, subsequent encounter      !! methocarbamol (ROBAXIN) 750 mg tablet Take 1 tablet (750 mg total) by mouth every 6 (six) hours as needed for muscle spasms  Qty: 60 tablet, Refills: 0    Associated Diagnoses: Rib fractures      metoclopramide (Reglan) 10 mg tablet Take 1 tablet (10 mg total) by mouth 4 (four) times a day As needed for nausea  Qty: 15 tablet, Refills: 0    Associated Diagnoses: Nausea and vomiting      mirtazapine (REMERON SOL-TAB) 30 mg dispersible tablet Take 30 mg by mouth      ondansetron (ZOFRAN-ODT) 4 mg disintegrating tablet Take 1 tablet (4 mg total) by mouth every 8 (eight) hours as needed for nausea or vomiting  Qty: 20 tablet, Refills: 0    Associated Diagnoses: Nausea and vomiting      oxyCODONE (ROXICODONE) 5 mg immediate release tablet Take 1 tablet (5 mg total) by mouth every 6 (six) hours as needed for severe painMax Daily Amount: 20 mg  Qty: 10 tablet, Refills: 0    Associated Diagnoses: Closed fracture of multiple ribs of left side with routine healing, subsequent encounter      ziprasidone (GEODON) 80 mg capsule Take 80 mg by mouth       !! - Potential duplicate medications found  Please discuss with provider  No discharge procedures on file      PDMP Review     None          ED Provider  Electronically Signed by           Toñito Ashton PA-C  08/27/20 2897

## 2020-08-27 NOTE — ED NOTES
Pt transported to [address] [place] [address], [address] [place] [address]  08/27/20 2511

## 2020-08-28 NOTE — ED ATTENDING ATTESTATION
I was the attending physician on duty at the time the patient visited the emergency department  The patient was evaluated and dispositioned by the APC  I was personally available for consultation  I am administratively signing the chart after the fact      Stacie Skinner MD

## 2020-09-09 DIAGNOSIS — S22.49XA RIB FRACTURES: ICD-10-CM

## 2020-09-29 RX ORDER — METHOCARBAMOL 750 MG/1
750 TABLET, FILM COATED ORAL EVERY 6 HOURS PRN
Qty: 60 TABLET | Refills: 0 | OUTPATIENT
Start: 2020-09-29

## 2021-02-02 ENCOUNTER — APPOINTMENT (EMERGENCY)
Dept: RADIOLOGY | Facility: HOSPITAL | Age: 46
End: 2021-02-02
Payer: COMMERCIAL

## 2021-02-02 ENCOUNTER — HOSPITAL ENCOUNTER (EMERGENCY)
Facility: HOSPITAL | Age: 46
Discharge: HOME | End: 2021-02-02
Attending: EMERGENCY MEDICINE | Admitting: EMERGENCY MEDICINE
Payer: COMMERCIAL

## 2021-02-02 ENCOUNTER — APPOINTMENT (EMERGENCY)
Dept: CT IMAGING | Facility: HOSPITAL | Age: 46
End: 2021-02-02
Payer: COMMERCIAL

## 2021-02-02 VITALS
HEART RATE: 55 BPM | OXYGEN SATURATION: 97 % | DIASTOLIC BLOOD PRESSURE: 53 MMHG | TEMPERATURE: 98.8 F | RESPIRATION RATE: 20 BRPM | SYSTOLIC BLOOD PRESSURE: 104 MMHG

## 2021-02-02 DIAGNOSIS — R51.9 HEADACHE: Primary | ICD-10-CM

## 2021-02-02 DIAGNOSIS — R94.31 PROLONGED Q-T INTERVAL ON ECG: ICD-10-CM

## 2021-02-02 DIAGNOSIS — R11.2 NAUSEA & VOMITING: ICD-10-CM

## 2021-02-02 LAB
ALBUMIN SERPL BCP-MCNC: 4.7 G/DL (ref 3–5.2)
ALP SERPL-CCNC: 116 U/L (ref 43–122)
ALT SERPL W P-5'-P-CCNC: 18 U/L (ref 9–52)
ANION GAP SERPL CALCULATED.3IONS-SCNC: 14 MMOL/L (ref 5–14)
AST SERPL W P-5'-P-CCNC: 26 U/L (ref 14–36)
BACTERIA UR QL AUTO: ABNORMAL /HPF
BASOPHILS # BLD AUTO: 0.1 THOUSANDS/ΜL (ref 0–0.1)
BASOPHILS NFR BLD AUTO: 1 % (ref 0–1)
BILIRUB SERPL-MCNC: 1.1 MG/DL
BILIRUB UR QL STRIP: NEGATIVE
BUN SERPL-MCNC: 10 MG/DL (ref 5–25)
CALCIUM SERPL-MCNC: 9.8 MG/DL (ref 8.4–10.2)
CHLORIDE SERPL-SCNC: 96 MMOL/L (ref 97–108)
CLARITY UR: CLEAR
CO2 SERPL-SCNC: 25 MMOL/L (ref 22–30)
COLOR UR: YELLOW
CREAT SERPL-MCNC: 0.71 MG/DL (ref 0.6–1.2)
EOSINOPHIL # BLD AUTO: 0 THOUSAND/ΜL (ref 0–0.4)
EOSINOPHIL NFR BLD AUTO: 0 % (ref 0–6)
ERYTHROCYTE [DISTWIDTH] IN BLOOD BY AUTOMATED COUNT: 14.1 %
EXT PREG TEST URINE: NEGATIVE
EXT. CONTROL ED NAV: NORMAL
GFR SERPL CREATININE-BSD FRML MDRD: 105 ML/MIN/1.73SQ M
GLUCOSE SERPL-MCNC: 132 MG/DL (ref 70–99)
GLUCOSE UR STRIP-MCNC: NEGATIVE MG/DL
HCT VFR BLD AUTO: 45.2 % (ref 36–46)
HGB BLD-MCNC: 15 G/DL (ref 12–16)
HGB UR QL STRIP.AUTO: 10
KETONES UR STRIP-MCNC: ABNORMAL MG/DL
LEUKOCYTE ESTERASE UR QL STRIP: NEGATIVE
LIPASE SERPL-CCNC: 232 U/L (ref 23–300)
LYMPHOCYTES # BLD AUTO: 1.7 THOUSANDS/ΜL (ref 0.5–4)
LYMPHOCYTES NFR BLD AUTO: 13 % (ref 25–45)
MAGNESIUM SERPL-MCNC: 1.9 MG/DL (ref 1.6–2.3)
MCH RBC QN AUTO: 31.5 PG (ref 26–34)
MCHC RBC AUTO-ENTMCNC: 33.2 G/DL (ref 31–36)
MCV RBC AUTO: 95 FL (ref 80–100)
MONOCYTES # BLD AUTO: 0.9 THOUSAND/ΜL (ref 0.2–0.9)
MONOCYTES NFR BLD AUTO: 8 % (ref 1–10)
MUCOUS THREADS UR QL AUTO: ABNORMAL
NEUTROPHILS # BLD AUTO: 9.7 THOUSANDS/ΜL (ref 1.8–7.8)
NEUTS SEG NFR BLD AUTO: 79 % (ref 45–65)
NITRITE UR QL STRIP: NEGATIVE
NON-SQ EPI CELLS URNS QL MICRO: ABNORMAL /HPF
PH UR STRIP.AUTO: 7 [PH]
PLATELET # BLD AUTO: 432 THOUSANDS/UL (ref 150–450)
PMV BLD AUTO: 7.5 FL (ref 8.9–12.7)
POTASSIUM SERPL-SCNC: 3.4 MMOL/L (ref 3.6–5)
PROT SERPL-MCNC: 8.1 G/DL (ref 5.9–8.4)
PROT UR STRIP-MCNC: ABNORMAL MG/DL
RBC # BLD AUTO: 4.75 MILLION/UL (ref 4–5.2)
RBC #/AREA URNS AUTO: ABNORMAL /HPF
SODIUM SERPL-SCNC: 135 MMOL/L (ref 137–147)
SP GR UR STRIP.AUTO: 1.01 (ref 1–1.04)
TROPONIN I SERPL-MCNC: <0.01 NG/ML (ref 0–0.03)
TROPONIN I SERPL-MCNC: <0.01 NG/ML (ref 0–0.03)
UROBILINOGEN UA: NEGATIVE MG/DL
WBC # BLD AUTO: 12.4 THOUSAND/UL (ref 4.5–11)
WBC #/AREA URNS AUTO: ABNORMAL /HPF

## 2021-02-02 PROCEDURE — 80053 COMPREHEN METABOLIC PANEL: CPT | Performed by: EMERGENCY MEDICINE

## 2021-02-02 PROCEDURE — 81025 URINE PREGNANCY TEST: CPT | Performed by: EMERGENCY MEDICINE

## 2021-02-02 PROCEDURE — 85025 COMPLETE CBC W/AUTO DIFF WBC: CPT | Performed by: EMERGENCY MEDICINE

## 2021-02-02 PROCEDURE — 96361 HYDRATE IV INFUSION ADD-ON: CPT

## 2021-02-02 PROCEDURE — G1004 CDSM NDSC: HCPCS

## 2021-02-02 PROCEDURE — 71045 X-RAY EXAM CHEST 1 VIEW: CPT

## 2021-02-02 PROCEDURE — 83735 ASSAY OF MAGNESIUM: CPT | Performed by: EMERGENCY MEDICINE

## 2021-02-02 PROCEDURE — 84484 ASSAY OF TROPONIN QUANT: CPT | Performed by: EMERGENCY MEDICINE

## 2021-02-02 PROCEDURE — 83690 ASSAY OF LIPASE: CPT | Performed by: EMERGENCY MEDICINE

## 2021-02-02 PROCEDURE — 99285 EMERGENCY DEPT VISIT HI MDM: CPT

## 2021-02-02 PROCEDURE — 36415 COLL VENOUS BLD VENIPUNCTURE: CPT | Performed by: EMERGENCY MEDICINE

## 2021-02-02 PROCEDURE — 70450 CT HEAD/BRAIN W/O DYE: CPT

## 2021-02-02 PROCEDURE — 81001 URINALYSIS AUTO W/SCOPE: CPT | Performed by: EMERGENCY MEDICINE

## 2021-02-02 PROCEDURE — 93005 ELECTROCARDIOGRAM TRACING: CPT

## 2021-02-02 PROCEDURE — 96375 TX/PRO/DX INJ NEW DRUG ADDON: CPT

## 2021-02-02 PROCEDURE — 99285 EMERGENCY DEPT VISIT HI MDM: CPT | Performed by: EMERGENCY MEDICINE

## 2021-02-02 PROCEDURE — 96365 THER/PROPH/DIAG IV INF INIT: CPT

## 2021-02-02 RX ORDER — DEXAMETHASONE SODIUM PHOSPHATE 4 MG/ML
10 INJECTION, SOLUTION INTRA-ARTICULAR; INTRALESIONAL; INTRAMUSCULAR; INTRAVENOUS; SOFT TISSUE ONCE
Status: COMPLETED | OUTPATIENT
Start: 2021-02-02 | End: 2021-02-02

## 2021-02-02 RX ORDER — MAGNESIUM SULFATE HEPTAHYDRATE 40 MG/ML
2 INJECTION, SOLUTION INTRAVENOUS ONCE
Status: COMPLETED | OUTPATIENT
Start: 2021-02-02 | End: 2021-02-02

## 2021-02-02 RX ORDER — KETOROLAC TROMETHAMINE 30 MG/ML
30 INJECTION, SOLUTION INTRAMUSCULAR; INTRAVENOUS ONCE
Status: COMPLETED | OUTPATIENT
Start: 2021-02-02 | End: 2021-02-02

## 2021-02-02 RX ORDER — POTASSIUM CHLORIDE 750 MG/1
20 TABLET, EXTENDED RELEASE ORAL ONCE
Status: COMPLETED | OUTPATIENT
Start: 2021-02-02 | End: 2021-02-02

## 2021-02-02 RX ORDER — SUCRALFATE ORAL 1 G/10ML
1 SUSPENSION ORAL
Qty: 200 ML | Refills: 0 | Status: SHIPPED | OUTPATIENT
Start: 2021-02-02 | End: 2021-11-23 | Stop reason: HOSPADM

## 2021-02-02 RX ORDER — MAGNESIUM HYDROXIDE/ALUMINUM HYDROXICE/SIMETHICONE 120; 1200; 1200 MG/30ML; MG/30ML; MG/30ML
30 SUSPENSION ORAL ONCE
Status: COMPLETED | OUTPATIENT
Start: 2021-02-02 | End: 2021-02-02

## 2021-02-02 RX ORDER — METOCLOPRAMIDE HYDROCHLORIDE 5 MG/ML
10 INJECTION INTRAMUSCULAR; INTRAVENOUS ONCE
Status: COMPLETED | OUTPATIENT
Start: 2021-02-02 | End: 2021-02-02

## 2021-02-02 RX ORDER — DIPHENHYDRAMINE HYDROCHLORIDE 50 MG/ML
25 INJECTION INTRAMUSCULAR; INTRAVENOUS ONCE
Status: COMPLETED | OUTPATIENT
Start: 2021-02-02 | End: 2021-02-02

## 2021-02-02 RX ORDER — SUCRALFATE 1 G/1
1 TABLET ORAL ONCE
Status: COMPLETED | OUTPATIENT
Start: 2021-02-02 | End: 2021-02-02

## 2021-02-02 RX ORDER — LIDOCAINE HYDROCHLORIDE 20 MG/ML
15 SOLUTION OROPHARYNGEAL ONCE
Status: COMPLETED | OUTPATIENT
Start: 2021-02-02 | End: 2021-02-02

## 2021-02-02 RX ORDER — BUTALBITAL, ACETAMINOPHEN AND CAFFEINE 50; 325; 40 MG/1; MG/1; MG/1
1 TABLET ORAL EVERY 6 HOURS PRN
Qty: 12 TABLET | Refills: 0 | Status: SHIPPED | OUTPATIENT
Start: 2021-02-02 | End: 2021-02-05

## 2021-02-02 RX ORDER — BUTALBITAL, ACETAMINOPHEN AND CAFFEINE 50; 325; 40 MG/1; MG/1; MG/1
1 TABLET ORAL ONCE
Status: COMPLETED | OUTPATIENT
Start: 2021-02-02 | End: 2021-02-02

## 2021-02-02 RX ADMIN — SODIUM CHLORIDE 1000 ML: 0.9 INJECTION, SOLUTION INTRAVENOUS at 19:58

## 2021-02-02 RX ADMIN — SUCRALFATE 1 G: 1 TABLET ORAL at 20:46

## 2021-02-02 RX ADMIN — KETOROLAC TROMETHAMINE 30 MG: 30 INJECTION, SOLUTION INTRAMUSCULAR; INTRAVENOUS at 20:46

## 2021-02-02 RX ADMIN — BUTALBITAL, ACETAMINOPHEN, AND CAFFEINE 1 TABLET: 50; 325; 40 TABLET ORAL at 21:31

## 2021-02-02 RX ADMIN — MAGNESIUM SULFATE IN WATER 2 G: 40 INJECTION, SOLUTION INTRAVENOUS at 20:00

## 2021-02-02 RX ADMIN — DEXAMETHASONE SODIUM PHOSPHATE 10 MG: 4 INJECTION, SOLUTION INTRAMUSCULAR; INTRAVENOUS at 21:29

## 2021-02-02 RX ADMIN — MAGNESIUM OXIDE TAB 400 MG (241.3 MG ELEMENTAL MG) 800 MG: 400 (241.3 MG) TAB at 22:37

## 2021-02-02 RX ADMIN — METOCLOPRAMIDE 10 MG: 5 INJECTION, SOLUTION INTRAMUSCULAR; INTRAVENOUS at 20:33

## 2021-02-02 RX ADMIN — POTASSIUM CHLORIDE 20 MEQ: 750 TABLET, EXTENDED RELEASE ORAL at 22:37

## 2021-02-02 RX ADMIN — ALUMINUM HYDROXIDE, MAGNESIUM HYDROXIDE, AND SIMETHICONE 30 ML: 200; 200; 20 SUSPENSION ORAL at 20:45

## 2021-02-02 RX ADMIN — LIDOCAINE HYDROCHLORIDE 15 ML: 20 SOLUTION ORAL; TOPICAL at 20:46

## 2021-02-02 RX ADMIN — DIPHENHYDRAMINE HYDROCHLORIDE 25 MG: 50 INJECTION INTRAMUSCULAR; INTRAVENOUS at 20:34

## 2021-02-03 LAB
ATRIAL RATE: 49 BPM
ATRIAL RATE: 54 BPM
ATRIAL RATE: 60 BPM
P AXIS: 40 DEGREES
P AXIS: 43 DEGREES
P AXIS: 44 DEGREES
PR INTERVAL: 138 MS
PR INTERVAL: 140 MS
PR INTERVAL: 140 MS
QRS AXIS: 22 DEGREES
QRS AXIS: 29 DEGREES
QRS AXIS: 31 DEGREES
QRSD INTERVAL: 92 MS
QRSD INTERVAL: 92 MS
QRSD INTERVAL: 96 MS
QT INTERVAL: 548 MS
QT INTERVAL: 556 MS
QT INTERVAL: 608 MS
QTC INTERVAL: 527 MS
QTC INTERVAL: 548 MS
QTC INTERVAL: 549 MS
T WAVE AXIS: 34 DEGREES
T WAVE AXIS: 39 DEGREES
T WAVE AXIS: 49 DEGREES
VENTRICULAR RATE: 49 BPM
VENTRICULAR RATE: 54 BPM
VENTRICULAR RATE: 60 BPM

## 2021-02-03 PROCEDURE — 93010 ELECTROCARDIOGRAM REPORT: CPT | Performed by: INTERNAL MEDICINE

## 2021-02-03 NOTE — ED PROVIDER NOTES
History  Chief Complaint   Patient presents with    Headache     x 2 weeks   Abdominal Pain     Hx of IBS and gastritis, N/V began last night after eating dinner  Patient with headache for two weeks and non traumatic  Patient with vomiting for one day  No blood  No fevers  No travel  No chest pain, sob  No MENCHACA  No LE edema  Headache is not worst of her life    Patient does have h/o CM in 1997 after pregnancy per documentation  History provided by:  Patient and EMS personnel   used: No    Headache  Pain location:  Generalized  Quality:  Unable to specify  Onset quality:  Gradual  Duration:  2 weeks  Timing:  Intermittent  Progression:  Waxing and waning  Chronicity:  Recurrent  Similar to prior headaches: yes    Context: not activity, not exposure to bright light, not caffeine, not coughing, not defecating, not eating, not stress, not exposure to cold air, not intercourse, not loud noise and not straining    Relieved by:  None tried  Worsened by:  Nothing  Ineffective treatments:  None tried  Associated symptoms: photophobia and vomiting    Associated symptoms: no abdominal pain, no back pain, no blurred vision, no congestion, no cough, no diarrhea, no dizziness, no drainage, no ear pain, no eye pain, no facial pain, no fatigue, no fever, no focal weakness, no hearing loss, no loss of balance, no myalgias, no nausea, no near-syncope, no neck pain, no neck stiffness, no numbness, no paresthesias, no seizures, no sinus pressure, no sore throat, no swollen glands, no syncope, no tingling, no URI, no visual change and no weakness    Risk factors: no anger, no family hx of SAH, does not have insomnia and lifestyle not sedentary        Prior to Admission Medications   Prescriptions Last Dose Informant Patient Reported? Taking?    LORazepam (ATIVAN) 1 mg tablet   No No   Sig: Take 1 tablet (1 mg total) by mouth 2 (two) times a day for 2 days   dicyclomine (BENTYL) 20 mg tablet   Yes No Sig: Take 20 mg by mouth 4 (four) times a day   famotidine (PEPCID) 40 MG tablet   No No   Sig: Take 1 tablet (40 mg total) by mouth daily at bedtime as needed for heartburn   ibuprofen (MOTRIN) 600 mg tablet   No No   Sig: Take 1 tablet (600 mg total) by mouth every 6 (six) hours as needed for mild pain   methocarbamol (ROBAXIN) 500 mg tablet   No No   Sig: Take 1 tablet (500 mg total) by mouth 3 (three) times a day as needed for muscle spasms   methocarbamol (ROBAXIN) 750 mg tablet   No No   Sig: Take 1 tablet (750 mg total) by mouth every 6 (six) hours as needed for muscle spasms   Patient not taking: Reported on 3/10/2020   metoclopramide (Reglan) 10 mg tablet   No No   Sig: Take 1 tablet (10 mg total) by mouth 4 (four) times a day As needed for nausea   mirtazapine (REMERON SOL-TAB) 30 mg dispersible tablet   Yes No   Sig: Take 30 mg by mouth   ondansetron (ZOFRAN-ODT) 4 mg disintegrating tablet   No No   Sig: Take 1 tablet (4 mg total) by mouth every 8 (eight) hours as needed for nausea or vomiting   oxyCODONE (ROXICODONE) 5 mg immediate release tablet   No No   Sig: Take 1 tablet (5 mg total) by mouth every 6 (six) hours as needed for severe painMax Daily Amount: 20 mg   Patient not taking: Reported on 2/18/2020   sucralfate (CARAFATE) 1 g/10 mL suspension   No No   Sig: Take 10 mL (1 g total) by mouth 4 (four) times a day   ziprasidone (GEODON) 80 mg capsule   Yes No   Sig: Take 80 mg by mouth      Facility-Administered Medications: None       Past Medical History:   Diagnosis Date    [facility]omyopathy ([facility] [address] )     Chest pain     Diarrhea     Elevated transaminase measurement     History of vitamin D deficiency     Irritable bowel syndrome (IBS)     with Constipation    Malaise and fatigue     Nutritional deficiency     Overactive thyroid gland     Pain in joint involving multiple sites     Palpitations     Sweating abnormality     Vaginal infection     Vitamin D insufficiency        Past Surgical History:   Procedure Laterality Date    ANKLE SURGERY Left     FINGER SURGERY      TUBAL LIGATION      UPPER GASTROINTESTINAL ENDOSCOPY         Family History   Problem Relation Age of Onset    No Known Problems Mother     Diabetes Father     Cancer Father      I have reviewed and agree with the history as documented  E-Cigarette/Vaping    E-Cigarette Use Never User      E-Cigarette/Vaping Substances     Social History     Tobacco Use    Smoking status: Current Every Day Smoker     Packs/day: 0 50    Smokeless tobacco: Never Used   Substance Use Topics    Alcohol use: Yes     Comment: occasionally    Drug use: Yes     Types: Marijuana     Comment: occasionally       Review of Systems   Constitutional: Negative for chills, fatigue and fever  HENT: Negative for congestion, ear pain, hearing loss, postnasal drip, sinus pressure and sore throat  Eyes: Positive for photophobia  Negative for blurred vision, pain and visual disturbance  Respiratory: Negative for cough and shortness of breath  Cardiovascular: Negative for chest pain, palpitations, syncope and near-syncope  Gastrointestinal: Positive for vomiting  Negative for abdominal pain, diarrhea and nausea  Genitourinary: Negative for dysuria and hematuria  Musculoskeletal: Negative for arthralgias, back pain, myalgias, neck pain and neck stiffness  Skin: Negative for color change and rash  Neurological: Positive for headaches  Negative for dizziness, focal weakness, seizures, syncope, weakness, numbness, paresthesias and loss of balance  All other systems reviewed and are negative  Physical Exam  Physical Exam  Vitals signs and nursing note reviewed  Constitutional:       General: She is not in acute distress  Appearance: She is well-developed  HENT:      Head: Normocephalic and atraumatic  Eyes:      Conjunctiva/sclera: Conjunctivae normal    Neck:      Musculoskeletal: Neck supple     Cardiovascular:      Rate and Rhythm: Normal rate and regular rhythm  Heart sounds: No murmur  Pulmonary:      Effort: Pulmonary effort is normal  No respiratory distress  Breath sounds: Normal breath sounds  Abdominal:      Palpations: Abdomen is soft  Tenderness: There is generalized abdominal tenderness  Skin:     General: Skin is warm and dry  Neurological:      General: No focal deficit present  Mental Status: She is alert  GCS: GCS eye subscore is 4  GCS verbal subscore is 5  GCS motor subscore is 6  Cranial Nerves: Cranial nerves are intact  Sensory: Sensation is intact  Motor: Motor function is intact  Coordination: Coordination is intact  Gait: Gait is intact           Vital Signs  ED Triage Vitals   Temperature Pulse Respirations Blood Pressure SpO2   02/02/21 1942 02/02/21 1942 02/02/21 1942 02/02/21 1942 02/02/21 1942   98 8 °F (37 1 °C) 70 20 125/73 98 %      Temp Source Heart Rate Source Patient Position - Orthostatic VS BP Location FiO2 (%)   02/02/21 1942 02/02/21 1942 02/02/21 2154 02/02/21 1942 --   Tympanic Monitor Lying Left arm       Pain Score       02/02/21 1940       Worst Possible Pain           Vitals:    02/02/21 1942 02/02/21 2154   BP: 125/73 104/53   Pulse: 70 55   Patient Position - Orthostatic VS:  Lying         Visual Acuity      ED Medications  Medications   sodium chloride 0 9 % bolus 1,000 mL (0 mL Intravenous Stopped 2/2/21 2313)   magnesium sulfate 2 g/50 mL IVPB (premix) 2 g (0 g Intravenous Stopped 2/2/21 2100)   metoclopramide (REGLAN) injection 10 mg (10 mg Intravenous Given 2/2/21 2033)   diphenhydrAMINE (BENADRYL) injection 25 mg (25 mg Intravenous Given 2/2/21 2034)   ketorolac (TORADOL) injection 30 mg (30 mg Intravenous Given 2/2/21 2046)   sucralfate (CARAFATE) tablet 1 g (1 g Oral Given 2/2/21 2046)   aluminum-magnesium hydroxide-simethicone (MYLANTA) oral suspension 30 mL (30 mL Oral Given 2/2/21 2045)   Lidocaine Viscous HCl (XYLOCAINE) 2 % mucosal solution 15 mL (15 mL Swish & Swallow Given 2/2/21 2046)   dexamethasone (DECADRON) injection 10 mg (10 mg Intravenous Given 2/2/21 2129)   butalbital-acetaminophen-caffeine (FIORICET,ESGIC) -40 mg per tablet 1 tablet (1 tablet Oral Given 2/2/21 2131)   magnesium oxide (MAG-OX) tablet 800 mg (800 mg Oral Given 2/2/21 2237)   potassium chloride (K-DUR,KLOR-CON) CR tablet 20 mEq (20 mEq Oral Given 2/2/21 2237)       Diagnostic Studies  Results Reviewed     Procedure Component Value Units Date/Time    Troponin I [[id number]]  (Normal) Collected: 02/02/21 2223    Lab Status: Final result Specimen: Blood from Arm, Right Updated: 02/02/21 2259     Troponin I <0 01 ng/mL     Troponin I [[id number]]  (Normal) Collected: 02/02/21 1957    Lab Status: Final result Specimen: Blood from Arm, Right Updated: 02/02/21 2024     Troponin I <0 01 ng/mL     Comprehensive metabolic panel [[id number]]  (Abnormal) Collected: 02/02/21 1957    Lab Status: Final result Specimen: Blood from Arm, Right Updated: 02/02/21 2015     Sodium 135 mmol/L      Potassium 3 4 mmol/L      Chloride 96 mmol/L      CO2 25 mmol/L      ANION GAP 14 mmol/L      BUN 10 mg/dL      Creatinine 0 71 mg/dL      Glucose 132 mg/dL      Calcium 9 8 mg/dL      AST 26 U/L      ALT 18 U/L      Alkaline Phosphatase 116 U/L      Total Protein 8 1 g/dL      Albumin 4 7 g/dL      Total Bilirubin 1 10 mg/dL      eGFR 105 ml/min/1 73sq m     Narrative:      Cesar guidelines for Chronic Kidney Disease (CKD):     Stage 1 with normal or high GFR (GFR > 90 mL/min/1 73 square meters)    Stage 2 Mild CKD (GFR = 60-89 mL/min/1 73 square meters)    Stage 3A Moderate CKD (GFR = 45-59 mL/min/1 73 square meters)    Stage 3B Moderate CKD (GFR = 30-44 mL/min/1 73 square meters)    Stage 4 Severe CKD (GFR = 15-29 mL/min/1 73 square meters)    Stage 5 End Stage CKD (GFR <15 mL/min/1 73 square meters)  Note: GFR calculation is accurate only with a steady state creatinine    Magnesium [[id number]]  (Normal) Collected: 02/02/21 1957    Lab Status: Final result Specimen: Blood from Arm, Right Updated: 02/02/21 2015     Magnesium 1 9 mg/dL     Lipase [[id number]]  (Normal) Collected: 02/02/21 1957    Lab Status: Final result Specimen: Blood from Arm, Right Updated: 02/02/21 2015     Lipase 232 u/L     Urine Microscopic [[id number]]  (Abnormal) Collected: 02/02/21 2000    Lab Status: Final result Specimen: Urine, Clean Catch Updated: 02/02/21 2012     RBC, UA 0-1 /hpf      WBC, UA 1-2 /hpf      Epithelial Cells Occasional /hpf      Bacteria, UA Occasional /hpf      MUCUS THREADS Occasional    UA (URINE) with reflex to Scope [[id number]]  (Abnormal) Collected: 02/02/21 2000    Lab Status: Final result Specimen: Urine, Clean Catch Updated: 02/02/21 2012     Color, UA Yellow     Clarity, UA Clear     Specific Gravity, UA 1 015     pH, UA 7 0     Leukocytes, UA Negative     Nitrite, UA Negative     Protein, UA 30 (1+) mg/dl      Glucose, UA Negative mg/dl      Ketones, UA 50 (2+) mg/dl      Bilirubin, UA Negative     Blood, UA 10 0     UROBILINOGEN UA Negative mg/dL     CBC and differential [[id number]]  (Abnormal) Collected: 02/02/21 1957    Lab Status: Final result Specimen: Blood from Arm, Right Updated: 02/02/21 2008     WBC 12 40 Thousand/uL      RBC 4 75 Million/uL      Hemoglobin 15 0 g/dL      Hematocrit 45 2 %      MCV 95 fL      MCH 31 5 pg      MCHC 33 2 g/dL      RDW 14 1 %      MPV 7 5 fL      Platelets 993 Thousands/uL      Neutrophils Relative 79 %      Lymphocytes Relative 13 %      Monocytes Relative 8 %      Eosinophils Relative 0 %      Basophils Relative 1 %      Neutrophils Absolute 9 70 Thousands/µL      Lymphocytes Absolute 1 70 Thousands/µL      Monocytes Absolute 0 90 Thousand/µL      Eosinophils Absolute 0 00 Thousand/µL      Basophils Absolute 0 10 Thousands/µL     POCT pregnancy, urine [[id number]]  (Normal) Resulted: 02/02/21 1957    Lab Status: Final result Updated: 02/02/21 1957     EXT PREG TEST UR (Ref: Negative) negative     Control valid                 XR chest 1 view portable   ED Interpretation by Rose Mary Harden DO (02/02 2034)   No acute pathology       Final Result by Leslie Mcclure MD (02/02 2044)      No acute cardiopulmonary disease  Findings are concordant with preliminary interpretation provided in the emergency department  Workstation performed: [id number]         CT head without contrast   Final Result by Julia Deal DO (02/02 2034)      No acute intracranial abnormality  Workstation performed: [id number]                    Procedures  Procedures         ED Course  ED Course as of Feb 02 2317   Tue Feb 02, 2021 2018 Patient is a 68-year-old female coming in today with persistent vomiting and headache  On exam she is neuro intact with no focal deficits in no acute distress  Will obtain basic labs, CT head, chest x-ray as well as IV fluids    Portions of the record may have been created with voice recognition software  Occasional wrong word or "sound a like" substitutions may have occurred due to the inherent limitations of voice recognition software  Read the chart carefully and recognize, using context, where substitutions have occurred  2031 Patient without acute changes on labs thus far  2034 Labs w/o acute change  Non meningitic   Reglan/benadryl given  Pending CTH and will give toradol as well as PO      2108 Patient updated on labs and urine as well as CT  Patient feeling better  Neuro intact  Will give decadron and firocet  2129 Discussed with Dr Jose Luis Jose - wishes for second troponin, if no CHF/Anginal equivalent  Patient and family updated regarding repeat trop and plan for follow up as outpatient  2232 Qtc still prolonged  Will give mag and potassium  If trop negative, will trial po and ambulation  2300 Two troponin's are negative          [address] [facility] Long discussion with patient and family  Offered admit but wishes for d rupa Hernandez d/w them regarding prolonged qtc and RTED instructions  HEART Risk Score      Most Recent Value   Heart Score Risk Calculator   History  0 Filed at: 02/02/2021 2028   ECG  1 Filed at: 02/02/2021 2028   Age  0 Filed at: 02/02/2021 2028   Risk Factors  --   Troponin  --   HEART Score  --                      SBIRT 22yo+      Most Recent Value   SBIRT (23 yo +)   In order to provide better care to our patients, we are screening all of our patients for alcohol and drug use  Would it be okay to ask you these screening questions? Yes Filed at: 02/02/2021 1958   Initial Alcohol Screen: US AUDIT-C    1  How often do you have a drink containing alcohol? 2 Filed at: 02/02/2021 1958   2  How many drinks containing alcohol do you have on a typical day you are drinking? 1 Filed at: 02/02/2021 1958   3b  FEMALE Any Age, or MALE 65+: How often do you have 4 or more drinks on one occassion? 0 Filed at: 02/02/2021 1958   Audit-C Score  3 Filed at: 02/02/2021 1958   PRABHJOT: How many times in the past year have you    Used an illegal drug or used a prescription medication for non-medical reasons? Never Filed at: 02/02/2021 1958                    MDM  Number of Diagnoses or Management Options  Diagnosis management comments:     EKG INTERPRETATION @ 1946  RHYTHM:NSR @ 60 bpm  AXIS: normal axis   INTERVALS: WY @ 128 ms  QRS COMPLEX: QRS @ 92 ms   ST SEGMENT:  Nonspecific ST segment changes  Diffuse artifact  QT INTERVAL:  QTC measured at 458 milliseconds  COMPARED WITH PRIOR change compred to old 6/20  Now biphasic QRS in V2-V3 and prolonged QTC  Interpretation by Salvador Restrepo DO    EKG INTERPRETATION @ 2113  RHYTHM melissa at 50 bpm  AXIS: normal axis  INTERVALS: WY @ 140 ms  QRS COMPLEX: QRS @ 92 ms   ST SEGMENT: biphasic qrx   Diffuse artifact  QT INTERVAL: QTC @ 527 ms  COMPARED WITH PRIOR no acute change  Interpretation by Arielle Trujillo DO    EKG INTERPRETATION @ 2223  RHYTHM: sinus melissa at 50 bpm  AXIS: normal axis   INTERVALS: FL at 140 ms  QRS COMPLEX: qrs at 96 ms  ST SEGMENT: biphasic qrs  v2-v3  Diffuse artifact  QT INTERVAL: qtc @ 549 ms  COMPARED WITH PRIOR  No change from prior   Interpretation by Arielle Trujillo DO           Amount and/or Complexity of Data Reviewed  Clinical lab tests: ordered and reviewed  Tests in the radiology section of CPT®: reviewed and ordered  Tests in the medicine section of CPT®: ordered and reviewed  Independent visualization of images, tracings, or specimens: yes        Disposition  Final diagnoses:   Headache   Nausea & vomiting   Prolonged Q-T interval on ECG     Time reflects when diagnosis was documented in both MDM as applicable and the Disposition within this note     Time User Action Codes Description Comment    2/2/2021 10:27 PM Mica Quinn Add [R51 9] Headache     2/2/2021 10:27 PM Miguel Walker Add [R11 2] Nausea & vomiting     2/2/2021 11:04 PM Bendock, Danney Klinefelter Add [R94 31] Prolonged Q-T interval on ECG       ED Disposition     ED Disposition Condition Date/Time Comment    Discharge Stable Tu Feb 2, 2021 11:04 PM Rodo Sierra discharge to home/self care              Follow-up Information     Follow up With Specialties Details Why Contact Info Additional [address]  Internal Medicine Schedule an appointment as soon as possible for a visit in 3 days  [address] [facility] Schedule an appointment as soon as possible for a visit in 1 week  [address], [address] [facility] [address]  [address], [address], [address] [place], [place], [address]          Patient's Medications   Discharge Prescriptions    BUTALBITAL-ACETAMINOPHEN-CAFFEINE (FIORICET,ESGIC) -40 MG PER TABLET    Take 1 tablet by mouth every 6 (six) hours as needed for headaches for up to 3 days       Start Date: 2/2/2021  End Date: 2/5/2021       Order Dose: 1 tablet       Quantity: 12 tablet    Refills: 0    SUCRALFATE (CARAFATE) 1 G/10 ML SUSPENSION    Take 10 mL (1 g total) by mouth 4 (four) times a day (with meals and at bedtime) for 5 days       Start Date: 2/2/2021  End Date: 2/7/2021       Order Dose: 1 g       Quantity: 200 mL    Refills: 0         PDMP Review     None          ED Provider  Electronically Signed by           [employer], DO  02/02/21 0123

## 2021-02-03 NOTE — DISCHARGE INSTRUCTIONS
YOU MUST FOLLOW UP WITH CARDIOLOGY  IF YOU FEEL ANY DIZZINESS, FEEL LIKE PASSING OUT, OR ANY CONCERN CALL 911      B R A T  DIET Your doctor has recommended the B R A T  diet for you or your child until his or her condition improves  This is often used to help control diarrhea and vomiting symptoms  If you or your child can tolerate clear liquids, you may offer: · Bananas · Rice · Applesauce · Toast (and other simple starches such as crackers, potatoes, noodles) Be sure to avoid dairy products, meats, and fatty foods until your child's symptoms are completely better

## 2021-02-04 ENCOUNTER — TELEPHONE (OUTPATIENT)
Dept: CARDIOLOGY CLINIC | Facility: CLINIC | Age: 46
End: 2021-02-04

## 2021-02-04 ENCOUNTER — APPOINTMENT (EMERGENCY)
Dept: CT IMAGING | Facility: HOSPITAL | Age: 46
End: 2021-02-04
Payer: COMMERCIAL

## 2021-02-04 ENCOUNTER — HOSPITAL ENCOUNTER (EMERGENCY)
Facility: HOSPITAL | Age: 46
Discharge: HOME | End: 2021-02-04
Attending: EMERGENCY MEDICINE
Payer: COMMERCIAL

## 2021-02-04 VITALS
OXYGEN SATURATION: 100 % | HEART RATE: 68 BPM | DIASTOLIC BLOOD PRESSURE: 102 MMHG | SYSTOLIC BLOOD PRESSURE: 152 MMHG | RESPIRATION RATE: 15 BRPM | BODY MASS INDEX: 22.15 KG/M2 | TEMPERATURE: 98.8 F | WEIGHT: 150 LBS

## 2021-02-04 DIAGNOSIS — G89.29 CHRONIC ABDOMINAL PAIN: Primary | ICD-10-CM

## 2021-02-04 DIAGNOSIS — R10.9 CHRONIC ABDOMINAL PAIN: Primary | ICD-10-CM

## 2021-02-04 LAB
ALBUMIN SERPL BCP-MCNC: 4.7 G/DL (ref 3–5.2)
ALP SERPL-CCNC: 132 U/L (ref 43–122)
ALT SERPL W P-5'-P-CCNC: 79 U/L (ref 9–52)
ANION GAP SERPL CALCULATED.3IONS-SCNC: 10 MMOL/L (ref 5–14)
AST SERPL W P-5'-P-CCNC: 82 U/L (ref 14–36)
BASOPHILS # BLD AUTO: 0.3 THOUSANDS/ΜL (ref 0–0.1)
BASOPHILS NFR BLD AUTO: 2 % (ref 0–1)
BILIRUB SERPL-MCNC: 1.1 MG/DL
BUN SERPL-MCNC: 12 MG/DL (ref 5–25)
CALCIUM SERPL-MCNC: 9.5 MG/DL (ref 8.4–10.2)
CHLORIDE SERPL-SCNC: 98 MMOL/L (ref 97–108)
CO2 SERPL-SCNC: 27 MMOL/L (ref 22–30)
CREAT SERPL-MCNC: 0.77 MG/DL (ref 0.6–1.2)
EOSINOPHIL # BLD AUTO: 0 THOUSAND/ΜL (ref 0–0.4)
EOSINOPHIL NFR BLD AUTO: 0 % (ref 0–6)
ERYTHROCYTE [DISTWIDTH] IN BLOOD BY AUTOMATED COUNT: 13.8 %
EXT PREG TEST URINE: NEGATIVE
EXT. CONTROL ED NAV: NORMAL
GFR SERPL CREATININE-BSD FRML MDRD: 95 ML/MIN/1.73SQ M
GLUCOSE SERPL-MCNC: 126 MG/DL (ref 70–99)
HCT VFR BLD AUTO: 49.4 % (ref 36–46)
HGB BLD-MCNC: 16.2 G/DL (ref 12–16)
LIPASE SERPL-CCNC: 385 U/L (ref 23–300)
LYMPHOCYTES # BLD AUTO: 1.4 THOUSANDS/ΜL (ref 0.5–4)
LYMPHOCYTES NFR BLD AUTO: 9 % (ref 25–45)
MCH RBC QN AUTO: 31.9 PG (ref 26–34)
MCHC RBC AUTO-ENTMCNC: 32.9 G/DL (ref 31–36)
MCV RBC AUTO: 97 FL (ref 80–100)
MONOCYTES # BLD AUTO: 0.5 THOUSAND/ΜL (ref 0.2–0.9)
MONOCYTES NFR BLD AUTO: 3 % (ref 1–10)
NEUTROPHILS # BLD AUTO: 12.6 THOUSANDS/ΜL (ref 1.8–7.8)
NEUTS SEG NFR BLD AUTO: 85 % (ref 45–65)
PLATELET # BLD AUTO: 384 THOUSANDS/UL (ref 150–450)
PMV BLD AUTO: 7.4 FL (ref 8.9–12.7)
POTASSIUM SERPL-SCNC: 3.1 MMOL/L (ref 3.6–5)
PROT SERPL-MCNC: 8.5 G/DL (ref 5.9–8.4)
RBC # BLD AUTO: 5.09 MILLION/UL (ref 4–5.2)
SODIUM SERPL-SCNC: 135 MMOL/L (ref 137–147)
WBC # BLD AUTO: 14.9 THOUSAND/UL (ref 4.5–11)

## 2021-02-04 PROCEDURE — 74177 CT ABD & PELVIS W/CONTRAST: CPT

## 2021-02-04 PROCEDURE — 96374 THER/PROPH/DIAG INJ IV PUSH: CPT

## 2021-02-04 PROCEDURE — 36415 COLL VENOUS BLD VENIPUNCTURE: CPT | Performed by: PHYSICIAN ASSISTANT

## 2021-02-04 PROCEDURE — 99284 EMERGENCY DEPT VISIT MOD MDM: CPT

## 2021-02-04 PROCEDURE — 85025 COMPLETE CBC W/AUTO DIFF WBC: CPT | Performed by: PHYSICIAN ASSISTANT

## 2021-02-04 PROCEDURE — 81025 URINE PREGNANCY TEST: CPT | Performed by: PHYSICIAN ASSISTANT

## 2021-02-04 PROCEDURE — 83690 ASSAY OF LIPASE: CPT | Performed by: PHYSICIAN ASSISTANT

## 2021-02-04 PROCEDURE — 80053 COMPREHEN METABOLIC PANEL: CPT | Performed by: PHYSICIAN ASSISTANT

## 2021-02-04 PROCEDURE — 96375 TX/PRO/DX INJ NEW DRUG ADDON: CPT

## 2021-02-04 PROCEDURE — 99284 EMERGENCY DEPT VISIT MOD MDM: CPT | Performed by: PHYSICIAN ASSISTANT

## 2021-02-04 PROCEDURE — 96361 HYDRATE IV INFUSION ADD-ON: CPT

## 2021-02-04 RX ORDER — METOCLOPRAMIDE HYDROCHLORIDE 5 MG/ML
10 INJECTION INTRAMUSCULAR; INTRAVENOUS ONCE
Status: COMPLETED | OUTPATIENT
Start: 2021-02-04 | End: 2021-02-04

## 2021-02-04 RX ORDER — ALUMINA, MAGNESIA, AND SIMETHICONE 2400; 2400; 240 MG/30ML; MG/30ML; MG/30ML
10 SUSPENSION ORAL EVERY 6 HOURS PRN
Qty: 355 ML | Refills: 0 | Status: SHIPPED | OUTPATIENT
Start: 2021-02-04

## 2021-02-04 RX ORDER — DIPHENHYDRAMINE HYDROCHLORIDE 50 MG/ML
25 INJECTION INTRAMUSCULAR; INTRAVENOUS ONCE
Status: DISCONTINUED | OUTPATIENT
Start: 2021-02-04 | End: 2021-02-04

## 2021-02-04 RX ORDER — MAGNESIUM HYDROXIDE/ALUMINUM HYDROXICE/SIMETHICONE 120; 1200; 1200 MG/30ML; MG/30ML; MG/30ML
30 SUSPENSION ORAL ONCE
Status: COMPLETED | OUTPATIENT
Start: 2021-02-04 | End: 2021-02-04

## 2021-02-04 RX ORDER — METOCLOPRAMIDE 10 MG/1
10 TABLET ORAL EVERY 6 HOURS
Qty: 30 TABLET | Refills: 0 | Status: SHIPPED | OUTPATIENT
Start: 2021-02-04 | End: 2021-11-22

## 2021-02-04 RX ORDER — LIDOCAINE HYDROCHLORIDE 20 MG/ML
15 SOLUTION OROPHARYNGEAL ONCE
Status: COMPLETED | OUTPATIENT
Start: 2021-02-04 | End: 2021-02-04

## 2021-02-04 RX ORDER — KETOROLAC TROMETHAMINE 30 MG/ML
15 INJECTION, SOLUTION INTRAMUSCULAR; INTRAVENOUS ONCE
Status: COMPLETED | OUTPATIENT
Start: 2021-02-04 | End: 2021-02-04

## 2021-02-04 RX ORDER — PANTOPRAZOLE SODIUM 40 MG/1
40 INJECTION, POWDER, FOR SOLUTION INTRAVENOUS ONCE
Status: DISCONTINUED | OUTPATIENT
Start: 2021-02-04 | End: 2021-02-04

## 2021-02-04 RX ORDER — KETOROLAC TROMETHAMINE 30 MG/ML
15 INJECTION, SOLUTION INTRAMUSCULAR; INTRAVENOUS ONCE
Status: DISCONTINUED | OUTPATIENT
Start: 2021-02-04 | End: 2021-02-04

## 2021-02-04 RX ADMIN — KETOROLAC TROMETHAMINE 15 MG: 30 INJECTION, SOLUTION INTRAMUSCULAR; INTRAVENOUS at 17:17

## 2021-02-04 RX ADMIN — ALUMINUM HYDROXIDE, MAGNESIUM HYDROXIDE, AND SIMETHICONE 30 ML: 200; 200; 20 SUSPENSION ORAL at 18:00

## 2021-02-04 RX ADMIN — LIDOCAINE HYDROCHLORIDE 15 ML: 20 SOLUTION ORAL; TOPICAL at 18:00

## 2021-02-04 RX ADMIN — SODIUM CHLORIDE 1000 ML: 0.9 INJECTION, SOLUTION INTRAVENOUS at 17:12

## 2021-02-04 RX ADMIN — METOCLOPRAMIDE 10 MG: 5 INJECTION, SOLUTION INTRAMUSCULAR; INTRAVENOUS at 17:12

## 2021-02-04 RX ADMIN — IOHEXOL 100 ML: 350 INJECTION, SOLUTION INTRAVENOUS at 17:52

## 2021-02-04 NOTE — TELEPHONE ENCOUNTER
----- Message from Georgia Lee MD sent at 2/2/2021  9:32 PM EST -----  This patrient was in ER today and needs cardiology appointment for history of cardiomyopathy and abnormal ECG  I can see her as add on on Friday if possible  Thanks      Ather

## 2021-02-04 NOTE — ED NOTES
Pt stated "Why do you guys keep giving me this stuff, it doesn't work" when this RN explained the medications that were ordered  Pt was encouraged to keep this RN updated with her pain management after administration of medications        Aldo Huerta RN  02/04/21 5731

## 2021-02-04 NOTE — ED NOTES
Upon exiting the unit, the pt attempted to take linens with her and refused to take her discharge paperwork        Thomas Alejandro RN  02/04/21 6227

## 2021-02-04 NOTE — ED PROVIDER NOTES
History  Chief Complaint   Patient presents with    Abdominal Pain     chronic generalized abd pain     Patient is a 39year old female reports a chronic history of abdominal pain which has been ongoing since she was 25 patient reports she has had endoscopy and colonoscopy in follows with GI patient reports she continues to have abdominal pain episodes that occur on a regular basis patient denies any inciting factor patient does report she smokes marijuana  Patient denies any abdominal surgeries and reports she has not taken any medications to alleviate her abdominal pain despite being prescribed Bentyl, Pepcid, Zofran, Reglan, Carafate  Patient sources nausea and vomiting denies any blood noted in either and reports no constipation or diarrhea  Patient reports she typically gets a cocktail of fentanyl and morphine  Patient denies any chest pain, palpitations, shortness of breath, coughing, fevers, chills or COVID-19 exposures  History provided by:  Patient  Abdominal Pain  Pain location:  Generalized  Pain quality: cramping    Pain radiates to:  Does not radiate  Pain severity:  Moderate  Onset quality:  Gradual  Duration:  1 day  Timing:  Constant  Progression:  Unchanged  Chronicity:  New  Relieved by:  None tried  Worsened by:  Nothing  Ineffective treatments:  None tried  Associated symptoms: nausea and vomiting    Associated symptoms: no chest pain, no chills, no dysuria, no fatigue, no fever, no hematuria, no shortness of breath and no sore throat        Prior to Admission Medications   Prescriptions Last Dose Informant Patient Reported? Taking?    LORazepam (ATIVAN) 1 mg tablet   No No   Sig: Take 1 tablet (1 mg total) by mouth 2 (two) times a day for 2 days   butalbital-acetaminophen-caffeine (FIORICET,ESGIC) -40 mg per tablet   No No   Sig: Take 1 tablet by mouth every 6 (six) hours as needed for headaches for up to 3 days   dicyclomine (BENTYL) 20 mg tablet   Yes No   Sig: Take 20 mg by mouth 4 (four) times a day   famotidine (PEPCID) 40 MG tablet   No No   Sig: Take 1 tablet (40 mg total) by mouth daily at bedtime as needed for heartburn   ibuprofen (MOTRIN) 600 mg tablet   No No   Sig: Take 1 tablet (600 mg total) by mouth every 6 (six) hours as needed for mild pain   methocarbamol (ROBAXIN) 500 mg tablet   No No   Sig: Take 1 tablet (500 mg total) by mouth 3 (three) times a day as needed for muscle spasms   methocarbamol (ROBAXIN) 750 mg tablet   No No   Sig: Take 1 tablet (750 mg total) by mouth every 6 (six) hours as needed for muscle spasms   Patient not taking: Reported on 3/10/2020   metoclopramide (Reglan) 10 mg tablet   No No   Sig: Take 1 tablet (10 mg total) by mouth 4 (four) times a day As needed for nausea   mirtazapine (REMERON SOL-TAB) 30 mg dispersible tablet   Yes No   Sig: Take 30 mg by mouth   ondansetron (ZOFRAN-ODT) 4 mg disintegrating tablet   No No   Sig: Take 1 tablet (4 mg total) by mouth every 8 (eight) hours as needed for nausea or vomiting   oxyCODONE (ROXICODONE) 5 mg immediate release tablet   No No   Sig: Take 1 tablet (5 mg total) by mouth every 6 (six) hours as needed for severe painMax Daily Amount: 20 mg   Patient not taking: Reported on 2/18/2020   sucralfate (CARAFATE) 1 g/10 mL suspension   No No   Sig: Take 10 mL (1 g total) by mouth 4 (four) times a day   sucralfate (CARAFATE) 1 g/10 mL suspension   No No   Sig: Take 10 mL (1 g total) by mouth 4 (four) times a day (with meals and at bedtime) for 5 days   ziprasidone (GEODON) 80 mg capsule   Yes No   Sig: Take 80 mg by mouth      Facility-Administered Medications: None       Past Medical History:   Diagnosis Date    Cardiomyopathy ([address] )     Elevated transaminase measurement     Irritable bowel syndrome (IBS)     with Constipation    Nutritional deficiency     Overactive thyroid gland     Palpitations     Sweating abnormality     Vaginal infection     Vitamin D insufficiency        Past Surgical History:   Procedure Laterality Date    ANKLE SURGERY Left     FINGER SURGERY      TUBAL LIGATION      UPPER GASTROINTESTINAL ENDOSCOPY         Family History   Problem Relation Age of Onset    No Known Problems Mother     Diabetes Father     Cancer Father      I have reviewed and agree with the history as documented  E-Cigarette/Vaping    E-Cigarette Use Never User      E-Cigarette/Vaping Substances     Social History     Tobacco Use    Smoking status: Current Every Day Smoker     Packs/day: 0 50     Types: Cigarettes    Smokeless tobacco: Never Used   Substance Use Topics    Alcohol use: Yes     Frequency: Monthly or less     Drinks per session: 3 or 4     Comment: occasionally    Drug use: Yes     Types: Marijuana     Comment: occasionally       Review of Systems   Constitutional: Negative for chills, fatigue and fever  HENT: Negative for congestion, ear pain, rhinorrhea and sore throat  Eyes: Negative for redness  Respiratory: Negative for chest tightness and shortness of breath  Cardiovascular: Negative for chest pain and palpitations  Gastrointestinal: Positive for abdominal pain, nausea and vomiting  Genitourinary: Negative for dysuria and hematuria  Musculoskeletal: Negative  Skin: Negative for rash  Neurological: Negative for dizziness, syncope, light-headedness and numbness  Physical Exam  Physical Exam  Vitals signs and nursing note reviewed  Constitutional:       Appearance: She is well-developed  HENT:      Head: Normocephalic  Eyes:      General: No scleral icterus  Cardiovascular:      Rate and Rhythm: Normal rate and regular rhythm  Pulmonary:      Effort: Pulmonary effort is normal       Breath sounds: Normal breath sounds  No stridor  Abdominal:      General: There is no distension  Palpations: Abdomen is soft  Tenderness: There is generalized abdominal tenderness  Musculoskeletal: Normal range of motion     Skin:     General: Skin is warm and dry  Capillary Refill: Capillary refill takes less than 2 seconds  Neurological:      Mental Status: She is alert and oriented to person, place, and time           Vital Signs  ED Triage Vitals [02/04/21 1551]   Temperature Pulse Respirations Blood Pressure SpO2   98 8 °F (37 1 °C) 68 15 (!) 152/102 100 %      Temp Source Heart Rate Source Patient Position - Orthostatic VS BP Location FiO2 (%)   Tympanic Monitor -- -- --      Pain Score       Worst Possible Pain           Vitals:    02/04/21 1551   BP: (!) 152/102   Pulse: 68         Visual Acuity      ED Medications  Medications   sodium chloride 0 9 % bolus 1,000 mL (0 mL Intravenous Stopped 2/4/21 1826)   metoclopramide (REGLAN) injection 10 mg (10 mg Intravenous Given 2/4/21 1712)   ketorolac (TORADOL) injection 15 mg (15 mg Intravenous Given 2/4/21 1717)   Lidocaine Viscous HCl (XYLOCAINE) 2 % mucosal solution 15 mL (15 mL Swish & Swallow Given 2/4/21 1800)   aluminum-magnesium hydroxide-simethicone (MYLANTA) oral suspension 30 mL (30 mL Oral Given 2/4/21 1800)   iohexol (OMNIPAQUE) 350 MG/ML injection (SINGLE-DOSE) 100 mL (100 mL Intravenous Given 2/4/21 1752)       Diagnostic Studies  Results Reviewed     Procedure Component Value Units Date/Time    Lipase [[id number]]  (Abnormal) Collected: 02/04/21 1659    Lab Status: Final result Specimen: Blood from Arm, Left Updated: 02/04/21 1721     Lipase 385 u/L     Comprehensive metabolic panel [[id number]]  (Abnormal) Collected: 02/04/21 1659    Lab Status: Final result Specimen: Blood from Arm, Left Updated: 02/04/21 1721     Sodium 135 mmol/L      Potassium 3 1 mmol/L      Chloride 98 mmol/L      CO2 27 mmol/L      ANION GAP 10 mmol/L      BUN 12 mg/dL      Creatinine 0 77 mg/dL      Glucose 126 mg/dL      Calcium 9 5 mg/dL      AST 82 U/L      ALT 79 U/L      Alkaline Phosphatase 132 U/L      Total Protein 8 5 g/dL      Albumin 4 7 g/dL      Total Bilirubin 1 10 mg/dL      eGFR 95 ml/min/1 73sq m Narrative:      National Kidney Disease Foundation guidelines for Chronic Kidney Disease (CKD):     Stage 1 with normal or high GFR (GFR > 90 mL/min/1 73 square meters)    Stage 2 Mild CKD (GFR = 60-89 mL/min/1 73 square meters)    Stage 3A Moderate CKD (GFR = 45-59 mL/min/1 73 square meters)    Stage 3B Moderate CKD (GFR = 30-44 mL/min/1 73 square meters)    Stage 4 Severe CKD (GFR = 15-29 mL/min/1 73 square meters)    Stage 5 End Stage CKD (GFR <15 mL/min/1 73 square meters)  Note: GFR calculation is accurate only with a steady state creatinine    CBC and differential [[id number]]  (Abnormal) Collected: 02/04/21 1659    Lab Status: Final result Specimen: Blood from Arm, Left Updated: 02/04/21 1713     WBC 14 90 Thousand/uL      RBC 5 09 Million/uL      Hemoglobin 16 2 g/dL      Hematocrit 49 4 %      MCV 97 fL      MCH 31 9 pg      MCHC 32 9 g/dL      RDW 13 8 %      MPV 7 4 fL      Platelets 451 Thousands/uL      Neutrophils Relative 85 %      Lymphocytes Relative 9 %      Monocytes Relative 3 %      Eosinophils Relative 0 %      Basophils Relative 2 %      Neutrophils Absolute 12 60 Thousands/µL      Lymphocytes Absolute 1 40 Thousands/µL      Monocytes Absolute 0 50 Thousand/µL      Eosinophils Absolute 0 00 Thousand/µL      Basophils Absolute 0 30 Thousands/µL     POCT pregnancy, urine [[id number]]  (Normal) Resulted: 02/04/21 1646    Lab Status: Final result Updated: 02/04/21 1647     EXT PREG TEST UR (Ref: Negative) negative     Control Valid                 CT abdomen pelvis w contrast   Final Result by Emani Wilks MD (02/04 1811)   1  Stable exam   No acute intra-abdominal pathology  Workstation performed: [id number]                    Procedures  Procedures         ED Course                             SBIRT 20yo+      Most Recent Value   SBIRT (23 yo +)   In order to provide better care to our patients, we are screening all of our patients for alcohol and drug use   Would it be okay to ask you these screening questions? No Filed at: 02/04/2021 1557                    MDM    Disposition  Final diagnoses:   Chronic abdominal pain     Time reflects when diagnosis was documented in both MDM as applicable and the Disposition within this note     Time User Action Codes Description Comment    2/4/2021  5:30 PM Tia Loretta Add [R10 9,  G89 29] Chronic abdominal pain       ED Disposition     ED Disposition Condition Date/Time Comment    Discharge Stable Thu Feb 4, 2021  5:30 PM Enzo Drought discharge to home/self care  Follow-up Information     Follow up With Specialties Details Why Contact Info    Afshan Reynaga MD Family Medicine Schedule an appointment as soon as possible for a visit   [address] [place] [address]  [address] Internal Medicine   Norberto Fontanajhonnyrezafederico [address] [place] [address]  [phone]            Discharge Medication List as of 2/4/2021  5:35 PM      START taking these medications    Details   aluminum-magnesium hydroxide-simethicone (MAALOX MAX) 400-400-40 MG/5ML suspension Take 10 mL by mouth every 6 (six) hours as needed for indigestion or heartburn (nausea), Starting Thu 2/4/2021, Normal      !! metoclopramide (REGLAN) 10 mg tablet Take 1 tablet (10 mg total) by mouth every 6 (six) hours, Starting Thu 2/4/2021, Normal       !! - Potential duplicate medications found  Please discuss with provider        CONTINUE these medications which have NOT CHANGED    Details   butalbital-acetaminophen-caffeine (FIORICET,ESGIC) -40 mg per tablet Take 1 tablet by mouth every 6 (six) hours as needed for headaches for up to 3 days, Starting Tue 2/2/2021, Until Fri 2/5/2021, Normal      dicyclomine (BENTYL) 20 mg tablet Take 20 mg by mouth 4 (four) times a day, Starting Thu 7/11/2019, Historical Med      famotidine (PEPCID) 40 MG tablet Take 1 tablet (40 mg total) by mouth daily at bedtime as needed for heartburn, Starting Fri 5/29/2020, Normal      ibuprofen (MOTRIN) 600 mg tablet Take 1 tablet (600 mg total) by mouth every 6 (six) hours as needed for mild pain, Starting Tue 2/18/2020, Normal      LORazepam (ATIVAN) 1 mg tablet Take 1 tablet (1 mg total) by mouth 2 (two) times a day for 2 days, Starting Sat 8/24/2019, Until Tue 2/4/2020, Print      !! methocarbamol (ROBAXIN) 500 mg tablet Take 1 tablet (500 mg total) by mouth 3 (three) times a day as needed for muscle spasms, Starting Tue 2/4/2020, Normal      !! methocarbamol (ROBAXIN) 750 mg tablet Take 1 tablet (750 mg total) by mouth every 6 (six) hours as needed for muscle spasms, Starting Tue 2/18/2020, Normal      !! metoclopramide (Reglan) 10 mg tablet Take 1 tablet (10 mg total) by mouth 4 (four) times a day As needed for nausea, Starting Fri 5/29/2020, Normal      mirtazapine (REMERON SOL-TAB) 30 mg dispersible tablet Take 30 mg by mouth, Starting Thu 8/8/2019, Historical Med      ondansetron (ZOFRAN-ODT) 4 mg disintegrating tablet Take 1 tablet (4 mg total) by mouth every 8 (eight) hours as needed for nausea or vomiting, Starting Fri 5/29/2020, Normal      oxyCODONE (ROXICODONE) 5 mg immediate release tablet Take 1 tablet (5 mg total) by mouth every 6 (six) hours as needed for severe painMax Daily Amount: 20 mg, Starting Tue 2/4/2020, Normal      !! sucralfate (CARAFATE) 1 g/10 mL suspension Take 10 mL (1 g total) by mouth 4 (four) times a day, Starting Thu 8/27/2020, Normal      !! sucralfate (CARAFATE) 1 g/10 mL suspension Take 10 mL (1 g total) by mouth 4 (four) times a day (with meals and at bedtime) for 5 days, Starting Tue 2/2/2021, Until Sun 2/7/2021, Normal      ziprasidone (GEODON) 80 mg capsule Take 80 mg by mouth, Starting Thu 8/8/2019, Historical Med       !! - Potential duplicate medications found  Please discuss with provider  No discharge procedures on file      PDMP Review     None          ED Provider  Electronically Signed by           Natalie Bach Allison Walsh PA-C  02/09/21 1259

## 2021-02-09 NOTE — ED ATTENDING ATTESTATION
I was the attending physician on duty at the time the patient visited the emergency department  The patient was evaluated and dispositioned by the APC  I was personally available for consultation  I am administratively signing the chart after the fact      Juli Judge MD

## 2021-02-25 ENCOUNTER — TELEPHONE (OUTPATIENT)
Dept: CARDIOLOGY CLINIC | Facility: CLINIC | Age: 46
End: 2021-02-25

## 2021-02-25 NOTE — TELEPHONE ENCOUNTER
Called pt she was no show at 1240 today left vm that due to schedule change that dr is not available for the 420today,Numerous calls made with messages to pt

## 2021-02-26 ENCOUNTER — OFFICE VISIT (OUTPATIENT)
Dept: CARDIOLOGY CLINIC | Facility: CLINIC | Age: 46
End: 2021-02-26
Payer: COMMERCIAL

## 2021-02-26 VITALS
HEART RATE: 80 BPM | WEIGHT: 163.8 LBS | BODY MASS INDEX: 24.19 KG/M2 | DIASTOLIC BLOOD PRESSURE: 78 MMHG | SYSTOLIC BLOOD PRESSURE: 100 MMHG | TEMPERATURE: 97.9 F

## 2021-02-26 DIAGNOSIS — Z86.79 HISTORY OF CARDIOMYOPATHY: ICD-10-CM

## 2021-02-26 DIAGNOSIS — R00.2 PALPITATIONS: Primary | ICD-10-CM

## 2021-02-26 DIAGNOSIS — R94.31 ABNORMAL ECG: ICD-10-CM

## 2021-02-26 DIAGNOSIS — R94.31 QT PROLONGATION: ICD-10-CM

## 2021-02-26 PROCEDURE — 93000 ELECTROCARDIOGRAM COMPLETE: CPT | Performed by: INTERNAL MEDICINE

## 2021-02-26 PROCEDURE — 99204 OFFICE O/P NEW MOD 45 MIN: CPT | Performed by: INTERNAL MEDICINE

## 2021-02-26 NOTE — PROGRESS NOTES
[facility]  [address] [employer], [place] [address]  Phone#  [phone]  Fax#  [phone]  *-*-*-*-*-*-*-*-*-*-*-*-*-*-*-*-*-*-*-*-*-*-*-*-*-*-*-*-*-*-*-*-*-*-*-*-*-*-*-*-*-*-*-*-*-*-*-*-*-*-*-*-*-*  Lana Toscano DATE: 02/26/21 7:21 PM  PATIENT NAME: Lisa Beltran   1975    [id number]  Age: 39 y o  Sex: female  AUTHOR: Helen Yuen MD  PRIMARYCARE PHYSICIAN: Ana Paula Carreon  REFERRING PHYSICIAN: Ana Paula Carreon  [facility],  [address] [place] [address] Sita Schneider MD   *-*-*-*-*-*-*-*-*-*-*-*-*-*-*-*-*-*-*-*-*-*-*-*-*-*-*-*-*-*-*-*-*-*-*-*-*-*-*-*-*-*-*-*-*-*-*-*-*-*-*-*-*-*-  REASON FOR REFERRAL:  Follow-up for history of cardiomyopathy  *-*-*-*-*-*-*-*-*-*-*-*-*-*-*-*-*-*-*-*-*-*-*-*-*-*-*-*-*-*-*-*-*-*-*-*-*-*-*-*-*-*-*-*-*-*-*-*-*-*-*-*-*-*-  CARDIOLOGY ASSESSMENT & PLAN:   Diagnosis ICD-10-CM Associated Orders   1  Palpitations  R00 2 Holter monitor - 48 hour     POCT ECG   2  Abnormal ECG  R94 31 Holter monitor - 48 hour     POCT ECG     Stress test only, exercise   3  History of cardiomyopathy  Z86 79    4  Acquired QT prolongation  R94 31        History of cardiomyopathy  Ms Lisa Beltran  Is a 39 year  Old female with history of unspecified cardiomyopathy in the past who his  Experiencing multiple complaints which are primarily GI related  She does have some chest pain also but this is atypical in description  Her physical examination is benign and she shows no signs of heart failure or has significant valvular heart disease  She has known ECG abnormality consisting of biphasic T-waves in anterior leads  She had stress testing performed in 2018 which did not identify significant perfusion abnormality  She has previously noted QT prolongation which is acquired and secondary to her use of anti psychotic medications including Geodon  QTC  On today's ECG is at acceptable      --   At this time I am arranging for 48 hour Holter monitor to see if she has dynamic T-wave abnormalities  If she does then will consider repeating exercise stress test with imaging modality  --   I am advising her to keep a diary of symptoms and bring  Record for review at next visit  --  I strongly urged her to quit smoking and  Alcohol as these are her single major risk factors  --   In future her physicians will have to  Be mindful during prescribing medications  and consider medications QT prolonging effects     All QT prolonging medications should be avoided  --   Regarding her abdominal symptoms and recently abnormal pancreatic enzymes during ER presentation I am advising her to reestablish follow-up with gastroenterologist as she recently missed an appointment  *-*-*-*-*-*-*-*-*-*-*-*-*-*-*-*-*-*-*-*-*-*-*-*-*-*-*-*-*-*-*-*-*-*-*-*-*-*-*-*-*-*-*-*-*-*-*-*-*-*-*-*-*-*-  CURRENT ECG:  Results for orders placed or performed in visit on 02/26/21   POCT ECG    Narrative     Sinus rhythm, normal axis and intervals, possible prior septal infarction, no significant ST T-wave abnormalities  No chamber hypertrophy or enlargement  HR 75 beats per minute  ECG during recent ER visit significant for marked sinus bradycardia, heart rate 49 beats per minute, biphasic T-waves in  Anteroseptal leads suggestive of possible anterior ischemia  Prolonged QT interval,  milliseconds  *-*-*-*-*-*-*-*-*-*-*-*-*-*-*-*-*-*-*-*-*-*-*-*-*-*-*-*-*-*-*-*-*-*-*-*-*-*-*-*-*-*-*-*-*-*-*-*-*-*-*-*-*-*-  HISTORY OF PRESENT ILLNESS:  Patient is a 68-year-old female with past medical history significant for:  1  History of unspecified cardiomyopathy with last reported EF of 50%  2  Irritable bowel syndrome  3  History of palpitations  4  Hyperthyroidism  5  PTSD, anxiety disorder and episodic mood disorder  6  Vitamin-D deficiency  7  Chronic abdominal pain     She was recently evaluated in the emergency room for abdominal pain which is chronic symptom for her    She was noted to have elevated lipase levels  During her ER visit she was noted to be bradycardic  Her ECG was abnormal with biphasic T-waves in anterior leads  She was treated with hydration and pain medications and was subsequently discharged  She was advised to follow-up with Cardiology regarding her ECG abnormalities  She has a remote history of unspecified cardiomyopathy  She reports that she used to follow at [facility] before but is unable to recall who her physician was  She denies having undergone any cardiac catheterizations in the past   She has no history MI or congestive heart failure  Review of her records indicates that she has a history of abnormal ECG with evidence of ectopic atrial rhythm in some of her ECGs  She also has QT prolongation and biphasic T-waves in her previous ECGs  From a symptom perspective she has no particular cardiac symptom but has several other complaints including abdominal pain, lower chest pain, nausea, headache  She also feels anxiety at times  Denies any orthopnea or PND  Though she is not exercise regularly, she is fairly active otherwise  She does report fatigue and intermittent dizziness  Denies any passing out or near passing out  She reports that her psychiatric disorder symptoms are well controlled and she follows with a psychiatrist on a regular basis  Functional capacity status:  Good   (Excellent- >10 METs; Good: (7-10 METs); Moderate (4-7 METs); Poor (<= 4 METs)    Any chronic stressors:  None   (feeling of poor health, financial problems, and social isolation etc)  Tobacco or alcohol dependence:  She is a chronic smoker smokes half a pack of cigarettes a day  She drinks beer and alcohol about 3 days a week  He smokes marijuana      *-*-*-*-*-*-*-*-*-*-*-*-*-*-*-*-*-*-*-*-*-*-*-*-*-*-*-*-*-*-*-*-*-*-*-*-*-*-*-*-*-*-*-*-*-*-*-*-*-*-*-*-*-*  PAST MEDICAL HISTORY:  Past Medical History:   Diagnosis Date    Cardiomyopathy ([facility] [address] )     Elevated transaminase measurement     Irritable bowel syndrome (IBS)     with Constipation    Nutritional deficiency     Overactive thyroid gland     Palpitations     Sweating abnormality     Vaginal infection     Vitamin D insufficiency     PAST SURGICAL HISTORY:   Past Surgical History:   Procedure Laterality Date    ANKLE SURGERY Left     FINGER SURGERY      TUBAL LIGATION      UPPER GASTROINTESTINAL ENDOSCOPY           FAMILY HISTORY:  Family History   Problem Relation Age of Onset    No Known Problems Mother     Diabetes Father     Cancer Father      Her dad had MI in his early 46s   SOCIAL HISTORY:  Social History     Tobacco Use   Smoking Status Current Every Day Smoker    Packs/day: 0 50    Types: Cigarettes   Smokeless Tobacco Never Used      Social History     Substance and Sexual Activity   Alcohol Use Yes    Frequency: Monthly or less    Drinks per session: 3 or 4    Comment: occasionally     Social History     Substance and Sexual Activity   Drug Use Yes    Types: Marijuana    Comment: occasionally      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     *-*-*-*-*-*-*-*-*-*-*-*-*-*-*-*-*-*-*-*-*-*-*-*-*-*-*-*-*-*-*-*-*-*-*-*-*-*-*-*-*-*-*-*-*-*-*-*-*-*-*-*-*-*  ALLERGIES:  Allergies   Allergen Reactions    Gabapentin Anaphylaxis    Hydrocodone Hives    Adhesive [Medical Tape] Rash    CURRENT SCHEDULED MEDICATIONS:    Current Outpatient Medications:     aluminum-magnesium hydroxide-simethicone (MAALOX MAX) 400-400-40 MG/5ML suspension, Take 10 mL by mouth every 6 (six) hours as needed for indigestion or heartburn (nausea), Disp: 355 mL, Rfl: 0    famotidine (PEPCID) 40 MG tablet, Take 1 tablet (40 mg total) by mouth daily at bedtime as needed for heartburn, Disp: 20 tablet, Rfl: 0    ibuprofen (MOTRIN) 600 mg tablet, Take 1 tablet (600 mg total) by mouth every 6 (six) hours as needed for mild pain, Disp: 40 tablet, Rfl: 0    metoclopramide (Reglan) 10 mg tablet, Take 1 tablet (10 mg total) by mouth 4 (four) times a day As needed for nausea, Disp: 15 tablet, Rfl: 0    ondansetron (ZOFRAN-ODT) 4 mg disintegrating tablet, Take 1 tablet (4 mg total) by mouth every 8 (eight) hours as needed for nausea or vomiting, Disp: 20 tablet, Rfl: 0    ziprasidone (GEODON) 80 mg capsule, Take 80 mg by mouth, Disp: , Rfl:     dicyclomine (BENTYL) 20 mg tablet, Take 20 mg by mouth 4 (four) times a day, Disp: , Rfl:     LORazepam (ATIVAN) 1 mg tablet, Take 1 tablet (1 mg total) by mouth 2 (two) times a day for 2 days (Patient not taking: Reported on 2/26/2021), Disp: 4 tablet, Rfl: 0    methocarbamol (ROBAXIN) 500 mg tablet, Take 1 tablet (500 mg total) by mouth 3 (three) times a day as needed for muscle spasms (Patient not taking: Reported on 2/26/2021), Disp: 30 tablet, Rfl: 0    methocarbamol (ROBAXIN) 750 mg tablet, Take 1 tablet (750 mg total) by mouth every 6 (six) hours as needed for muscle spasms (Patient not taking: Reported on 3/10/2020), Disp: 60 tablet, Rfl: 0    metoclopramide (REGLAN) 10 mg tablet, Take 1 tablet (10 mg total) by mouth every 6 (six) hours (Patient not taking: Reported on 2/26/2021), Disp: 30 tablet, Rfl: 0    mirtazapine (REMERON SOL-TAB) 30 mg dispersible tablet, Take 30 mg by mouth, Disp: , Rfl:     oxyCODONE (ROXICODONE) 5 mg immediate release tablet, Take 1 tablet (5 mg total) by mouth every 6 (six) hours as needed for severe painMax Daily Amount: 20 mg (Patient not taking: Reported on 2/18/2020), Disp: 10 tablet, Rfl: 0    sucralfate (CARAFATE) 1 g/10 mL suspension, Take 10 mL (1 g total) by mouth 4 (four) times a day (Patient not taking: Reported on 2/26/2021), Disp: 420 mL, Rfl: 0    sucralfate (CARAFATE) 1 g/10 mL suspension, Take 10 mL (1 g total) by mouth 4 (four) times a day (with meals and at bedtime) for 5 days (Patient not taking: Reported on 2/26/2021), Disp: 200 mL, Rfl: 0 *-*-*-*-*-*-*-*-*-*-*-*-*-*-*-*-*-*-*-*-*-*-*-*-*-*-*-*-*-*-*-*-*-*-*-*-*-*-*-*-*-*-*-*-*-*-*-*-*-*-*-*-*-*  REVIEW OF SYMPTOMS:    Positive for:  As described in HPI  Negative for: All remaining as reviewed below and in HPI   SYSTEM SYMPTOMS REVIEWED:  General--weight change, fever, night sweats  Respiratoryl-- Wheezing, shortness of breath, cough, URI symptoms, sputum, blood  Cardiovascular--chest pain, syncope, dyspnea on exertion, edema, decline in exercise tolerance, claudication   Gastrointestinal--persistent vomiting, diarrhea, abdominal distention, blood in stool   Muscular or skeletal--joint pain or swelling   Neurologic--headaches, syncope, abnormal movement  Hematologic--history of easy bruising and bleeding   Endocrine--thyroid enlargement, heat or cold intolerance, polyuria   Psychiatric--anxiety, depression      *-*-*-*-*-*-*-*-*-*-*-*-*-*-*-*-*-*-*-*-*-*-*-*-*-*-*-*-*-*-*-*-*-*-*-*-*-*-*-*-*-*-*-*-*-*-*-*-*-*-*-*-*-*  CURRENT OUTPATIENT MEDICATIONS:     Current Outpatient Medications:     aluminum-magnesium hydroxide-simethicone (MAALOX MAX) 400-400-40 MG/5ML suspension, Take 10 mL by mouth every 6 (six) hours as needed for indigestion or heartburn (nausea), Disp: 355 mL, Rfl: 0    famotidine (PEPCID) 40 MG tablet, Take 1 tablet (40 mg total) by mouth daily at bedtime as needed for heartburn, Disp: 20 tablet, Rfl: 0    ibuprofen (MOTRIN) 600 mg tablet, Take 1 tablet (600 mg total) by mouth every 6 (six) hours as needed for mild pain, Disp: 40 tablet, Rfl: 0    metoclopramide (Reglan) 10 mg tablet, Take 1 tablet (10 mg total) by mouth 4 (four) times a day As needed for nausea, Disp: 15 tablet, Rfl: 0    ondansetron (ZOFRAN-ODT) 4 mg disintegrating tablet, Take 1 tablet (4 mg total) by mouth every 8 (eight) hours as needed for nausea or vomiting, Disp: 20 tablet, Rfl: 0    ziprasidone (GEODON) 80 mg capsule, Take 80 mg by mouth, Disp: , Rfl:     dicyclomine (BENTYL) 20 mg tablet, Take 20 mg by mouth 4 (four) times a day, Disp: , Rfl:     LORazepam (ATIVAN) 1 mg tablet, Take 1 tablet (1 mg total) by mouth 2 (two) times a day for 2 days (Patient not taking: Reported on 2/26/2021), Disp: 4 tablet, Rfl: 0    methocarbamol (ROBAXIN) 500 mg tablet, Take 1 tablet (500 mg total) by mouth 3 (three) times a day as needed for muscle spasms (Patient not taking: Reported on 2/26/2021), Disp: 30 tablet, Rfl: 0    methocarbamol (ROBAXIN) 750 mg tablet, Take 1 tablet (750 mg total) by mouth every 6 (six) hours as needed for muscle spasms (Patient not taking: Reported on 3/10/2020), Disp: 60 tablet, Rfl: 0    metoclopramide (REGLAN) 10 mg tablet, Take 1 tablet (10 mg total) by mouth every 6 (six) hours (Patient not taking: Reported on 2/26/2021), Disp: 30 tablet, Rfl: 0    mirtazapine (REMERON SOL-TAB) 30 mg dispersible tablet, Take 30 mg by mouth, Disp: , Rfl:     oxyCODONE (ROXICODONE) 5 mg immediate release tablet, Take 1 tablet (5 mg total) by mouth every 6 (six) hours as needed for severe painMax Daily Amount: 20 mg (Patient not taking: Reported on 2/18/2020), Disp: 10 tablet, Rfl: 0    sucralfate (CARAFATE) 1 g/10 mL suspension, Take 10 mL (1 g total) by mouth 4 (four) times a day (Patient not taking: Reported on 2/26/2021), Disp: 420 mL, Rfl: 0    sucralfate (CARAFATE) 1 g/10 mL suspension, Take 10 mL (1 g total) by mouth 4 (four) times a day (with meals and at bedtime) for 5 days (Patient not taking: Reported on 2/26/2021), Disp: 200 mL, Rfl: 0    *-*-*-*-*-*-*-*-*-*-*-*-*-*-*-*-*-*-*-*-*-*-*-*-*-*-*-*-*-*-*-*-*-*-*-*-*-*-*-*-*-*-*-*-*-*-*-*-*-*-*-*-*-*  VITAL SIGNS:  Vitals:    02/26/21 1508   BP: 100/78   BP Location: Left arm   Patient Position: Sitting   Cuff Size: Adult   Pulse: 80   Temp: 97 9 °F (36 6 °C)   Weight: 74 3 kg (163 lb 12 8 oz)     Weight (last 2 days)     Date/Time   Weight    02/26/21 1508   74 3 (163 8)           ,   Wt Readings from Last 3 Encounters:   02/26/21 74 3 kg (163 lb 12 8 oz)   02/04/21 68 kg (150 lb)   08/27/20 75 9 kg (167 lb 5 3 oz)    , Body mass index is 24 19 kg/m²  *-*-*-*-*-*-*-*-*-*-*-*-*-*-*-*-*-*-*-*-*-*-*-*-*-*-*-*-*-*-*-*-*-*-*-*-*-*-*-*-*-*-*-*-*-*-*-*-*-*-*-*-*-*-  PHYSICAL EXAM:  General Appearance:    Alert, cooperative, no distress, appears stated age, tall normal built   Head, Eyes, ENT:    No obvious abnormality, moist mucous mebranes  Neck:   Supple, no carotid bruit or JVD   Back:     Symmetric, no curvature  Lungs:     Respirations unlabored  Clear to auscultation bilaterally,    Chest wall:    No tenderness or deformity   Heart:    Regular rate and rhythm, S1 and S2 normal, no murmur, rub  or gallop  Abdomen:     Soft, non-tender, No obvious masses, or organomegaly   Extremities:   Extremities warm, no cyanosis or edema    Skin:   Skin color, texture, turgor normal, no rashes or lesions     *-*-*-*-*-*-*-*-*-*-*-*-*-*-*-*-*-*-*-*-*-*-*-*-*-*-*-*-*-*-*-*-*-*-*-*-*-*-*-*-*-*-*-*-*-*-*-*-*-*-*-*-*-*-  LABORATORY DATA: I have personally reviewed the available laboratory data          Potassium   Date Value Ref Range Status   02/04/2021 3 1 (L) 3 6 - 5 0 mmol/L Final   02/02/2021 3 4 (L) 3 6 - 5 0 mmol/L Final   08/27/2020 3 3 (L) 3 6 - 5 0 mmol/L Final     Chloride   Date Value Ref Range Status   02/04/2021 98 97 - 108 mmol/L Final   02/02/2021 96 (L) 97 - 108 mmol/L Final   08/27/2020 101 97 - 108 mmol/L Final     CO2   Date Value Ref Range Status   02/04/2021 27 22 - 30 mmol/L Final   02/02/2021 25 22 - 30 mmol/L Final   08/27/2020 26 22 - 30 mmol/L Final     BUN   Date Value Ref Range Status   02/04/2021 12 5 - 25 mg/dL Final   02/02/2021 10 5 - 25 mg/dL Final   08/27/2020 7 5 - 25 mg/dL Final     Creatinine   Date Value Ref Range Status   02/04/2021 0 77 0 60 - 1 20 mg/dL Final     Comment:     Standardized to IDMS reference method   02/02/2021 0 71 0 60 - 1 20 mg/dL Final     Comment:     Standardized to IDMS reference method   08/27/2020 0 81 0 60 - 1 20 mg/dL Final     Comment:     Standardized to IDMS reference method     eGFR   Date Value Ref Range Status   02/04/2021 95 >60 ml/min/1 73sq m Final   02/02/2021 105 >60 ml/min/1 73sq m Final   08/27/2020 89 >60 ml/min/1 73sq m Final     Calcium   Date Value Ref Range Status   02/04/2021 9 5 8 4 - 10 2 mg/dL Final   02/02/2021 9 8 8 4 - 10 2 mg/dL Final   08/27/2020 9 3 8 4 - 10 2 mg/dL Final     AST   Date Value Ref Range Status   02/04/2021 82 (H) 14 - 36 U/L Final     Comment:     Specimen collection should occur prior to Sulfasalazine administration due to the potential for falsely depressed results  02/02/2021 26 14 - 36 U/L Final     Comment:     Specimen collection should occur prior to Sulfasalazine administration due to the potential for falsely depressed results  08/27/2020 26 14 - 36 U/L Final     Comment:     Specimen collection should occur prior to Sulfasalazine administration due to the potential for falsely depressed results  ALT   Date Value Ref Range Status   02/04/2021 79 (H) 9 - 52 U/L Final     Comment:     Specimen collection should occur prior to Sulfasalazine administration due to the potential for falsely depressed results  02/02/2021 18 9 - 52 U/L Final     Comment:     Specimen collection should occur prior to Sulfasalazine administration due to the potential for falsely depressed results  08/27/2020 22 9 - 52 U/L Final     Comment:     Specimen collection should occur prior to Sulfasalazine administration due to the potential for falsely depressed results        Alkaline Phosphatase   Date Value Ref Range Status   02/04/2021 132 (H) 43 - 122 U/L Final   02/02/2021 116 43 - 122 U/L Final   08/27/2020 131 (H) 43 - 122 U/L Final     Magnesium   Date Value Ref Range Status   02/02/2021 1 9 1 6 - 2 3 mg/dL Final   08/24/2019 2 0 1 6 - 2 3 mg/dL Final     WBC   Date Value Ref Range Status   02/04/2021 14 90 (H) 4 50 - 11 00 Thousand/uL Final   02/02/2021 12 40 (H) 4 50 - 11 00 Thousand/uL Final   08/27/2020 15 40 (H) 4 50 - 11 00 Thousand/uL Final     Hemoglobin   Date Value Ref Range Status   02/04/2021 16 2 (H) 12 0 - 16 0 g/dL Final   02/02/2021 15 0 12 0 - 16 0 g/dL Final   08/27/2020 14 8 12 0 - 16 0 g/dL Final     Platelets   Date Value Ref Range Status   02/04/2021 384 150 - 450 Thousands/uL Final   02/02/2021 432 150 - 450 Thousands/uL Final   08/27/2020 518 (H) 150 - 450 Thousands/uL Final     PTT   Date Value Ref Range Status   06/13/2019 27 23 - 34 seconds Final     Comment:     Therapeutic Heparin Range =  52-80 seconds     INR   Date Value Ref Range Status   06/13/2019 0 96 0 89 - 1 10 Final     No results found for: CKMB, DIGOXIN  No results found for: TSH  No results found for: CHOL, HDL, LDL, TRIG   Hemoglobin A1C   Date Value Ref Range Status   09/30/2019 5 8 (H) <5 7 % Final     Comment:     Reference Range  Non-diabetic                     <5 7  Pre-diabetic                     5 7-6 4  Diabetic                         >=6 5  ADA target for diabetic control  <=7     No results found for: BLOODCX, SPUTUMCULTUR, GRAMSTAIN, URINECX, WOUNDCULT, BODYFLUIDCUL, MRSACULTURE, INFLUAPCR, INFLUBPCR, RSVPCR, LEGIONELLAUR, CDIFFTOXINB    *-*-*-*-*-*-*-*-*-*-*-*-*-*-*-*-*-*-*-*-*-*-*-*-*-*-*-*-*-*-*-*-*-*-*-*-*-*-*-*-*-*-*-*-*-*-*-*-*-*-*-*-*-*-  RADIOLOGY RESULTS:  Ct Abdomen Pelvis W Contrast    Result Date: 2/4/2021  Impression: 1  Stable exam   No acute intra-abdominal pathology  Workstation performed: [id number]     *-*-*-*-*-*-*-*-*-*-*-*-*-*-*-*-*-*-*-*-*-*-*-*-*-*-*-*-*-*-*-*-*-*-*-*-*-*-*-*-*-*-*-*-*-*-*-*-*-*-*-*-*-*-  ECHOCARDIOGRAM AND OTHER CARDIOLOGY RESULTS:  Echocardiogram [facility] October 2018:   Normal left ventricular chamber size  Low normal left ventricular systolic     function  Normal regional wall motion  Normal left ventricular wall     thickness  Estimated left ventricular ejection fraction is 50%  Normal left atrial size  Structurally normal mitral valve without significant     stenosis  Trace mitral regurgitation  Structurally normal aortic valve without significant stenosis or     regurgitation  Normal right ventricular size and function  Normal right atrial size  Regadenoson nuclear stress test 2018 [facility]: There is mild fixed decreased labeling overlying the anterior wall on the non  attenuated corrected tomographic images which improves on the attenuated  corrected tomographic images and which moves normally on the gated portion of  the study most consistent with breast attenuation in this region  No other fixed  or reversible perfusion defects are demonstrated  Left ventricle is normal in  size  There is normal wall motion  The calculated left ventricular ejection  fraction is 52% which is above the normal lower limit of 50%    Impression: Essentially normal study         *-*-*-*-*-*-*-*-*-*-*-*-*-*-*-*-*-*-*-*-*-*-*-*-*-*-*-*-*-*-*-*-*-*-*-*-*-*-*-*-*-*-*-*-*-*-*-*-*-*-*-*-*-*-  SIGNATURES:   @SIG@   Ather MD Acosta     CC:   Norberto Mercado MD

## 2021-02-27 NOTE — ASSESSMENT & PLAN NOTE
Ms Kevin Rivers  Is a 39 year  Old female with history of unspecified cardiomyopathy in the past who his  Experiencing multiple complaints which are primarily GI related  She does have some chest pain also but this is atypical in description  Her physical examination is benign and she shows no signs of heart failure or has significant valvular heart disease  She has known ECG abnormality consisting of biphasic T-waves in anterior leads  She had stress testing performed in 2018 which did not identify significant perfusion abnormality  She has previously noted QT prolongation which is acquired and secondary to her use of anti psychotic medications including Geodon  QTC  On today's ECG is at acceptable  --   At this time I am arranging for 48 hour Holter monitor to see if she has dynamic T-wave abnormalities  If she does then will consider repeating exercise stress test with imaging modality  --   I am advising her to keep a diary of symptoms and bring  Record for review at next visit  --  I strongly urged her to quit smoking and  Alcohol as these are her single major risk factors  --   In future her physicians will have to  Be mindful during prescribing medications  and consider medications QT prolonging effects     All QT prolonging medications should be avoided  --   Regarding her abdominal symptoms and recently abnormal pancreatic enzymes during ER presentation I am advising her to reestablish follow-up with gastroenterologist as she recently missed an appointment

## 2021-02-27 NOTE — PATIENT INSTRUCTIONS
CARDIOLOGY ASSESSMENT & PLAN:   Diagnosis ICD-10-CM Associated Orders   1  Palpitations  R00 2 Holter monitor - 48 hour     POCT ECG   2  Abnormal ECG  R94 31 Holter monitor - 48 hour     POCT ECG     Stress test only, exercise   3  History of cardiomyopathy  Z86 79    4  Acquired QT prolongation  R94 31        History of cardiomyopathy  Ms Laurence Renteria  Is a 39 year  Old female with history of unspecified cardiomyopathy in the past who his  Experiencing multiple complaints which are primarily GI related  She does have some chest pain also but this is atypical in description  Her physical examination is benign and she shows no signs of heart failure or has significant valvular heart disease  She has known ECG abnormality consisting of biphasic T-waves in anterior leads  She had stress testing performed in 2018 which did not identify significant perfusion abnormality  She has previously noted QT prolongation which is acquired and secondary to her use of anti psychotic medications including Geodon  QTC  On today's ECG is at acceptable  --   At this time I am arranging for 48 hour Holter monitor to see if she has dynamic T-wave abnormalities  If she does then will consider repeating exercise stress test with imaging modality  --   I am advising her to keep a diary of symptoms and bring  Record for review at next visit  --  I strongly urged her to quit smoking and  Alcohol as these are her single major risk factors  --   In future her physicians will have to  Be mindful during prescribing medications  and consider medications QT prolonging effects     All QT prolonging medications should be avoided  --   Regarding her abdominal symptoms and recently abnormal pancreatic enzymes during ER presentation I am advising her to reestablish follow-up with gastroenterologist as she recently missed an appointment

## 2021-03-09 ENCOUNTER — HOSPITAL ENCOUNTER (OUTPATIENT)
Dept: NON INVASIVE DIAGNOSTICS | Facility: HOSPITAL | Age: 46
Discharge: HOME | End: 2021-03-09
Attending: INTERNAL MEDICINE
Payer: COMMERCIAL

## 2021-03-09 DIAGNOSIS — R00.2 PALPITATIONS: ICD-10-CM

## 2021-03-09 DIAGNOSIS — R94.31 ABNORMAL ECG: ICD-10-CM

## 2021-03-09 PROCEDURE — 93226 XTRNL ECG REC<48 HR SCAN A/R: CPT

## 2021-03-09 PROCEDURE — 93225 XTRNL ECG REC<48 HRS REC: CPT

## 2021-03-12 ENCOUNTER — TELEPHONE (OUTPATIENT)
Dept: NON INVASIVE DIAGNOSTICS | Facility: HOSPITAL | Age: 46
End: 2021-03-12

## 2021-03-19 PROCEDURE — 93227 XTRNL ECG REC<48 HR R&I: CPT | Performed by: INTERNAL MEDICINE

## 2021-03-27 NOTE — RESULT ENCOUNTER NOTE
Over all normal holter findings with avg HR 79 BPM  No major arrhythmias  Though patient had several symptoms these do not correlate with any abnormal heart rhythms or STT abnormalities

## 2021-04-16 ENCOUNTER — HOSPITAL ENCOUNTER (EMERGENCY)
Facility: HOSPITAL | Age: 46
Discharge: HOME | End: 2021-04-16
Attending: EMERGENCY MEDICINE
Payer: COMMERCIAL

## 2021-04-16 VITALS
HEART RATE: 66 BPM | DIASTOLIC BLOOD PRESSURE: 86 MMHG | OXYGEN SATURATION: 100 % | WEIGHT: 163.36 LBS | RESPIRATION RATE: 16 BRPM | TEMPERATURE: 96.8 F | SYSTOLIC BLOOD PRESSURE: 127 MMHG | BODY MASS INDEX: 24.12 KG/M2

## 2021-04-16 DIAGNOSIS — R22.0 RIGHT FACIAL SWELLING: Primary | ICD-10-CM

## 2021-04-16 DIAGNOSIS — J32.9 SINUSITIS: ICD-10-CM

## 2021-04-16 PROCEDURE — 99284 EMERGENCY DEPT VISIT MOD MDM: CPT | Performed by: EMERGENCY MEDICINE

## 2021-04-16 PROCEDURE — 99283 EMERGENCY DEPT VISIT LOW MDM: CPT

## 2021-04-16 RX ORDER — AMOXICILLIN AND CLAVULANATE POTASSIUM 875; 125 MG/1; MG/1
1 TABLET, FILM COATED ORAL ONCE
Status: COMPLETED | OUTPATIENT
Start: 2021-04-16 | End: 2021-04-16

## 2021-04-16 RX ORDER — AMOXICILLIN AND CLAVULANATE POTASSIUM 875; 125 MG/1; MG/1
1 TABLET, FILM COATED ORAL EVERY 12 HOURS
Qty: 14 TABLET | Refills: 0 | Status: SHIPPED | OUTPATIENT
Start: 2021-04-16 | End: 2021-04-23

## 2021-04-16 RX ADMIN — AMOXICILLIN AND CLAVULANATE POTASSIUM 1 TABLET: 875; 125 TABLET, FILM COATED ORAL at 16:55

## 2021-04-16 NOTE — ED PROVIDER NOTES
History  Chief Complaint   Patient presents with    Facial Swelling     rt sided facial swelling for 2 days  states she has also had dental pain and headaches for a few months     Patient is a 56-year-old female who presents with a 2 day history of right-sided facial swelling  States has had longstanding headaches and dental issues  Has not seen a dentist in over 2 years  Also admits to smoking half a pack cigarettes a day  Patient also states that she has been having sinus issues congestion and inability to breathe through the nose  Then openly admits to me that she has been sniffing cocaine for an undetermined amount of time  No fevers sweats chills  No difficulty swallowing  Prior to Admission Medications   Prescriptions Last Dose Informant Patient Reported? Taking?    LORazepam (ATIVAN) 1 mg tablet   No No   Sig: Take 1 tablet (1 mg total) by mouth 2 (two) times a day for 2 days   Patient not taking: Reported on 2/26/2021   aluminum-magnesium hydroxide-simethicone (MAALOX MAX) 400-400-40 MG/5ML suspension   No No   Sig: Take 10 mL by mouth every 6 (six) hours as needed for indigestion or heartburn (nausea)   dicyclomine (BENTYL) 20 mg tablet   Yes No   Sig: Take 20 mg by mouth 4 (four) times a day   famotidine (PEPCID) 40 MG tablet   No No   Sig: Take 1 tablet (40 mg total) by mouth daily at bedtime as needed for heartburn   ibuprofen (MOTRIN) 600 mg tablet   No No   Sig: Take 1 tablet (600 mg total) by mouth every 6 (six) hours as needed for mild pain   methocarbamol (ROBAXIN) 500 mg tablet   No No   Sig: Take 1 tablet (500 mg total) by mouth 3 (three) times a day as needed for muscle spasms   Patient not taking: Reported on 2/26/2021   methocarbamol (ROBAXIN) 750 mg tablet   No No   Sig: Take 1 tablet (750 mg total) by mouth every 6 (six) hours as needed for muscle spasms   Patient not taking: Reported on 3/10/2020   metoclopramide (REGLAN) 10 mg tablet   No No   Sig: Take 1 tablet (10 mg total) by mouth every 6 (six) hours   Patient not taking: Reported on 2/26/2021   metoclopramide (Reglan) 10 mg tablet   No No   Sig: Take 1 tablet (10 mg total) by mouth 4 (four) times a day As needed for nausea   mirtazapine (REMERON SOL-TAB) 30 mg dispersible tablet   Yes No   Sig: Take 30 mg by mouth   ondansetron (ZOFRAN-ODT) 4 mg disintegrating tablet   No No   Sig: Take 1 tablet (4 mg total) by mouth every 8 (eight) hours as needed for nausea or vomiting   oxyCODONE (ROXICODONE) 5 mg immediate release tablet   No No   Sig: Take 1 tablet (5 mg total) by mouth every 6 (six) hours as needed for severe painMax Daily Amount: 20 mg   Patient not taking: Reported on 2/18/2020   sucralfate (CARAFATE) 1 g/10 mL suspension   No No   Sig: Take 10 mL (1 g total) by mouth 4 (four) times a day   Patient not taking: Reported on 2/26/2021   sucralfate (CARAFATE) 1 g/10 mL suspension   No No   Sig: Take 10 mL (1 g total) by mouth 4 (four) times a day (with meals and at bedtime) for 5 days   Patient not taking: Reported on 2/26/2021   ziprasidone (GEODON) 80 mg capsule   Yes No   Sig: Take 80 mg by mouth      Facility-Administered Medications: None       Past Medical History:   Diagnosis Date    Cardiomyopathy ([facility] [address] )     Elevated transaminase measurement     Irritable bowel syndrome (IBS)     with Constipation    Nutritional deficiency     Overactive thyroid gland     Palpitations     Sweating abnormality     Vaginal infection     Vitamin D insufficiency        Past Surgical History:   Procedure Laterality Date    ANKLE SURGERY Left     FINGER SURGERY      TUBAL LIGATION      UPPER GASTROINTESTINAL ENDOSCOPY         Family History   Problem Relation Age of Onset    No Known Problems Mother     Diabetes Father     Cancer Father      I have reviewed and agree with the history as documented      E-Cigarette/Vaping    E-Cigarette Use Never User      E-Cigarette/Vaping Substances     Social History     Tobacco Use  Smoking status: Current Every Day Smoker     Packs/day: 0 50     Types: Cigarettes    Smokeless tobacco: Never Used   Substance Use Topics    Alcohol use: Yes     Frequency: Monthly or less     Drinks per session: 3 or 4     Comment: occasionally    Drug use: Yes     Types: Marijuana     Comment: occasionally       Review of Systems   Constitutional: Negative  HENT: Positive for congestion, dental problem and facial swelling  Eyes: Negative  Respiratory: Negative  Cardiovascular: Negative  Gastrointestinal: Negative  Endocrine: Negative  Genitourinary: Negative  Musculoskeletal: Negative  Skin: Negative  Allergic/Immunologic: Negative  Neurological: Negative  Hematological: Negative  Psychiatric/Behavioral: Negative  All other systems reviewed and are negative  Physical Exam  Physical Exam  Vitals signs and nursing note reviewed  Constitutional:       Appearance: Normal appearance  She is normal weight  HENT:      Head: Normocephalic and atraumatic  Right Ear: Tympanic membrane, ear canal and external ear normal       Left Ear: Tympanic membrane, ear canal and external ear normal       Mouth/Throat:      Mouth: Mucous membranes are moist       Pharynx: Oropharynx is clear  Comments: Patient with several dental fillings  No abscess point tenderness fluctuance trismus tongue protrusion no brawny discoloration of the chin  Patient does have minimal swelling on the lower right cheek  Neck:      Musculoskeletal: Normal range of motion and neck supple  Cardiovascular:      Rate and Rhythm: Normal rate and regular rhythm  Pulses: Normal pulses  Heart sounds: Normal heart sounds  Pulmonary:      Effort: Pulmonary effort is normal       Breath sounds: Normal breath sounds  Abdominal:      General: Abdomen is flat  Bowel sounds are normal       Palpations: Abdomen is soft  Musculoskeletal: Normal range of motion     Lymphadenopathy: Cervical: No cervical adenopathy  Skin:     General: Skin is warm and dry  Capillary Refill: Capillary refill takes less than 2 seconds  Neurological:      General: No focal deficit present  Mental Status: She is alert and oriented to person, place, and time  Psychiatric:         Mood and Affect: Mood normal          Behavior: Behavior normal          Vital Signs  ED Triage Vitals [04/16/21 1636]   Temperature Pulse Respirations Blood Pressure SpO2   (!) 96 8 °F (36 °C) 66 16 127/86 100 %      Temp Source Heart Rate Source Patient Position - Orthostatic VS BP Location FiO2 (%)   Tympanic Monitor Sitting Left arm --      Pain Score       9           Vitals:    04/16/21 1636   BP: 127/86   Pulse: 66   Patient Position - Orthostatic VS: Sitting         Visual Acuity      ED Medications  Medications   amoxicillin-clavulanate (AUGMENTIN) 875-125 mg per tablet 1 tablet (1 tablet Oral Given 4/16/21 1655)       Diagnostic Studies  Results Reviewed     None                 No orders to display              Procedures  Procedures         ED Course                                           MDM    Disposition  Final diagnoses:   Right facial swelling   Sinusitis     Time reflects when diagnosis was documented in both MDM as applicable and the Disposition within this note     Time User Action Codes Description Comment    4/16/2021  4:46 PM Arden Sycamore Add [R22 0] Right facial swelling     4/16/2021  4:49 PM [facility] Add [J32 9] Sinusitis       ED Disposition     ED Disposition Condition Date/Time Comment    Discharge Stable Fri Apr 16, 2021  4:50 PM Ian Cruz discharge to home/self care              Follow-up Information     Follow up With Specialties Details Why [address] [facility]   [address] [facility]  [phone]      Lucretia Clement MD Otolaryngology   [address]  [address]  [place] [address] [facility]  [phone]            Patient's Medications   Discharge Prescriptions    AMOXICILLIN-CLAVULANATE (AUGMENTIN) 875-125 MG PER TABLET    Take 1 tablet by mouth every 12 (twelve) hours for 7 days       Start Date: 4/16/2021 End Date: 4/23/2021       Order Dose: 1 tablet       Quantity: 14 tablet    Refills: 0     No discharge procedures on file      PDMP Review     None          ED Provider  Electronically Signed by           Sally Green MD  04/16/21 9586

## 2021-05-07 ENCOUNTER — NURSE TRIAGE (OUTPATIENT)
Dept: OTHER | Facility: OTHER | Age: 46
End: 2021-05-07

## 2021-05-07 DIAGNOSIS — Z20.828 SARS-ASSOCIATED CORONAVIRUS EXPOSURE: Primary | ICD-10-CM

## 2021-05-07 NOTE — TELEPHONE ENCOUNTER
Regarding: COVID 19 Symptomatic   ----- Message from Eitan Doctor sent at 5/7/2021  5:18 PM EDT -----  " I was in the ER about a week ago for GI issues and a sinus infection  I was prescribed antibiotics and now I am all out  I feel like I am getting worse  I am having diarrhea and a  Headache since this morning"  I explained to the pt  We could triage her for COVID symptoms (diarrhea and headache) and she agreed  I explained she could also go to an urgent care, ER, or make an appointment with a family doctor on Monday since she doesn't have one if she didn't want to [facility]

## 2021-05-07 NOTE — TELEPHONE ENCOUNTER
Reason for Disposition   [1] COVID-19 infection suspected by caller or triager AND [2] mild symptoms (cough, fever, or others) AND [1] no complications or SOB    Answer Assessment - Initial Assessment Questions  1  Were you within 6 feet or less, for up to 15 minutes or more with a person that has a confirmed COVID-19 test?     Unknown  2  What was the date of your exposure? Unknown  3  Are you experiencing any symptoms attributed to the virus?  (Assess for SOB, cough, fever, difficulty breathing)     Nasal congestion, headache, loose stools  No other symptoms  4  HIGH RISK: Do you have any history heart or lung conditions, weakened immune system, diabetes, Asthma, CHF, HIV, COPD, Chemo, renal failure, sickle cell, etc?     IBS, Gastritis, Cardiomyopathy  5  PREGNANCY: Are you pregnant or did you recently give birth? Denies  Protocols used: CORONAVIRUS (CPGFM-71) DIAGNOSED OR SUSPECTED-ADULT-      Pt has out of network PCP

## 2021-05-20 ENCOUNTER — OFFICE VISIT (OUTPATIENT)
Dept: OTOLARYNGOLOGY | Facility: CLINIC | Age: 46
End: 2021-05-20

## 2021-05-20 DIAGNOSIS — J32.0 CHRONIC MAXILLARY SINUSITIS: Primary | ICD-10-CM

## 2021-05-20 DIAGNOSIS — J34.89 NASAL SEPTAL PERFORATION: ICD-10-CM

## 2021-05-20 PROCEDURE — 99204 OFFICE O/P NEW MOD 45 MIN: CPT | Performed by: OTOLARYNGOLOGY

## 2021-05-20 RX ORDER — PREDNISONE 10 MG/1
TABLET ORAL
Qty: 30 TABLET | Refills: 0 | Status: SHIPPED | OUTPATIENT
Start: 2021-05-20 | End: 2021-11-22

## 2021-05-20 RX ORDER — DOXYCYCLINE 100 MG/1
100 TABLET ORAL 2 TIMES DAILY
Qty: 42 TABLET | Refills: 0 | Status: SHIPPED | OUTPATIENT
Start: 2021-05-20 | End: 2021-06-10

## 2021-05-20 NOTE — PROGRESS NOTES
Consultation - Otolaryngology - Head and Neck Surgery  Facial Plastic and Reconstructive Surgery  Ozzie Merida 42 y o  female MRN: [id number]  Encounter: [id number]        Assessment/Plan:  1  Chronic maxillary sinusitis  doxycycline (ADOXA) 100 MG tablet    predniSONE 10 mg tablet    CT sinus wo contrast   2  Nasal septal perforation         Reviewed CT head which demonstrated some mucosal thickening in the right maxillary sinus  This was incompletely visualized though  On exam she is noted to have a very large septal perforation with copious mucopurulent discharge and crusting  This was largely debrided  She admits to a history of intranasal cocaine use  Has been using cocaine for over 10 years  Stopped around 1 month ago when she started to notice problems with her nose  Discussed prognosis of septal perforation and possible management  She also clearly has a chronic sinus infection which interferes with septal mucosa from healing  We discussed the nature chronic sinusitis  We discussed starting 21 day course of antibiotics, 12 day steroid taper, and nasal steroids  We discussed that the use of appropriate medical therapy can substantially improve symptoms, but neither medications or surgery cure the disease and needs ongoing treatment  We discussed the risks of antibiotics, including, but not limited to, incomplete therapy, C diff colitis, medication her reactions, medication allergy, and others  We discussed the risks benefits and alternatives to oral prednisone therapy, including, but not limited to, GI distress in worsening of ulcers, elevation of blood sugar and diabetic complications, psychiatric complications, and avascular necrosis of hip  We will obtain a CT scan after therapy and have the patient return in 1 month for re-evaluation         History of Present Illness   Physician Requesting Consult: Niles Dudley  Reason for Consult / Principal Problem: Facial pain  HPI: Marcus Manju Mac is a 42y o  year old female who presents with facial pain  States this has been ongoing for several months  She describes as pressure between her eyes and in her cheeks  She was seen in the ED in Feb and had a CT head done - demonstrated some mucosal thickening right maxillary  Was discharge with Augmentin but did not have much improvement  Again tried another course of abx without any improvement  Recently started nasal saline irrigations  Noticed this past week that a large chunk came out of her nose  Has been blowing green mucus  No nasal sprays  Review of systems:  ROS was performed by the MA and documented in the attached note  This was reviewed personally      Historical Information   Past Medical History:   Diagnosis Date    Cardiomyopathy ([address] )     Elevated transaminase measurement     Irritable bowel syndrome (IBS)     with Constipation    Nutritional deficiency     Overactive thyroid gland     Palpitations     Sweating abnormality     Vaginal infection     Vitamin D insufficiency      Past Surgical History:   Procedure Laterality Date    ANKLE SURGERY Left     FINGER SURGERY      TUBAL LIGATION      UPPER GASTROINTESTINAL ENDOSCOPY       Social History   Social History     Substance and Sexual Activity   Alcohol Use Yes    Frequency: Monthly or less    Drinks per session: 3 or 4    Comment: occasionally     Social History     Substance and Sexual Activity   Drug Use Yes    Types: Marijuana    Comment: occasionally     Social History     Tobacco Use   Smoking Status Current Every Day Smoker    Packs/day: 0 50    Types: Cigarettes   Smokeless Tobacco Never Used     Family History:   Family History   Problem Relation Age of Onset    No Known Problems Mother     Diabetes Father     Cancer Father        Current Outpatient Medications on File Prior to Visit   Medication Sig    aluminum-magnesium hydroxide-simethicone (MAALOX MAX) 400-400-40 MG/5ML suspension Take 10 mL by mouth every 6 (six) hours as needed for indigestion or heartburn (nausea)    dicyclomine (BENTYL) 20 mg tablet Take 20 mg by mouth 4 (four) times a day    famotidine (PEPCID) 40 MG tablet Take 1 tablet (40 mg total) by mouth daily at bedtime as needed for heartburn    ibuprofen (MOTRIN) 600 mg tablet Take 1 tablet (600 mg total) by mouth every 6 (six) hours as needed for mild pain    LORazepam (ATIVAN) 1 mg tablet Take 1 tablet (1 mg total) by mouth 2 (two) times a day for 2 days (Patient not taking: Reported on 2/26/2021)    methocarbamol (ROBAXIN) 500 mg tablet Take 1 tablet (500 mg total) by mouth 3 (three) times a day as needed for muscle spasms (Patient not taking: Reported on 2/26/2021)    methocarbamol (ROBAXIN) 750 mg tablet Take 1 tablet (750 mg total) by mouth every 6 (six) hours as needed for muscle spasms (Patient not taking: Reported on 3/10/2020)    metoclopramide (Reglan) 10 mg tablet Take 1 tablet (10 mg total) by mouth 4 (four) times a day As needed for nausea    metoclopramide (REGLAN) 10 mg tablet Take 1 tablet (10 mg total) by mouth every 6 (six) hours (Patient not taking: Reported on 2/26/2021)    mirtazapine (REMERON SOL-TAB) 30 mg dispersible tablet Take 30 mg by mouth    ondansetron (ZOFRAN-ODT) 4 mg disintegrating tablet Take 1 tablet (4 mg total) by mouth every 8 (eight) hours as needed for nausea or vomiting    oxyCODONE (ROXICODONE) 5 mg immediate release tablet Take 1 tablet (5 mg total) by mouth every 6 (six) hours as needed for severe painMax Daily Amount: 20 mg (Patient not taking: Reported on 2/18/2020)    sucralfate (CARAFATE) 1 g/10 mL suspension Take 10 mL (1 g total) by mouth 4 (four) times a day (Patient not taking: Reported on 2/26/2021)    sucralfate (CARAFATE) 1 g/10 mL suspension Take 10 mL (1 g total) by mouth 4 (four) times a day (with meals and at bedtime) for 5 days (Patient not taking: Reported on 2/26/2021)    ziprasidone (GEODON) 80 mg capsule Take 80 mg by mouth     No current facility-administered medications on file prior to visit  Allergies   Allergen Reactions    Gabapentin Anaphylaxis    Hydrocodone Hives    Adhesive [Medical Tape] Rash       There were no vitals filed for this visit  Physical Exam   Constitutional: Oriented to person, place, and time  Well-developed and well-nourished, no apparent distress, non-toxic appearance  Cooperative, able to hear and answer questions without difficulty  Voice: Normal voice quality  Head: Normocephalic, atraumatic  No scars, masses or lesions  Face: Symmetric, no edema, no sinus tenderness  Eyes: Vision grossly intact, extra-ocular movement intact  Ears: External ears normal  Tympanic membranes intact with intact normal landmarks  No post-auricular erythema or tenderness  Nose: Very large septal perforation with surrounding crusting and debris  Oral cavity: Dentition intact  Mucosa moist, lips without lesions or masses  Tongue mobile, floor of mouth soft and flat  Hard palate intact  No masses or lesions  Oropharynx: Uvula is midline, soft palate intact without lesion or mass  Oropharyngeal inlet without obstruction  Tonsils unremarkable  Posterior pharyngeal wall clear  No masses or lesions  Salivary glands:  Parotid glands and submandibular glands symmetric, no enlargement or tenderness  Neck: Normal laryngeal elevation with swallow  Trachea midline  No masses or lesions  No palpable adenopathy  Thyroid: Without tenderness or palpable nodules  Pulmonary/Chest: Normal effort and rate  No respiratory distress  No stertor or stridor  Musculoskeletal: Normal range of motion  Neurological: Cranial nerves 2-12 intact  Skin: Skin is warm and dry  Psychiatric: Normal mood and affect  Imaging Studies: I have personally reviewed pertinent reports  Lab Results: I have personally reviewed pertinent lab results

## 2021-06-02 DIAGNOSIS — J32.0 CHRONIC MAXILLARY SINUSITIS: ICD-10-CM

## 2021-06-02 RX ORDER — DOXYCYCLINE 100 MG/1
100 TABLET ORAL 2 TIMES DAILY
Qty: 42 TABLET | Refills: 0 | OUTPATIENT
Start: 2021-06-02 | End: 2021-06-23

## 2021-06-16 ENCOUNTER — HOSPITAL ENCOUNTER (OUTPATIENT)
Dept: CT IMAGING | Facility: HOSPITAL | Age: 46
Discharge: HOME | End: 2021-06-16
Payer: COMMERCIAL

## 2021-06-16 DIAGNOSIS — J32.0 CHRONIC MAXILLARY SINUSITIS: ICD-10-CM

## 2021-06-16 PROCEDURE — G1004 CDSM NDSC: HCPCS

## 2021-06-16 PROCEDURE — 70486 CT MAXILLOFACIAL W/O DYE: CPT

## 2021-06-18 ENCOUNTER — HOSPITAL ENCOUNTER (EMERGENCY)
Facility: HOSPITAL | Age: 46
Discharge: HOME | End: 2021-06-18
Attending: EMERGENCY MEDICINE | Admitting: EMERGENCY MEDICINE
Payer: COMMERCIAL

## 2021-06-18 VITALS
TEMPERATURE: 96.4 F | HEART RATE: 69 BPM | DIASTOLIC BLOOD PRESSURE: 70 MMHG | SYSTOLIC BLOOD PRESSURE: 110 MMHG | RESPIRATION RATE: 18 BRPM | OXYGEN SATURATION: 97 % | WEIGHT: 154.32 LBS | BODY MASS INDEX: 22.79 KG/M2

## 2021-06-18 DIAGNOSIS — F11.90 NARCOTIC DRUG USE: ICD-10-CM

## 2021-06-18 DIAGNOSIS — F19.10 SUBSTANCE ABUSE (HCC): Primary | ICD-10-CM

## 2021-06-18 DIAGNOSIS — R94.31 QT PROLONGATION: ICD-10-CM

## 2021-06-18 LAB
ATRIAL RATE: 58 BPM
ATRIAL RATE: 78 BPM
GLUCOSE SERPL-MCNC: 198 MG/DL (ref 65–140)
P AXIS: 27 DEGREES
P AXIS: 45 DEGREES
PR INTERVAL: 140 MS
PR INTERVAL: 144 MS
QRS AXIS: -4 DEGREES
QRS AXIS: 2 DEGREES
QRSD INTERVAL: 82 MS
QRSD INTERVAL: 86 MS
QT INTERVAL: 460 MS
QT INTERVAL: 500 MS
QTC INTERVAL: 490 MS
QTC INTERVAL: 524 MS
T WAVE AXIS: 14 DEGREES
T WAVE AXIS: 20 DEGREES
VENTRICULAR RATE: 58 BPM
VENTRICULAR RATE: 78 BPM

## 2021-06-18 PROCEDURE — 93005 ELECTROCARDIOGRAM TRACING: CPT

## 2021-06-18 PROCEDURE — 96365 THER/PROPH/DIAG IV INF INIT: CPT

## 2021-06-18 PROCEDURE — 99284 EMERGENCY DEPT VISIT MOD MDM: CPT

## 2021-06-18 PROCEDURE — 99282 EMERGENCY DEPT VISIT SF MDM: CPT | Performed by: EMERGENCY MEDICINE

## 2021-06-18 PROCEDURE — 93010 ELECTROCARDIOGRAM REPORT: CPT | Performed by: INTERNAL MEDICINE

## 2021-06-18 PROCEDURE — 82948 REAGENT STRIP/BLOOD GLUCOSE: CPT

## 2021-06-18 RX ORDER — MAGNESIUM SULFATE HEPTAHYDRATE 40 MG/ML
2 INJECTION, SOLUTION INTRAVENOUS ONCE
Status: COMPLETED | OUTPATIENT
Start: 2021-06-18 | End: 2021-06-18

## 2021-06-18 RX ADMIN — MAGNESIUM SULFATE IN WATER 2 G: 40 INJECTION, SOLUTION INTRAVENOUS at 17:49

## 2021-06-18 NOTE — DISCHARGE INSTRUCTIONS
STOP USING DRUGS  YOU REQUIRED NARCAN WHICH REVERSES OPIOIDS  Make sure you talk to your family doctor about your medications as your medications can prolonged your QTC    This can cause cardiac arrhythmia and death

## 2021-06-18 NOTE — ED PROVIDER NOTES
History  Chief Complaint   Patient presents with    Overdose - Accidental     pt arrives awake and repeatedly stating "I don't know what happened, how did I get here ?" "I didn't do anything "  per EMS, summoned by S/O for pt unresponsive and apneic on sofa, 4 mg Narcan IN given by PD, and 2 mg Narcan IV given by EMS      Patient is a 82-year-old female brought in by EMS  Patient received 4 mg intranasal Narcan as well as 2 mg IV Narcan  Patient denies any drug abuse  She states she took her Geodon her Xanax  Discussed with patient that she required this medication which Re 1st is opioids  She adamantly denies any drug abuse at this time patient is only asking for significant other  Per EMS, patient was found unresponsive on couch  History provided by:  Patient and EMS personnel   used: No    Overdose - Accidental  Ingested substance:  Illicit drugs  Suspected agents: unknown  Witnesses present: no    Called poison control: no    Incident location:  Home  Context: recreational    Associated symptoms: unresponsiveness    Associated symptoms: no abdominal pain, no chest pain, no cough, no shortness of breath and no vomiting        Prior to Admission Medications   Prescriptions Last Dose Informant Patient Reported? Taking?    LORazepam (ATIVAN) 1 mg tablet   No No   Sig: Take 1 tablet (1 mg total) by mouth 2 (two) times a day for 2 days   Patient not taking: Reported on 2/26/2021   aluminum-magnesium hydroxide-simethicone (MAALOX MAX) 400-400-40 MG/5ML suspension   No No   Sig: Take 10 mL by mouth every 6 (six) hours as needed for indigestion or heartburn (nausea)   dicyclomine (BENTYL) 20 mg tablet   Yes No   Sig: Take 20 mg by mouth 4 (four) times a day   famotidine (PEPCID) 40 MG tablet   No No   Sig: Take 1 tablet (40 mg total) by mouth daily at bedtime as needed for heartburn   ibuprofen (MOTRIN) 600 mg tablet   No No   Sig: Take 1 tablet (600 mg total) by mouth every 6 (six) hours as needed for mild pain   methocarbamol (ROBAXIN) 500 mg tablet   No No   Sig: Take 1 tablet (500 mg total) by mouth 3 (three) times a day as needed for muscle spasms   Patient not taking: Reported on 2/26/2021   methocarbamol (ROBAXIN) 750 mg tablet   No No   Sig: Take 1 tablet (750 mg total) by mouth every 6 (six) hours as needed for muscle spasms   Patient not taking: Reported on 3/10/2020   metoclopramide (REGLAN) 10 mg tablet   No No   Sig: Take 1 tablet (10 mg total) by mouth every 6 (six) hours   Patient not taking: Reported on 2/26/2021   metoclopramide (Reglan) 10 mg tablet   No No   Sig: Take 1 tablet (10 mg total) by mouth 4 (four) times a day As needed for nausea   mirtazapine (REMERON SOL-TAB) 30 mg dispersible tablet   Yes No   Sig: Take 30 mg by mouth   ondansetron (ZOFRAN-ODT) 4 mg disintegrating tablet   No No   Sig: Take 1 tablet (4 mg total) by mouth every 8 (eight) hours as needed for nausea or vomiting   oxyCODONE (ROXICODONE) 5 mg immediate release tablet   No No   Sig: Take 1 tablet (5 mg total) by mouth every 6 (six) hours as needed for severe painMax Daily Amount: 20 mg   Patient not taking: Reported on 2/18/2020   predniSONE 10 mg tablet   No No   Sig: Take 40 mg daily for 3 days  Then take 30 mg daily for 3 days  Then take 20 mg daily for 3 days  Then take 10 mg daily for 3 days     sucralfate (CARAFATE) 1 g/10 mL suspension   No No   Sig: Take 10 mL (1 g total) by mouth 4 (four) times a day   Patient not taking: Reported on 2/26/2021   sucralfate (CARAFATE) 1 g/10 mL suspension   No No   Sig: Take 10 mL (1 g total) by mouth 4 (four) times a day (with meals and at bedtime) for 5 days   Patient not taking: Reported on 2/26/2021   ziprasidone (GEODON) 80 mg capsule   Yes No   Sig: Take 80 mg by mouth      Facility-Administered Medications: None       Past Medical History:   Diagnosis Date    Cardiomyopathy ([address] )     Elevated transaminase measurement     Irritable bowel syndrome (IBS) with Constipation    Nutritional deficiency     Overactive thyroid gland     Palpitations     Sweating abnormality     Vaginal infection     Vitamin D insufficiency        Past Surgical History:   Procedure Laterality Date    ANKLE SURGERY Left     FINGER SURGERY      TUBAL LIGATION      UPPER GASTROINTESTINAL ENDOSCOPY         Family History   Problem Relation Age of Onset    No Known Problems Mother     Diabetes Father     Cancer Father      I have reviewed and agree with the history as documented  E-Cigarette/Vaping    E-Cigarette Use Never User      E-Cigarette/Vaping Substances     Social History     Tobacco Use    Smoking status: Current Every Day Smoker     Packs/day: 0 50     Types: Cigarettes    Smokeless tobacco: Never Used   Vaping Use    Vaping Use: Never used   Substance Use Topics    Alcohol use: Yes     Comment: occasionally    Drug use: Yes     Types: Cocaine       Review of Systems   Constitutional: Negative  Negative for chills and fever  HENT: Negative  Negative for ear pain and sore throat  Eyes: Negative for pain and visual disturbance  Respiratory: Negative  Negative for cough and shortness of breath  Cardiovascular: Negative  Negative for chest pain and palpitations  Gastrointestinal: Negative  Negative for abdominal pain and vomiting  Genitourinary: Negative  Negative for dysuria and hematuria  Musculoskeletal: Negative  Negative for arthralgias and back pain  Skin: Negative  Negative for color change and rash  Neurological: Negative for seizures and syncope  Hematological: Negative  Psychiatric/Behavioral: Negative  All other systems reviewed and are negative  Physical Exam  Physical Exam  Vitals and nursing note reviewed  Constitutional:       General: She is not in acute distress  Appearance: She is well-developed  HENT:      Head: Normocephalic and atraumatic        Comments: Patient maintaining airway and secretions  No stridor   No brawniness under tongue  Mouth/Throat:      Mouth: Mucous membranes are moist    Eyes:      Extraocular Movements: Extraocular movements intact  Conjunctiva/sclera: Conjunctivae normal       Pupils: Pupils are equal, round, and reactive to light  Cardiovascular:      Rate and Rhythm: Normal rate and regular rhythm  Heart sounds: No murmur heard  Pulmonary:      Effort: Pulmonary effort is normal  No respiratory distress  Breath sounds: Normal breath sounds  Abdominal:      Palpations: Abdomen is soft  Tenderness: There is no abdominal tenderness  Musculoskeletal:      Cervical back: Neck supple  Skin:     General: Skin is warm and dry  Neurological:      General: No focal deficit present  Mental Status: She is alert and oriented to person, place, and time  GCS: GCS eye subscore is 4  GCS verbal subscore is 5  GCS motor subscore is 6  Cranial Nerves: Cranial nerves are intact  Sensory: Sensation is intact  Motor: Motor function is intact  Coordination: Coordination is intact  Gait: Gait is intact  Psychiatric:         Mood and Affect: Mood normal          Behavior: Behavior normal          Thought Content:  Thought content normal          Judgment: Judgment normal          Vital Signs  ED Triage Vitals [06/18/21 1712]   Temperature Pulse Respirations Blood Pressure SpO2   (!) 96 4 °F (35 8 °C) 99 20 117/65 100 %      Temp Source Heart Rate Source Patient Position - Orthostatic VS BP Location FiO2 (%)   Tympanic Monitor Sitting Left arm --      Pain Score       --           Vitals:    06/18/21 1712   BP: 117/65   Pulse: 99   Patient Position - Orthostatic VS: Sitting         Visual Acuity      ED Medications  Medications - No data to display    Diagnostic Studies  Results Reviewed     None                 No orders to display              Procedures  Procedures         ED Course  ED Course as of Jun 18 1952   Fri Jun 18, 202118, 2021 1715 Patient is a 51-year-old female coming in today after an overdose  Patient denies any opioid use however  glucose as well as EKG  did require 6 mg Narcan  Will observe check fingerstick    Portions of the record may have been created with voice recognition software  Occasional wrong word or "sound a like" substitutions may have occurred due to the inherent limitations of voice recognition software  Read the chart carefully and recognize, using context, where substitutions have occurred  1735 Noted EKG with prolonged Qtc noted today at 524 ms  Patient was seen by cardiology - at that time Qtc was <500 ms  Will give Mag and repeat EKG  Patient with noted history of acquired QTC due to antipsychotics  1901 Patient's magnesium completed  Will repeat EKG      1922 QTC is improved  Will discharge home patient with magnesium and discussed with patient regarding avoidance of drugs as well as to discuss with her family doctor regarding [address] Patient and family updated made aware of need for follow-up with PCP  Patient states she stopped her thyroid medication but did not stop any of her other medications  Patient family myself discussed that some of these medications as antipsychotics and antidepressants can cause prolonged QTC  This can cause arrhythmia and death  Patient resting in bed in no distress  Family at bedside will DC                                SBIRT 20yo+      Most Recent Value   SBIRT (22 yo +)   In order to provide better care to our patients, we are screening all of our patients for alcohol and drug use  Would it be okay to ask you these screening questions? Yes Filed at: 06/18/2021 1717   Initial Alcohol Screen: US AUDIT-C    1  How often do you have a drink containing alcohol? 2 Filed at: 06/18/2021 1717   2  How many drinks containing alcohol do you have on a typical day you are drinking? 0 Filed at: 06/18/2021 1717   3b   FEMALE Any Age, or MALE 65+: How often do you have 4 or more drinks on one occassion? 2 Filed at: 06/18/2021 1717   Audit-C Score  4 Filed at: 06/18/2021 1717   PRABHJOT: How many times in the past year have you    Used an illegal drug or used a prescription medication for non-medical reasons? Monthly Filed at: 06/18/2021 1717                    MDM  Number of Diagnoses or Management Options  Narcotic drug use  Substance abuse ([facility] [address] )  Diagnosis management comments:     EKG INTERPRETATION @ 1726  RHYTHM:NSR at 78 bpm  AXIS: Normal axis   INTERVALS: IL at 144 ms  QRS COMPLEX: QRS @ 86 ms  ST SEGMENT: non specific st segment changes  Diffuse artifact  QT INTERVAL: QTC @ 524 ms  COMPARED WITH PRIOR compared to old no acute changes  Interpretation by Delphine Garcia DO    EKG INTERPRETATION @ 1916  RHYTHM:  Normal sinus rhythm at 60 beats per minute  AXIS:  Normal axis  INTERVALS: IL interval measured at 140 milliseconds  QRS COMPLEX:  QRS measured at 82 milliseconds  ST SEGMENT:  Nonspecific ST segment changes  Diffuse artifact  Deep T-wave inversion in V1 V2 V3  QT INTERVAL:  QTC measured at 490 milliseconds  COMPARED WITH PRIOR compared to prior today QTC is markedly improved  Interpretation by Delphine Garcia DO         Amount and/or Complexity of Data Reviewed  Clinical lab tests: ordered and reviewed  Tests in the medicine section of CPT®: ordered and reviewed        Disposition  Final diagnoses:   None     ED Disposition     None      Follow-up Information    None         Patient's Medications   Discharge Prescriptions    No medications on file     No discharge procedures on file      PDMP Review     None          ED Provider  Electronically Signed by           Dinorah Brown DO  06/18/21 1952

## 2021-06-18 NOTE — ED NOTES
Patient requesting ginger ale with ice  Drink brought to patient at this time        Isabelle Bermudez RN  06/18/21 1924

## 2021-09-09 ENCOUNTER — OFFICE VISIT (OUTPATIENT)
Dept: OTOLARYNGOLOGY | Facility: CLINIC | Age: 46
End: 2021-09-09
Payer: COMMERCIAL

## 2021-09-09 VITALS
HEIGHT: 69 IN | SYSTOLIC BLOOD PRESSURE: 118 MMHG | TEMPERATURE: 97.9 F | DIASTOLIC BLOOD PRESSURE: 74 MMHG | HEART RATE: 68 BPM | WEIGHT: 158 LBS | BODY MASS INDEX: 23.4 KG/M2

## 2021-09-09 DIAGNOSIS — J32.0 CHRONIC MAXILLARY SINUSITIS: Primary | ICD-10-CM

## 2021-09-09 DIAGNOSIS — J34.89 NASAL SEPTAL PERFORATION: ICD-10-CM

## 2021-09-09 PROCEDURE — 87070 CULTURE OTHR SPECIMN AEROBIC: CPT | Performed by: OTOLARYNGOLOGY

## 2021-09-09 PROCEDURE — 87205 SMEAR GRAM STAIN: CPT | Performed by: OTOLARYNGOLOGY

## 2021-09-09 PROCEDURE — 99214 OFFICE O/P EST MOD 30 MIN: CPT | Performed by: OTOLARYNGOLOGY

## 2021-09-09 PROCEDURE — 87186 SC STD MICRODIL/AGAR DIL: CPT | Performed by: OTOLARYNGOLOGY

## 2021-09-09 PROCEDURE — 87077 CULTURE AEROBIC IDENTIFY: CPT | Performed by: OTOLARYNGOLOGY

## 2021-09-09 RX ORDER — METHIMAZOLE 5 MG/1
2.5 TABLET ORAL DAILY
COMMUNITY
Start: 2021-07-29

## 2021-09-09 RX ORDER — ALPRAZOLAM 1 MG/1
TABLET ORAL
COMMUNITY
Start: 2021-08-31 | End: 2021-11-23 | Stop reason: HOSPADM

## 2021-09-09 RX ORDER — ZIPRASIDONE HYDROCHLORIDE 80 MG/1
80 CAPSULE ORAL
COMMUNITY
Start: 2021-08-12

## 2021-09-09 RX ORDER — OMEPRAZOLE 40 MG/1
CAPSULE, DELAYED RELEASE ORAL
COMMUNITY
Start: 2021-09-02

## 2021-09-09 NOTE — PROGRESS NOTES
Raudel Mabry is a 42 y  o female who presents for re-evaluation of chronic sinusitis/nasal septal perforation  Since her prior visit she completed treatment with doxy + pred  Felt better while on abx but symptoms seem to have gradually returned  CT sinus completed  Still using intermittent nasal cocaine  Past Medical History:   Diagnosis Date    Cardiomyopathy ([address] )     Elevated transaminase measurement     Irritable bowel syndrome (IBS)     with Constipation    Nutritional deficiency     Overactive thyroid gland     Palpitations     Sweating abnormality     Vaginal infection     Vitamin D insufficiency        /74 (BP Location: Left arm, Patient Position: Sitting, Cuff Size: Standard)   Pulse 68   Temp 97 9 °F (36 6 °C) (Temporal)   Ht 5' 9" (1 753 m)   Wt 71 7 kg (158 lb)   BMI 23 33 kg/m²       Physical Exam   Constitutional: Oriented to person, place, and time  Well-developed and well-nourished, no apparent distress, non-toxic appearance  Cooperative, able to hear and answer questions without difficulty  Voice: Normal voice quality  Head: Normocephalic, atraumatic  No scars, masses or lesions  Face: Symmetric, no edema, no sinus tenderness  Eyes: Vision grossly intact, extra-ocular movement intact  Ears: External ear normal   Bilateral tympanic membranes are intact with intact normal landmarks  No post-auricular erythema or tenderness  Nose: Septum deviates R  Large perforation - improvement in crusting  Pulmonary/Chest: Normal effort and rate  No respiratory distress  Musculoskeletal: Normal range of motion  Neurological: Cranial nerves 2-12 intact  Skin: Skin is warm and dry  Psychiatric: Normal mood and affect  A/P: Significant improvement in crusting but still with very large septal perforation  CT sinus indicated chronic left maxillary sinusitis  We discussed options for additional management including potential role for septoplasty/FESS       For now will culture the perforation/nasal crusting and treat appropriately  We discussed the difficult nature of septoplasty and perforation repair - especially with ongoing intranasal cocaine use  She will try and refrain from drug use  We discussed options for drug rehab or additional resources though defers for now  Follow up in 6-8 weeks with Dr Philip Almaraz, sooner prn

## 2021-09-15 LAB
BACTERIA WND AEROBE CULT: ABNORMAL
BACTERIA WND AEROBE CULT: ABNORMAL
GRAM STN SPEC: ABNORMAL
GRAM STN SPEC: ABNORMAL

## 2021-10-18 ENCOUNTER — HOSPITAL ENCOUNTER (EMERGENCY)
Facility: HOSPITAL | Age: 46
Discharge: HOME | End: 2021-10-18
Attending: EMERGENCY MEDICINE
Payer: COMMERCIAL

## 2021-10-18 VITALS
BODY MASS INDEX: 25.39 KG/M2 | TEMPERATURE: 97 F | HEART RATE: 70 BPM | SYSTOLIC BLOOD PRESSURE: 161 MMHG | OXYGEN SATURATION: 100 % | DIASTOLIC BLOOD PRESSURE: 100 MMHG | WEIGHT: 171.9 LBS | RESPIRATION RATE: 16 BRPM

## 2021-10-18 DIAGNOSIS — Z20.822 COVID-19 VIRUS TEST RESULT UNKNOWN: Primary | ICD-10-CM

## 2021-10-18 LAB
FLUAV RNA RESP QL NAA+PROBE: NEGATIVE
FLUBV RNA RESP QL NAA+PROBE: NEGATIVE
RSV RNA RESP QL NAA+PROBE: NEGATIVE
SARS-COV-2 RNA RESP QL NAA+PROBE: NEGATIVE

## 2021-10-18 PROCEDURE — 99283 EMERGENCY DEPT VISIT LOW MDM: CPT | Performed by: EMERGENCY MEDICINE

## 2021-10-18 PROCEDURE — 99283 EMERGENCY DEPT VISIT LOW MDM: CPT

## 2021-10-18 PROCEDURE — 0241U HB NFCT DS VIR RESP RNA 4 TRGT: CPT | Performed by: EMERGENCY MEDICINE

## 2021-11-09 ENCOUNTER — TELEPHONE (OUTPATIENT)
Dept: OTOLARYNGOLOGY | Facility: CLINIC | Age: 46
End: 2021-11-09

## 2021-11-09 DIAGNOSIS — J32.0 CHRONIC MAXILLARY SINUSITIS: Primary | ICD-10-CM

## 2021-11-09 RX ORDER — SULFAMETHOXAZOLE AND TRIMETHOPRIM 800; 160 MG/1; MG/1
1 TABLET ORAL EVERY 12 HOURS SCHEDULED
Qty: 28 TABLET | Refills: 0 | Status: SHIPPED | OUTPATIENT
Start: 2021-11-09 | End: 2021-11-22

## 2021-11-22 ENCOUNTER — APPOINTMENT (OUTPATIENT)
Dept: CT IMAGING | Facility: HOSPITAL | Age: 46
End: 2021-11-22
Payer: COMMERCIAL

## 2021-11-22 ENCOUNTER — APPOINTMENT (EMERGENCY)
Dept: CT IMAGING | Facility: HOSPITAL | Age: 46
End: 2021-11-22
Payer: COMMERCIAL

## 2021-11-22 ENCOUNTER — APPOINTMENT (EMERGENCY)
Dept: RADIOLOGY | Facility: HOSPITAL | Age: 46
End: 2021-11-22
Payer: COMMERCIAL

## 2021-11-22 ENCOUNTER — HOSPITAL ENCOUNTER (OUTPATIENT)
Facility: HOSPITAL | Age: 46
Setting detail: OBSERVATION
Discharge: HOME | End: 2021-11-23
Attending: EMERGENCY MEDICINE | Admitting: STUDENT IN AN ORGANIZED HEALTH CARE EDUCATION/TRAINING PROGRAM
Payer: COMMERCIAL

## 2021-11-22 DIAGNOSIS — E87.1 HYPONATREMIA: ICD-10-CM

## 2021-11-22 DIAGNOSIS — J18.9 MULTIFOCAL PNEUMONIA: Primary | ICD-10-CM

## 2021-11-22 DIAGNOSIS — R79.89 ELEVATED LIVER FUNCTION TESTS: ICD-10-CM

## 2021-11-22 PROBLEM — F19.10 SUBSTANCE ABUSE (HCC): Status: ACTIVE | Noted: 2021-11-22

## 2021-11-22 PROBLEM — F39 MOOD DISORDER (HCC): Status: ACTIVE | Noted: 2021-11-22

## 2021-11-22 PROBLEM — E05.90 HYPERTHYROIDISM: Status: ACTIVE | Noted: 2021-11-22

## 2021-11-22 LAB
2HR DELTA HS TROPONIN: -1 NG/L
ALBUMIN SERPL BCP-MCNC: 3.4 G/DL (ref 3.5–5)
ALP SERPL-CCNC: 166 U/L (ref 46–116)
ALT SERPL W P-5'-P-CCNC: 43 U/L (ref 12–78)
AMPHETAMINES SERPL QL SCN: NEGATIVE
ANION GAP SERPL CALCULATED.3IONS-SCNC: 10 MMOL/L (ref 4–13)
APAP SERPL-MCNC: <2 UG/ML (ref 10–20)
AST SERPL W P-5'-P-CCNC: 80 U/L (ref 5–45)
ATRIAL RATE: 89 BPM
BACTERIA UR QL AUTO: ABNORMAL /HPF
BARBITURATES UR QL: NEGATIVE
BASOPHILS # BLD AUTO: 0.01 THOUSANDS/ΜL (ref 0–0.1)
BASOPHILS NFR BLD AUTO: 0 % (ref 0–1)
BENZODIAZ UR QL: POSITIVE
BILIRUB SERPL-MCNC: 0.24 MG/DL (ref 0.2–1)
BILIRUB UR QL STRIP: NEGATIVE
BUN SERPL-MCNC: 6 MG/DL (ref 5–25)
CALCIUM ALBUM COR SERPL-MCNC: 8.4 MG/DL (ref 8.3–10.1)
CALCIUM SERPL-MCNC: 7.9 MG/DL (ref 8.3–10.1)
CARDIAC TROPONIN I PNL SERPL HS: 7 NG/L
CARDIAC TROPONIN I PNL SERPL HS: 8 NG/L
CHLORIDE SERPL-SCNC: 95 MMOL/L (ref 100–108)
CLARITY UR: CLEAR
CO2 SERPL-SCNC: 25 MMOL/L (ref 21–32)
COCAINE UR QL: POSITIVE
COLOR UR: YELLOW
CREAT SERPL-MCNC: 1.12 MG/DL (ref 0.6–1.3)
EOSINOPHIL # BLD AUTO: 0.67 THOUSAND/ΜL (ref 0–0.61)
EOSINOPHIL NFR BLD AUTO: 9 % (ref 0–6)
ERYTHROCYTE [DISTWIDTH] IN BLOOD BY AUTOMATED COUNT: 13.7 % (ref 11.6–15.1)
ETHANOL SERPL-MCNC: <3 MG/DL (ref 0–3)
EXT PREG TEST URINE: NEGATIVE
EXT. CONTROL ED NAV: NORMAL
FLUAV RNA RESP QL NAA+PROBE: NEGATIVE
FLUBV RNA RESP QL NAA+PROBE: NEGATIVE
GFR SERPL CREATININE-BSD FRML MDRD: 60 ML/MIN/1.73SQ M
GLUCOSE SERPL-MCNC: 111 MG/DL (ref 65–140)
GLUCOSE UR STRIP-MCNC: NEGATIVE MG/DL
HCT VFR BLD AUTO: 38.4 % (ref 34.8–46.1)
HGB BLD-MCNC: 12.5 G/DL (ref 11.5–15.4)
HGB UR QL STRIP.AUTO: ABNORMAL
IMM GRANULOCYTES # BLD AUTO: 0.06 THOUSAND/UL (ref 0–0.2)
IMM GRANULOCYTES NFR BLD AUTO: 1 % (ref 0–2)
KETONES UR STRIP-MCNC: NEGATIVE MG/DL
LACTATE SERPL-SCNC: 1 MMOL/L (ref 0.5–2)
LEUKOCYTE ESTERASE UR QL STRIP: NEGATIVE
LYMPHOCYTES # BLD AUTO: 0.54 THOUSANDS/ΜL (ref 0.6–4.47)
LYMPHOCYTES NFR BLD AUTO: 7 % (ref 14–44)
MAGNESIUM SERPL-MCNC: 2 MG/DL (ref 1.6–2.6)
MCH RBC QN AUTO: 31.8 PG (ref 26.8–34.3)
MCHC RBC AUTO-ENTMCNC: 32.6 G/DL (ref 31.4–37.4)
MCV RBC AUTO: 98 FL (ref 82–98)
METHADONE UR QL: NEGATIVE
MONOCYTES # BLD AUTO: 0.23 THOUSAND/ΜL (ref 0.17–1.22)
MONOCYTES NFR BLD AUTO: 3 % (ref 4–12)
NEUTROPHILS # BLD AUTO: 6.41 THOUSANDS/ΜL (ref 1.85–7.62)
NEUTS SEG NFR BLD AUTO: 80 % (ref 43–75)
NITRITE UR QL STRIP: NEGATIVE
NON-SQ EPI CELLS URNS QL MICRO: ABNORMAL /HPF
NRBC BLD AUTO-RTO: 0 /100 WBCS
NT-PROBNP SERPL-MCNC: 172 PG/ML
OPIATES UR QL SCN: NEGATIVE
OXYCODONE+OXYMORPHONE UR QL SCN: NEGATIVE
P AXIS: 46 DEGREES
PCP UR QL: NEGATIVE
PH UR STRIP.AUTO: 6.5 [PH] (ref 4.5–8)
PLATELET # BLD AUTO: 219 THOUSANDS/UL (ref 149–390)
PMV BLD AUTO: 9.6 FL (ref 8.9–12.7)
POTASSIUM SERPL-SCNC: 4.4 MMOL/L (ref 3.5–5.3)
PR INTERVAL: 126 MS
PROCALCITONIN SERPL-MCNC: 0.09 NG/ML
PROCALCITONIN SERPL-MCNC: 0.11 NG/ML
PROT SERPL-MCNC: 6.6 G/DL (ref 6.4–8.2)
PROT UR STRIP-MCNC: NEGATIVE MG/DL
QRS AXIS: 96 DEGREES
QRSD INTERVAL: 86 MS
QT INTERVAL: 342 MS
QTC INTERVAL: 416 MS
RBC # BLD AUTO: 3.93 MILLION/UL (ref 3.81–5.12)
RBC #/AREA URNS AUTO: ABNORMAL /HPF
RSV RNA RESP QL NAA+PROBE: NEGATIVE
SALICYLATES SERPL-MCNC: 3 MG/DL (ref 3–20)
SARS-COV-2 RNA RESP QL NAA+PROBE: NEGATIVE
SODIUM SERPL-SCNC: 130 MMOL/L (ref 136–145)
SP GR UR STRIP.AUTO: 1.01 (ref 1–1.03)
T WAVE AXIS: 46 DEGREES
THC UR QL: POSITIVE
TSH SERPL DL<=0.05 MIU/L-ACNC: 3.12 UIU/ML (ref 0.36–3.74)
UROBILINOGEN UR QL STRIP.AUTO: 0.2 E.U./DL
VENTRICULAR RATE: 89 BPM
WBC # BLD AUTO: 7.92 THOUSAND/UL (ref 4.31–10.16)
WBC #/AREA URNS AUTO: ABNORMAL /HPF

## 2021-11-22 PROCEDURE — 96367 TX/PROPH/DG ADDL SEQ IV INF: CPT

## 2021-11-22 PROCEDURE — 71250 CT THORAX DX C-: CPT

## 2021-11-22 PROCEDURE — 99220 PR INITIAL OBSERVATION CARE/DAY 70 MINUTES: CPT | Performed by: STUDENT IN AN ORGANIZED HEALTH CARE EDUCATION/TRAINING PROGRAM

## 2021-11-22 PROCEDURE — G1004 CDSM NDSC: HCPCS

## 2021-11-22 PROCEDURE — 0241U HB NFCT DS VIR RESP RNA 4 TRGT: CPT | Performed by: EMERGENCY MEDICINE

## 2021-11-22 PROCEDURE — 82077 ASSAY SPEC XCP UR&BREATH IA: CPT | Performed by: EMERGENCY MEDICINE

## 2021-11-22 PROCEDURE — 70498 CT ANGIOGRAPHY NECK: CPT

## 2021-11-22 PROCEDURE — 81001 URINALYSIS AUTO W/SCOPE: CPT

## 2021-11-22 PROCEDURE — 84484 ASSAY OF TROPONIN QUANT: CPT | Performed by: EMERGENCY MEDICINE

## 2021-11-22 PROCEDURE — 99233 SBSQ HOSP IP/OBS HIGH 50: CPT | Performed by: INTERNAL MEDICINE

## 2021-11-22 PROCEDURE — 84145 PROCALCITONIN (PCT): CPT | Performed by: STUDENT IN AN ORGANIZED HEALTH CARE EDUCATION/TRAINING PROGRAM

## 2021-11-22 PROCEDURE — 36415 COLL VENOUS BLD VENIPUNCTURE: CPT | Performed by: EMERGENCY MEDICINE

## 2021-11-22 PROCEDURE — 71045 X-RAY EXAM CHEST 1 VIEW: CPT

## 2021-11-22 PROCEDURE — 96365 THER/PROPH/DIAG IV INF INIT: CPT

## 2021-11-22 PROCEDURE — 84443 ASSAY THYROID STIM HORMONE: CPT | Performed by: EMERGENCY MEDICINE

## 2021-11-22 PROCEDURE — 80307 DRUG TEST PRSMV CHEM ANLYZR: CPT | Performed by: EMERGENCY MEDICINE

## 2021-11-22 PROCEDURE — 96361 HYDRATE IV INFUSION ADD-ON: CPT

## 2021-11-22 PROCEDURE — 84145 PROCALCITONIN (PCT): CPT | Performed by: EMERGENCY MEDICINE

## 2021-11-22 PROCEDURE — 80143 DRUG ASSAY ACETAMINOPHEN: CPT | Performed by: EMERGENCY MEDICINE

## 2021-11-22 PROCEDURE — 80179 DRUG ASSAY SALICYLATE: CPT | Performed by: EMERGENCY MEDICINE

## 2021-11-22 PROCEDURE — 80053 COMPREHEN METABOLIC PANEL: CPT | Performed by: EMERGENCY MEDICINE

## 2021-11-22 PROCEDURE — 83605 ASSAY OF LACTIC ACID: CPT | Performed by: EMERGENCY MEDICINE

## 2021-11-22 PROCEDURE — 99285 EMERGENCY DEPT VISIT HI MDM: CPT | Performed by: EMERGENCY MEDICINE

## 2021-11-22 PROCEDURE — 87040 BLOOD CULTURE FOR BACTERIA: CPT | Performed by: EMERGENCY MEDICINE

## 2021-11-22 PROCEDURE — 85025 COMPLETE CBC W/AUTO DIFF WBC: CPT | Performed by: EMERGENCY MEDICINE

## 2021-11-22 PROCEDURE — 70496 CT ANGIOGRAPHY HEAD: CPT

## 2021-11-22 PROCEDURE — 83735 ASSAY OF MAGNESIUM: CPT | Performed by: EMERGENCY MEDICINE

## 2021-11-22 PROCEDURE — 81025 URINE PREGNANCY TEST: CPT | Performed by: EMERGENCY MEDICINE

## 2021-11-22 PROCEDURE — 93010 ELECTROCARDIOGRAM REPORT: CPT | Performed by: INTERNAL MEDICINE

## 2021-11-22 PROCEDURE — 83880 ASSAY OF NATRIURETIC PEPTIDE: CPT | Performed by: EMERGENCY MEDICINE

## 2021-11-22 PROCEDURE — 96375 TX/PRO/DX INJ NEW DRUG ADDON: CPT

## 2021-11-22 PROCEDURE — 93005 ELECTROCARDIOGRAM TRACING: CPT

## 2021-11-22 PROCEDURE — 99285 EMERGENCY DEPT VISIT HI MDM: CPT

## 2021-11-22 RX ORDER — ZIPRASIDONE HYDROCHLORIDE 40 MG/1
80 CAPSULE ORAL
Status: DISCONTINUED | OUTPATIENT
Start: 2021-11-22 | End: 2021-11-23 | Stop reason: HOSPADM

## 2021-11-22 RX ORDER — KETOROLAC TROMETHAMINE 30 MG/ML
15 INJECTION, SOLUTION INTRAMUSCULAR; INTRAVENOUS EVERY 6 HOURS PRN
Status: DISPENSED | OUTPATIENT
Start: 2021-11-22 | End: 2021-11-23

## 2021-11-22 RX ORDER — SODIUM CHLORIDE 9 MG/ML
75 INJECTION, SOLUTION INTRAVENOUS CONTINUOUS
Status: DISCONTINUED | OUTPATIENT
Start: 2021-11-22 | End: 2021-11-23 | Stop reason: HOSPADM

## 2021-11-22 RX ORDER — ACETAMINOPHEN 325 MG/1
650 TABLET ORAL EVERY 6 HOURS PRN
Status: DISCONTINUED | OUTPATIENT
Start: 2021-11-22 | End: 2021-11-23 | Stop reason: HOSPADM

## 2021-11-22 RX ORDER — PANTOPRAZOLE SODIUM 40 MG/1
40 TABLET, DELAYED RELEASE ORAL
Status: DISCONTINUED | OUTPATIENT
Start: 2021-11-22 | End: 2021-11-23 | Stop reason: HOSPADM

## 2021-11-22 RX ORDER — NICOTINE 21 MG/24HR
14 PATCH, TRANSDERMAL 24 HOURS TRANSDERMAL ONCE
Status: COMPLETED | OUTPATIENT
Start: 2021-11-22 | End: 2021-11-23

## 2021-11-22 RX ORDER — SODIUM CHLORIDE, SODIUM LACTATE, POTASSIUM CHLORIDE, CALCIUM CHLORIDE 600; 310; 30; 20 MG/100ML; MG/100ML; MG/100ML; MG/100ML
100 INJECTION, SOLUTION INTRAVENOUS CONTINUOUS
Status: DISCONTINUED | OUTPATIENT
Start: 2021-11-22 | End: 2021-11-22

## 2021-11-22 RX ORDER — ZIPRASIDONE HYDROCHLORIDE 40 MG/1
40 CAPSULE ORAL EVERY MORNING
Status: DISCONTINUED | OUTPATIENT
Start: 2021-11-23 | End: 2021-11-23 | Stop reason: HOSPADM

## 2021-11-22 RX ORDER — ZIPRASIDONE HYDROCHLORIDE 40 MG/1
40 CAPSULE ORAL ONCE
Status: COMPLETED | OUTPATIENT
Start: 2021-11-22 | End: 2021-11-22

## 2021-11-22 RX ORDER — METHIMAZOLE 5 MG/1
2.5 TABLET ORAL DAILY
Status: DISCONTINUED | OUTPATIENT
Start: 2021-11-22 | End: 2021-11-23 | Stop reason: HOSPADM

## 2021-11-22 RX ORDER — GUAIFENESIN 600 MG
600 TABLET, EXTENDED RELEASE 12 HR ORAL EVERY 12 HOURS SCHEDULED
Status: DISCONTINUED | OUTPATIENT
Start: 2021-11-22 | End: 2021-11-23 | Stop reason: HOSPADM

## 2021-11-22 RX ORDER — ALPRAZOLAM 0.5 MG/1
1 TABLET ORAL 2 TIMES DAILY PRN
Status: DISCONTINUED | OUTPATIENT
Start: 2021-11-22 | End: 2021-11-23 | Stop reason: HOSPADM

## 2021-11-22 RX ORDER — MAGNESIUM HYDROXIDE/ALUMINUM HYDROXICE/SIMETHICONE 120; 1200; 1200 MG/30ML; MG/30ML; MG/30ML
30 SUSPENSION ORAL EVERY 6 HOURS PRN
Status: DISCONTINUED | OUTPATIENT
Start: 2021-11-22 | End: 2021-11-23 | Stop reason: HOSPADM

## 2021-11-22 RX ORDER — DICYCLOMINE HCL 20 MG
20 TABLET ORAL 4 TIMES DAILY
Status: DISCONTINUED | OUTPATIENT
Start: 2021-11-22 | End: 2021-11-23 | Stop reason: HOSPADM

## 2021-11-22 RX ORDER — KETOROLAC TROMETHAMINE 30 MG/ML
15 INJECTION, SOLUTION INTRAMUSCULAR; INTRAVENOUS ONCE
Status: COMPLETED | OUTPATIENT
Start: 2021-11-22 | End: 2021-11-22

## 2021-11-22 RX ORDER — ONDANSETRON 2 MG/ML
4 INJECTION INTRAMUSCULAR; INTRAVENOUS EVERY 6 HOURS PRN
Status: DISCONTINUED | OUTPATIENT
Start: 2021-11-22 | End: 2021-11-23 | Stop reason: HOSPADM

## 2021-11-22 RX ORDER — FAMOTIDINE 20 MG/1
40 TABLET, FILM COATED ORAL
Status: DISCONTINUED | OUTPATIENT
Start: 2021-11-22 | End: 2021-11-23 | Stop reason: HOSPADM

## 2021-11-22 RX ADMIN — ENOXAPARIN SODIUM 40 MG: 40 INJECTION SUBCUTANEOUS at 11:39

## 2021-11-22 RX ADMIN — SODIUM CHLORIDE 75 ML/HR: 0.9 INJECTION, SOLUTION INTRAVENOUS at 11:41

## 2021-11-22 RX ADMIN — IOHEXOL 85 ML: 350 INJECTION, SOLUTION INTRAVENOUS at 06:15

## 2021-11-22 RX ADMIN — FOLIC ACID 1 MG: 5 INJECTION, SOLUTION INTRAMUSCULAR; INTRAVENOUS; SUBCUTANEOUS at 05:11

## 2021-11-22 RX ADMIN — ACETAMINOPHEN 650 MG: 325 TABLET, FILM COATED ORAL at 16:10

## 2021-11-22 RX ADMIN — GUAIFENESIN 600 MG: 600 TABLET ORAL at 21:14

## 2021-11-22 RX ADMIN — KETOROLAC TROMETHAMINE 15 MG: 30 INJECTION, SOLUTION INTRAMUSCULAR; INTRAVENOUS at 04:51

## 2021-11-22 RX ADMIN — DICYCLOMINE HYDROCHLORIDE 20 MG: 20 TABLET ORAL at 22:25

## 2021-11-22 RX ADMIN — ZIPRASIDONE HYDROCHLORIDE 40 MG: 40 CAPSULE ORAL at 14:08

## 2021-11-22 RX ADMIN — GUAIFENESIN 600 MG: 600 TABLET ORAL at 11:41

## 2021-11-22 RX ADMIN — THIAMINE HYDROCHLORIDE 100 MG: 100 INJECTION, SOLUTION INTRAMUSCULAR; INTRAVENOUS at 05:44

## 2021-11-22 RX ADMIN — SODIUM CHLORIDE 1000 ML: 0.9 INJECTION, SOLUTION INTRAVENOUS at 04:50

## 2021-11-22 RX ADMIN — DICYCLOMINE HYDROCHLORIDE 20 MG: 20 TABLET ORAL at 17:36

## 2021-11-22 RX ADMIN — ALPRAZOLAM 1 MG: 0.5 TABLET ORAL at 11:38

## 2021-11-22 RX ADMIN — KETOROLAC TROMETHAMINE 15 MG: 30 INJECTION, SOLUTION INTRAMUSCULAR; INTRAVENOUS at 13:49

## 2021-11-22 RX ADMIN — PANTOPRAZOLE SODIUM 40 MG: 40 TABLET, DELAYED RELEASE ORAL at 11:38

## 2021-11-22 RX ADMIN — Medication 14 MG: at 09:50

## 2021-11-22 RX ADMIN — KETOROLAC TROMETHAMINE 15 MG: 30 INJECTION, SOLUTION INTRAMUSCULAR; INTRAVENOUS at 20:00

## 2021-11-22 RX ADMIN — DICYCLOMINE HYDROCHLORIDE 20 MG: 20 TABLET ORAL at 11:39

## 2021-11-22 RX ADMIN — METHIMAZOLE 2.5 MG: 5 TABLET ORAL at 11:38

## 2021-11-22 RX ADMIN — CEFEPIME HYDROCHLORIDE 2000 MG: 2 INJECTION, POWDER, FOR SOLUTION INTRAVENOUS at 20:10

## 2021-11-22 RX ADMIN — CEFEPIME HYDROCHLORIDE 2000 MG: 2 INJECTION, POWDER, FOR SOLUTION INTRAVENOUS at 08:45

## 2021-11-23 VITALS
WEIGHT: 145 LBS | SYSTOLIC BLOOD PRESSURE: 107 MMHG | HEIGHT: 69 IN | HEART RATE: 61 BPM | BODY MASS INDEX: 21.48 KG/M2 | TEMPERATURE: 98.2 F | DIASTOLIC BLOOD PRESSURE: 59 MMHG | OXYGEN SATURATION: 95 % | RESPIRATION RATE: 18 BRPM

## 2021-11-23 LAB
ANION GAP SERPL CALCULATED.3IONS-SCNC: 8 MMOL/L (ref 4–13)
BASOPHILS # BLD MANUAL: 0 THOUSAND/UL (ref 0–0.1)
BASOPHILS NFR MAR MANUAL: 0 % (ref 0–1)
BUN SERPL-MCNC: 8 MG/DL (ref 5–25)
CALCIUM SERPL-MCNC: 7.8 MG/DL (ref 8.3–10.1)
CHLORIDE SERPL-SCNC: 103 MMOL/L (ref 100–108)
CO2 SERPL-SCNC: 23 MMOL/L (ref 21–32)
CREAT SERPL-MCNC: 0.92 MG/DL (ref 0.6–1.3)
EOSINOPHIL # BLD MANUAL: 0.52 THOUSAND/UL (ref 0–0.4)
EOSINOPHIL NFR BLD MANUAL: 13 % (ref 0–6)
ERYTHROCYTE [DISTWIDTH] IN BLOOD BY AUTOMATED COUNT: 13.8 % (ref 11.6–15.1)
GFR SERPL CREATININE-BSD FRML MDRD: 76 ML/MIN/1.73SQ M
GLUCOSE P FAST SERPL-MCNC: 118 MG/DL (ref 65–99)
GLUCOSE SERPL-MCNC: 118 MG/DL (ref 65–140)
HCT VFR BLD AUTO: 32.9 % (ref 34.8–46.1)
HGB BLD-MCNC: 10.6 G/DL (ref 11.5–15.4)
LYMPHOCYTES # BLD AUTO: 0.69 THOUSAND/UL (ref 0.6–4.47)
LYMPHOCYTES # BLD AUTO: 17 % (ref 14–44)
MCH RBC QN AUTO: 31 PG (ref 26.8–34.3)
MCHC RBC AUTO-ENTMCNC: 32.2 G/DL (ref 31.4–37.4)
MCV RBC AUTO: 96 FL (ref 82–98)
METAMYELOCYTES NFR BLD MANUAL: 1 % (ref 0–1)
MONOCYTES # BLD AUTO: 0.24 THOUSAND/UL (ref 0–1.22)
MONOCYTES NFR BLD: 6 % (ref 4–12)
NEUTROPHILS # BLD MANUAL: 2.5 THOUSAND/UL (ref 1.85–7.62)
NEUTS BAND NFR BLD MANUAL: 30 % (ref 0–8)
NEUTS SEG NFR BLD AUTO: 32 % (ref 43–75)
PLATELET # BLD AUTO: 170 THOUSANDS/UL (ref 149–390)
PLATELET BLD QL SMEAR: ADEQUATE
PMV BLD AUTO: 9.7 FL (ref 8.9–12.7)
POTASSIUM SERPL-SCNC: 4 MMOL/L (ref 3.5–5.3)
PROCALCITONIN SERPL-MCNC: <0.05 NG/ML
RBC # BLD AUTO: 3.42 MILLION/UL (ref 3.81–5.12)
RBC MORPH BLD: NORMAL
SODIUM SERPL-SCNC: 134 MMOL/L (ref 136–145)
VARIANT LYMPHS # BLD AUTO: 1 %
WBC # BLD AUTO: 4.03 THOUSAND/UL (ref 4.31–10.16)

## 2021-11-23 PROCEDURE — 80048 BASIC METABOLIC PNL TOTAL CA: CPT | Performed by: STUDENT IN AN ORGANIZED HEALTH CARE EDUCATION/TRAINING PROGRAM

## 2021-11-23 PROCEDURE — 85027 COMPLETE CBC AUTOMATED: CPT | Performed by: STUDENT IN AN ORGANIZED HEALTH CARE EDUCATION/TRAINING PROGRAM

## 2021-11-23 PROCEDURE — 99217 PR OBSERVATION CARE DISCHARGE MANAGEMENT: CPT | Performed by: INTERNAL MEDICINE

## 2021-11-23 PROCEDURE — 84145 PROCALCITONIN (PCT): CPT | Performed by: EMERGENCY MEDICINE

## 2021-11-23 PROCEDURE — 85007 BL SMEAR W/DIFF WBC COUNT: CPT | Performed by: STUDENT IN AN ORGANIZED HEALTH CARE EDUCATION/TRAINING PROGRAM

## 2021-11-23 RX ORDER — DOXYCYCLINE 100 MG/1
100 TABLET ORAL 2 TIMES DAILY
Qty: 14 TABLET | Refills: 0 | Status: SHIPPED | OUTPATIENT
Start: 2021-11-23 | End: 2021-11-30

## 2021-11-23 RX ORDER — GUAIFENESIN 600 MG
600 TABLET, EXTENDED RELEASE 12 HR ORAL EVERY 12 HOURS SCHEDULED
Qty: 14 TABLET | Refills: 0 | Status: SHIPPED | OUTPATIENT
Start: 2021-11-23

## 2021-11-23 RX ADMIN — ENOXAPARIN SODIUM 40 MG: 40 INJECTION SUBCUTANEOUS at 08:42

## 2021-11-23 RX ADMIN — DICYCLOMINE HYDROCHLORIDE 20 MG: 20 TABLET ORAL at 11:54

## 2021-11-23 RX ADMIN — ACETAMINOPHEN 650 MG: 325 TABLET, FILM COATED ORAL at 04:12

## 2021-11-23 RX ADMIN — SODIUM CHLORIDE 75 ML/HR: 0.9 INJECTION, SOLUTION INTRAVENOUS at 05:41

## 2021-11-23 RX ADMIN — METHIMAZOLE 2.5 MG: 5 TABLET ORAL at 08:44

## 2021-11-23 RX ADMIN — DICYCLOMINE HYDROCHLORIDE 20 MG: 20 TABLET ORAL at 08:44

## 2021-11-23 RX ADMIN — GUAIFENESIN 600 MG: 600 TABLET ORAL at 08:44

## 2021-11-23 RX ADMIN — KETOROLAC TROMETHAMINE 15 MG: 30 INJECTION, SOLUTION INTRAMUSCULAR; INTRAVENOUS at 02:09

## 2021-11-23 RX ADMIN — CEFEPIME HYDROCHLORIDE 2000 MG: 2 INJECTION, POWDER, FOR SOLUTION INTRAVENOUS at 08:43

## 2021-11-23 RX ADMIN — ZIPRASIDONE HYDROCHLORIDE 80 MG: 40 CAPSULE ORAL at 03:11

## 2021-11-23 RX ADMIN — ZIPRASIDONE HYDROCHLORIDE 40 MG: 40 CAPSULE ORAL at 09:45

## 2021-11-23 RX ADMIN — PANTOPRAZOLE SODIUM 40 MG: 40 TABLET, DELAYED RELEASE ORAL at 06:37

## 2021-11-27 LAB
BACTERIA BLD CULT: NORMAL

## 2021-11-29 ENCOUNTER — APPOINTMENT (OUTPATIENT)
Dept: LAB | Facility: HOSPITAL | Age: 46
End: 2021-11-29
Attending: INTERNAL MEDICINE
Payer: COMMERCIAL

## 2021-11-29 DIAGNOSIS — J18.9 MULTIFOCAL PNEUMONIA: ICD-10-CM

## 2021-11-29 LAB
ALBUMIN SERPL BCP-MCNC: 3.5 G/DL (ref 3.5–5)
ALP SERPL-CCNC: 122 U/L (ref 46–116)
ALT SERPL W P-5'-P-CCNC: 28 U/L (ref 12–78)
ANION GAP SERPL CALCULATED.3IONS-SCNC: 9 MMOL/L (ref 4–13)
AST SERPL W P-5'-P-CCNC: 18 U/L (ref 5–45)
BILIRUB SERPL-MCNC: 0.25 MG/DL (ref 0.2–1)
BUN SERPL-MCNC: 6 MG/DL (ref 5–25)
CALCIUM SERPL-MCNC: 8.8 MG/DL (ref 8.3–10.1)
CHLORIDE SERPL-SCNC: 101 MMOL/L (ref 100–108)
CO2 SERPL-SCNC: 27 MMOL/L (ref 21–32)
CREAT SERPL-MCNC: 0.86 MG/DL (ref 0.6–1.3)
ERYTHROCYTE [DISTWIDTH] IN BLOOD BY AUTOMATED COUNT: 13.8 % (ref 11.6–15.1)
GFR SERPL CREATININE-BSD FRML MDRD: 82 ML/MIN/1.73SQ M
GLUCOSE SERPL-MCNC: 115 MG/DL (ref 65–140)
HCT VFR BLD AUTO: 39 % (ref 34.8–46.1)
HGB BLD-MCNC: 12.4 G/DL (ref 11.5–15.4)
MCH RBC QN AUTO: 30.9 PG (ref 26.8–34.3)
MCHC RBC AUTO-ENTMCNC: 31.8 G/DL (ref 31.4–37.4)
MCV RBC AUTO: 97 FL (ref 82–98)
PLATELET # BLD AUTO: 455 THOUSANDS/UL (ref 149–390)
PMV BLD AUTO: 9.4 FL (ref 8.9–12.7)
POTASSIUM SERPL-SCNC: 4.3 MMOL/L (ref 3.5–5.3)
PROT SERPL-MCNC: 7.4 G/DL (ref 6.4–8.2)
RBC # BLD AUTO: 4.01 MILLION/UL (ref 3.81–5.12)
SODIUM SERPL-SCNC: 137 MMOL/L (ref 136–145)
WBC # BLD AUTO: 10.82 THOUSAND/UL (ref 4.31–10.16)

## 2021-11-29 PROCEDURE — 80053 COMPREHEN METABOLIC PANEL: CPT

## 2021-11-29 PROCEDURE — 36415 COLL VENOUS BLD VENIPUNCTURE: CPT

## 2021-11-29 PROCEDURE — 85027 COMPLETE CBC AUTOMATED: CPT

## 2022-08-18 ENCOUNTER — HOSPITAL ENCOUNTER (EMERGENCY)
Facility: HOSPITAL | Age: 47
Discharge: HOME | End: 2022-08-18
Attending: EMERGENCY MEDICINE
Payer: MEDICARE

## 2022-08-18 ENCOUNTER — APPOINTMENT (EMERGENCY)
Dept: CT IMAGING | Facility: HOSPITAL | Age: 47
End: 2022-08-18
Payer: MEDICARE

## 2022-08-18 VITALS
RESPIRATION RATE: 18 BRPM | TEMPERATURE: 98.5 F | HEART RATE: 50 BPM | SYSTOLIC BLOOD PRESSURE: 120 MMHG | OXYGEN SATURATION: 99 % | DIASTOLIC BLOOD PRESSURE: 67 MMHG | BODY MASS INDEX: 20.64 KG/M2 | WEIGHT: 139.77 LBS

## 2022-08-18 DIAGNOSIS — F11.93 NARCOTIC WITHDRAWAL (HCC): ICD-10-CM

## 2022-08-18 DIAGNOSIS — R41.3 MEMORY LOSS: Primary | ICD-10-CM

## 2022-08-18 LAB
ALBUMIN SERPL BCP-MCNC: 3.5 G/DL (ref 3.5–5)
ALP SERPL-CCNC: 113 U/L (ref 46–116)
ALT SERPL W P-5'-P-CCNC: 20 U/L (ref 12–78)
ANION GAP SERPL CALCULATED.3IONS-SCNC: 9 MMOL/L (ref 4–13)
AST SERPL W P-5'-P-CCNC: 16 U/L (ref 5–45)
ATRIAL RATE: 53 BPM
BACTERIA UR QL AUTO: ABNORMAL /HPF
BASOPHILS # BLD AUTO: 0.03 THOUSANDS/ΜL (ref 0–0.1)
BASOPHILS NFR BLD AUTO: 0 % (ref 0–1)
BILIRUB SERPL-MCNC: 0.46 MG/DL (ref 0.2–1)
BILIRUB UR QL STRIP: NEGATIVE
BUN SERPL-MCNC: 9 MG/DL (ref 5–25)
CALCIUM SERPL-MCNC: 9.2 MG/DL (ref 8.3–10.1)
CARDIAC TROPONIN I PNL SERPL HS: 3 NG/L
CHLORIDE SERPL-SCNC: 100 MMOL/L (ref 96–108)
CLARITY UR: CLEAR
CO2 SERPL-SCNC: 26 MMOL/L (ref 21–32)
COLOR UR: YELLOW
CREAT SERPL-MCNC: 0.78 MG/DL (ref 0.6–1.3)
EOSINOPHIL # BLD AUTO: 0 THOUSAND/ΜL (ref 0–0.61)
EOSINOPHIL NFR BLD AUTO: 0 % (ref 0–6)
ERYTHROCYTE [DISTWIDTH] IN BLOOD BY AUTOMATED COUNT: 13.5 % (ref 11.6–15.1)
GFR SERPL CREATININE-BSD FRML MDRD: 92 ML/MIN/1.73SQ M
GLUCOSE SERPL-MCNC: 124 MG/DL (ref 65–140)
GLUCOSE UR STRIP-MCNC: NEGATIVE MG/DL
HCT VFR BLD AUTO: 39.7 % (ref 34.8–46.1)
HGB BLD-MCNC: 13.3 G/DL (ref 11.5–15.4)
HGB UR QL STRIP.AUTO: ABNORMAL
IMM GRANULOCYTES # BLD AUTO: 0.03 THOUSAND/UL (ref 0–0.2)
IMM GRANULOCYTES NFR BLD AUTO: 0 % (ref 0–2)
KETONES UR STRIP-MCNC: NEGATIVE MG/DL
LEUKOCYTE ESTERASE UR QL STRIP: NEGATIVE
LYMPHOCYTES # BLD AUTO: 1.05 THOUSANDS/ΜL (ref 0.6–4.47)
LYMPHOCYTES NFR BLD AUTO: 11 % (ref 14–44)
MCH RBC QN AUTO: 31.1 PG (ref 26.8–34.3)
MCHC RBC AUTO-ENTMCNC: 33.5 G/DL (ref 31.4–37.4)
MCV RBC AUTO: 93 FL (ref 82–98)
MONOCYTES # BLD AUTO: 0.35 THOUSAND/ΜL (ref 0.17–1.22)
MONOCYTES NFR BLD AUTO: 4 % (ref 4–12)
MUCOUS THREADS UR QL AUTO: ABNORMAL
NEUTROPHILS # BLD AUTO: 8.5 THOUSANDS/ΜL (ref 1.85–7.62)
NEUTS SEG NFR BLD AUTO: 85 % (ref 43–75)
NITRITE UR QL STRIP: NEGATIVE
NON-SQ EPI CELLS URNS QL MICRO: ABNORMAL /HPF
NRBC BLD AUTO-RTO: 0 /100 WBCS
P AXIS: -87 DEGREES
PH UR STRIP.AUTO: 7 [PH] (ref 4.5–8)
PLATELET # BLD AUTO: 242 THOUSANDS/UL (ref 149–390)
PMV BLD AUTO: 9.9 FL (ref 8.9–12.7)
POTASSIUM SERPL-SCNC: 4 MMOL/L (ref 3.5–5.3)
PR INTERVAL: 128 MS
PROT SERPL-MCNC: 7.6 G/DL (ref 6.4–8.4)
PROT UR STRIP-MCNC: NEGATIVE MG/DL
QRS AXIS: 50 DEGREES
QRSD INTERVAL: 82 MS
QT INTERVAL: 444 MS
QTC INTERVAL: 416 MS
RBC # BLD AUTO: 4.27 MILLION/UL (ref 3.81–5.12)
RBC #/AREA URNS AUTO: ABNORMAL /HPF
SODIUM SERPL-SCNC: 135 MMOL/L (ref 135–147)
SP GR UR STRIP.AUTO: 1.02 (ref 1–1.03)
T WAVE AXIS: 89 DEGREES
TSH SERPL DL<=0.05 MIU/L-ACNC: 0.03 UIU/ML (ref 0.45–4.5)
UROBILINOGEN UR QL STRIP.AUTO: 0.2 E.U./DL
VENTRICULAR RATE: 53 BPM
WBC # BLD AUTO: 9.96 THOUSAND/UL (ref 4.31–10.16)
WBC #/AREA URNS AUTO: ABNORMAL /HPF

## 2022-08-18 PROCEDURE — 99284 EMERGENCY DEPT VISIT MOD MDM: CPT

## 2022-08-18 PROCEDURE — 36415 COLL VENOUS BLD VENIPUNCTURE: CPT | Performed by: EMERGENCY MEDICINE

## 2022-08-18 PROCEDURE — 99285 EMERGENCY DEPT VISIT HI MDM: CPT | Performed by: EMERGENCY MEDICINE

## 2022-08-18 PROCEDURE — 93005 ELECTROCARDIOGRAM TRACING: CPT

## 2022-08-18 PROCEDURE — 80053 COMPREHEN METABOLIC PANEL: CPT | Performed by: EMERGENCY MEDICINE

## 2022-08-18 PROCEDURE — 93010 ELECTROCARDIOGRAM REPORT: CPT | Performed by: INTERNAL MEDICINE

## 2022-08-18 PROCEDURE — 70450 CT HEAD/BRAIN W/O DYE: CPT

## 2022-08-18 PROCEDURE — 84443 ASSAY THYROID STIM HORMONE: CPT | Performed by: EMERGENCY MEDICINE

## 2022-08-18 PROCEDURE — 84484 ASSAY OF TROPONIN QUANT: CPT | Performed by: EMERGENCY MEDICINE

## 2022-08-18 PROCEDURE — G1004 CDSM NDSC: HCPCS

## 2022-08-18 PROCEDURE — 85025 COMPLETE CBC W/AUTO DIFF WBC: CPT | Performed by: EMERGENCY MEDICINE

## 2022-08-18 PROCEDURE — 81001 URINALYSIS AUTO W/SCOPE: CPT

## 2022-08-18 RX ORDER — MULTIVITAMIN
1 TABLET ORAL DAILY
COMMUNITY

## 2022-08-18 RX ORDER — ALPRAZOLAM 1 MG/1
1 TABLET ORAL 4 TIMES DAILY PRN
COMMUNITY

## 2022-10-12 PROBLEM — J18.9 MULTIFOCAL PNEUMONIA: Status: RESOLVED | Noted: 2021-11-22 | Resolved: 2022-10-12

## 2023-03-07 ENCOUNTER — APPOINTMENT (EMERGENCY)
Dept: RADIOLOGY | Facility: HOSPITAL | Age: 48
End: 2023-03-07

## 2023-03-07 ENCOUNTER — HOSPITAL ENCOUNTER (EMERGENCY)
Facility: HOSPITAL | Age: 48
Discharge: HOME | End: 2023-03-07
Attending: EMERGENCY MEDICINE

## 2023-03-07 VITALS
RESPIRATION RATE: 18 BRPM | OXYGEN SATURATION: 98 % | SYSTOLIC BLOOD PRESSURE: 144 MMHG | DIASTOLIC BLOOD PRESSURE: 95 MMHG | TEMPERATURE: 98.2 F | HEART RATE: 66 BPM

## 2023-03-07 DIAGNOSIS — K21.9 GASTRIC REFLUX: Primary | ICD-10-CM

## 2023-03-07 DIAGNOSIS — R07.9 CHEST PAIN: ICD-10-CM

## 2023-03-07 LAB
ANION GAP SERPL CALCULATED.3IONS-SCNC: 5 MMOL/L (ref 4–13)
BASOPHILS # BLD AUTO: 0.1 THOUSANDS/ÂΜL (ref 0–0.1)
BASOPHILS NFR BLD AUTO: 1 % (ref 0–1)
BUN SERPL-MCNC: 10 MG/DL (ref 5–25)
CALCIUM SERPL-MCNC: 8.5 MG/DL (ref 8.4–10.2)
CARDIAC TROPONIN I PNL SERPL HS: 2 NG/L
CHLORIDE SERPL-SCNC: 106 MMOL/L (ref 96–108)
CO2 SERPL-SCNC: 24 MMOL/L (ref 21–32)
CREAT SERPL-MCNC: 0.89 MG/DL (ref 0.6–1.3)
EOSINOPHIL # BLD AUTO: 0.48 THOUSAND/ÂΜL (ref 0–0.61)
EOSINOPHIL NFR BLD AUTO: 6 % (ref 0–6)
ERYTHROCYTE [DISTWIDTH] IN BLOOD BY AUTOMATED COUNT: 13.8 % (ref 11.6–15.1)
GFR SERPL CREATININE-BSD FRML MDRD: 78 ML/MIN/1.73SQ M
GLUCOSE SERPL-MCNC: 139 MG/DL (ref 65–140)
HCT VFR BLD AUTO: 37.8 % (ref 34.8–46.1)
HGB BLD-MCNC: 12.2 G/DL (ref 11.5–15.4)
IMM GRANULOCYTES # BLD AUTO: 0.01 THOUSAND/UL (ref 0–0.2)
IMM GRANULOCYTES NFR BLD AUTO: 0 % (ref 0–2)
LYMPHOCYTES # BLD AUTO: 2.31 THOUSANDS/ÂΜL (ref 0.6–4.47)
LYMPHOCYTES NFR BLD AUTO: 29 % (ref 14–44)
MCH RBC QN AUTO: 31.3 PG (ref 26.8–34.3)
MCHC RBC AUTO-ENTMCNC: 32.3 G/DL (ref 31.4–37.4)
MCV RBC AUTO: 97 FL (ref 82–98)
MONOCYTES # BLD AUTO: 0.49 THOUSAND/ÂΜL (ref 0.17–1.22)
MONOCYTES NFR BLD AUTO: 6 % (ref 4–12)
NEUTROPHILS # BLD AUTO: 4.63 THOUSANDS/ÂΜL (ref 1.85–7.62)
NEUTS SEG NFR BLD AUTO: 58 % (ref 43–75)
NRBC BLD AUTO-RTO: 0 /100 WBCS
PLATELET # BLD AUTO: 263 THOUSANDS/UL (ref 149–390)
PMV BLD AUTO: 9.2 FL (ref 8.9–12.7)
POTASSIUM SERPL-SCNC: 4.9 MMOL/L (ref 3.5–5.3)
RBC # BLD AUTO: 3.9 MILLION/UL (ref 3.81–5.12)
SODIUM SERPL-SCNC: 135 MMOL/L (ref 135–147)
TSH SERPL DL<=0.05 MIU/L-ACNC: 1.62 UIU/ML (ref 0.45–4.5)
WBC # BLD AUTO: 8.02 THOUSAND/UL (ref 4.31–10.16)

## 2023-03-07 RX ORDER — FAMOTIDINE 20 MG/1
20 TABLET, FILM COATED ORAL ONCE
Status: COMPLETED | OUTPATIENT
Start: 2023-03-07 | End: 2023-03-07

## 2023-03-07 RX ORDER — SODIUM CHLORIDE 9 MG/ML
3 INJECTION INTRAVENOUS
Status: DISCONTINUED | OUTPATIENT
Start: 2023-03-07 | End: 2023-03-07 | Stop reason: HOSPADM

## 2023-03-07 RX ORDER — ONDANSETRON 2 MG/ML
4 INJECTION INTRAMUSCULAR; INTRAVENOUS ONCE
Status: COMPLETED | OUTPATIENT
Start: 2023-03-07 | End: 2023-03-07

## 2023-03-07 RX ORDER — MAGNESIUM HYDROXIDE/ALUMINUM HYDROXICE/SIMETHICONE 120; 1200; 1200 MG/30ML; MG/30ML; MG/30ML
30 SUSPENSION ORAL ONCE
Status: COMPLETED | OUTPATIENT
Start: 2023-03-07 | End: 2023-03-07

## 2023-03-07 RX ADMIN — FAMOTIDINE 20 MG: 20 TABLET ORAL at 20:01

## 2023-03-07 RX ADMIN — ONDANSETRON HYDROCHLORIDE 4 MG: 2 SOLUTION INTRAMUSCULAR; INTRAVENOUS at 19:58

## 2023-03-07 RX ADMIN — ALUMINUM HYDROXIDE, MAGNESIUM HYDROXIDE, AND DIMETHICONE 30 ML: 200; 20; 200 SUSPENSION ORAL at 20:02

## 2023-03-08 LAB
ATRIAL RATE: 60 BPM
ATRIAL RATE: 67 BPM
P AXIS: 47 DEGREES
P AXIS: 56 DEGREES
PR INTERVAL: 134 MS
PR INTERVAL: 140 MS
QRS AXIS: 37 DEGREES
QRS AXIS: 63 DEGREES
QRSD INTERVAL: 90 MS
QRSD INTERVAL: 90 MS
QT INTERVAL: 436 MS
QT INTERVAL: 462 MS
QTC INTERVAL: 460 MS
QTC INTERVAL: 462 MS
T WAVE AXIS: 28 DEGREES
T WAVE AXIS: 61 DEGREES
VENTRICULAR RATE: 60 BPM
VENTRICULAR RATE: 67 BPM

## 2023-03-08 NOTE — ED PROVIDER NOTES
History  Chief Complaint   Patient presents with   • Medical Problem     Patient states she woke up a few days ago with baggy under eyes  Patient states later on, the patient experienced chest pain for a couple of seconds  Patient states she has a hx of heart burn and indigestion but the sharp pain felt different  Patient c/o nausea and indigestion at this time  HPI  44-year-old male presents with complaints of chest pain on Sunday  Patient states that she had chest pain on Sunday that was suddenly lasted a couple of seconds and resolved and she has not had it since  Patient has a history of GERD and states that she has had bad indigestion for the last 2 days  She is coming to the emergency department for checkup  Denies any vomiting, fevers, chills, body aches, chest pain currently, shortness of breath  She endorses current anxiety and nausea  Prior to Admission Medications   Prescriptions Last Dose Informant Patient Reported? Taking?    ALPRAZolam (XANAX) 1 mg tablet   Yes No   Sig: Take 1 mg by mouth 4 (four) times a day as needed for anxiety   Multiple Vitamin (multivitamin) tablet   Yes No   Sig: Take 1 tablet by mouth daily   aluminum-magnesium hydroxide-simethicone (MAALOX MAX) 400-400-40 MG/5ML suspension   No No   Sig: Take 10 mL by mouth every 6 (six) hours as needed for indigestion or heartburn (nausea)   famotidine (PEPCID) 40 MG tablet   No No   Sig: Take 1 tablet (40 mg total) by mouth daily at bedtime as needed for heartburn   guaiFENesin (MUCINEX) 600 mg 12 hr tablet   No No   Sig: Take 1 tablet (600 mg total) by mouth every 12 (twelve) hours   ibuprofen (MOTRIN) 600 mg tablet   No No   Sig: Take 1 tablet (600 mg total) by mouth every 6 (six) hours as needed for mild pain   magnesium oxide (MAG-OX) 400 mg   No No   Sig: Take 2 tablets (800 mg total) by mouth 2 (two) times a day for 7 days   methimazole (TAPAZOLE) 5 mg tablet   Yes No   Sig: Take 2 5 mg by mouth daily   Patient not taking: Reported on 8/18/2022   mirtazapine (REMERON SOL-TAB) 30 mg dispersible tablet   Yes No   Sig: Take 30 mg by mouth   Patient not taking: Reported on 8/18/2022   omeprazole (PriLOSEC) 40 MG capsule   Yes No   ziprasidone (GEODON) 80 mg capsule   Yes No   Sig: Take 80 mg by mouth daily at bedtime   ziprasidone (GEODON) 80 mg capsule   Yes No   Sig: Take 40 mg by mouth every morning      Facility-Administered Medications: None       Past Medical History:   Diagnosis Date   • Cardiomyopathy ([facility] [address] )    • Elevated transaminase measurement    • Irritable bowel syndrome (IBS)     with Constipation   • Nutritional deficiency    • Overactive thyroid gland    • Palpitations    • Sweating abnormality    • Vaginal infection    • Vitamin D insufficiency        Past Surgical History:   Procedure Laterality Date   • ANKLE SURGERY Left    • FINGER SURGERY     • TUBAL LIGATION     • UPPER GASTROINTESTINAL ENDOSCOPY         Family History   Problem Relation Age of Onset   • No Known Problems Mother    • Diabetes Father    • Cancer Father      I have reviewed and agree with the history as documented  E-Cigarette/Vaping   • E-Cigarette Use Never User      E-Cigarette/Vaping Substances     Social History     Tobacco Use   • Smoking status: Every Day     Packs/day: 0 50     Years: 30 00     Pack years: 15 00     Types: Cigarettes   • Smokeless tobacco: Never   Vaping Use   • Vaping Use: Never used   Substance Use Topics   • Alcohol use: Yes     Comment: occasionally   • Drug use: Yes     Types: Cocaine, Heroin     Comment: Last use today        Review of Systems   Constitutional: Negative for chills and fever  HENT: Negative for congestion, ear pain, rhinorrhea and sore throat  Eyes: Negative for pain  Respiratory: Negative for apnea, cough, choking, chest tightness, shortness of breath, wheezing and stridor  Cardiovascular: Negative for chest pain, palpitations and leg swelling  Gastrointestinal: Positive for nausea  Negative for abdominal pain, constipation, diarrhea and vomiting  Genitourinary: Negative for hematuria  Musculoskeletal: Negative for arthralgias and back pain  Skin: Negative for rash and wound  Neurological: Negative for dizziness  Psychiatric/Behavioral: Negative for agitation and hallucinations  All other systems reviewed and are negative  Physical Exam  ED Triage Vitals [03/07/23 1850]   Temperature Pulse Respirations Blood Pressure SpO2   98 2 °F (36 8 °C) 66 18 144/95 98 %      Temp Source Heart Rate Source Patient Position - Orthostatic VS BP Location FiO2 (%)   Oral Monitor Lying Left arm --      Pain Score       No Pain             Orthostatic Vital Signs  Vitals:    03/07/23 1850   BP: 144/95   Pulse: 66   Patient Position - Orthostatic VS: Lying       Physical Exam  Vitals reviewed  Constitutional:       General: She is not in acute distress  Appearance: She is well-developed  HENT:      Head: Normocephalic and atraumatic  Right Ear: External ear normal       Left Ear: External ear normal       Nose: Nose normal  No congestion or rhinorrhea  Mouth/Throat:      Mouth: Mucous membranes are moist       Pharynx: No oropharyngeal exudate or posterior oropharyngeal erythema  Eyes:      General:         Right eye: No discharge  Left eye: No discharge  Extraocular Movements: Extraocular movements intact  Conjunctiva/sclera: Conjunctivae normal       Pupils: Pupils are equal, round, and reactive to light  Cardiovascular:      Rate and Rhythm: Normal rate and regular rhythm  Pulses: Normal pulses  Heart sounds: Normal heart sounds  Pulmonary:      Effort: Pulmonary effort is normal  No respiratory distress  Breath sounds: Normal breath sounds  No wheezing or rales  Abdominal:      Palpations: Abdomen is soft  Tenderness: There is no abdominal tenderness  Musculoskeletal:         General: No tenderness  Normal range of motion  Cervical back: Normal range of motion and neck supple  No rigidity  Skin:     General: Skin is warm  Findings: No erythema or rash  Neurological:      General: No focal deficit present  Mental Status: She is alert and oriented to person, place, and time  Cranial Nerves: No cranial nerve deficit  Sensory: No sensory deficit  Motor: No weakness  Coordination: Coordination normal    Psychiatric:         Mood and Affect: Mood normal          ED Medications  Medications   famotidine (PEPCID) tablet 20 mg (20 mg Oral Given 3/7/23 2001)   aluminum-magnesium hydroxide-simethicone (MYLANTA) oral suspension 30 mL (30 mL Oral Given 3/7/23 2002)   ondansetron (ZOFRAN) injection 4 mg (4 mg Intravenous Given 3/7/23 1958)       Diagnostic Studies  Results Reviewed     Procedure Component Value Units Date/Time    TSH, 3rd generation with Free T4 reflex [[id number]]  (Normal) Collected: 03/07/23 2002    Lab Status: Final result Specimen: Blood from Arm, Right Updated: 03/07/23 2123     TSH 3RD GENERATON 1 618 uIU/mL     Narrative:      Patients undergoing fluorescein dye angiography may retain small amounts of fluorescein in the body for 48-72 hours post procedure  Samples containing fluorescein can produce falsely depressed TSH values  If the patient had this procedure,a specimen should be resubmitted post fluorescein clearance        HS Troponin 0hr (reflex protocol) [[id number]]  (Normal) Collected: 03/07/23 2002    Lab Status: Final result Specimen: Blood from Arm, Right Updated: 03/07/23 2030     hs TnI 0hr 2 ng/L     Basic metabolic panel [[id number]] Collected: 03/07/23 2002    Lab Status: Final result Specimen: Blood from Arm, Right Updated: 03/07/23 2023     Sodium 135 mmol/L      Potassium 4 9 mmol/L      Chloride 106 mmol/L      CO2 24 mmol/L      ANION GAP 5 mmol/L      BUN 10 mg/dL      Creatinine 0 89 mg/dL      Glucose 139 mg/dL      Calcium 8 5 mg/dL      eGFR 78 ml/min/1 73sq m Narrative:      National Kidney Disease Foundation guidelines for Chronic Kidney Disease (CKD):   •  Stage 1 with normal or high GFR (GFR > 90 mL/min/1 73 square meters)  •  Stage 2 Mild CKD (GFR = 60-89 mL/min/1 73 square meters)  •  Stage 3A Moderate CKD (GFR = 45-59 mL/min/1 73 square meters)  •  Stage 3B Moderate CKD (GFR = 30-44 mL/min/1 73 square meters)  •  Stage 4 Severe CKD (GFR = 15-29 mL/min/1 73 square meters)  •  Stage 5 End Stage CKD (GFR <15 mL/min/1 73 square meters)  Note: GFR calculation is accurate only with a steady state creatinine    CBC and differential [[id number]] Collected: 03/07/23 2002    Lab Status: Final result Specimen: Blood from Arm, Right Updated: 03/07/23 2007     WBC 8 02 Thousand/uL      RBC 3 90 Million/uL      Hemoglobin 12 2 g/dL      Hematocrit 37 8 %      MCV 97 fL      MCH 31 3 pg      MCHC 32 3 g/dL      RDW 13 8 %      MPV 9 2 fL      Platelets 107 Thousands/uL      nRBC 0 /100 WBCs      Neutrophils Relative 58 %      Immat GRANS % 0 %      Lymphocytes Relative 29 %      Monocytes Relative 6 %      Eosinophils Relative 6 %      Basophils Relative 1 %      Neutrophils Absolute 4 63 Thousands/µL      Immature Grans Absolute 0 01 Thousand/uL      Lymphocytes Absolute 2 31 Thousands/µL      Monocytes Absolute 0 49 Thousand/µL      Eosinophils Absolute 0 48 Thousand/µL      Basophils Absolute 0 10 Thousands/µL                  X-ray chest 1 view portable   Final Result by Mallory Carter MD (03/08 0700)      No acute cardiopulmonary disease        Previous right basal infiltrate has resolved            Workstation performed: [id number]               Procedures  ECG 12 Lead Documentation Only    Date/Time: 3/11/2023 1:41 PM  Performed by: Leticia Kim DO  Authorized by: Leticia Kim DO     ECG reviewed by me, the ED Provider: yes    Patient location:  ED  Interpretation:     Interpretation: normal    Rate:     ECG rate:  60    ECG rate assessment: normal    Rhythm: Rhythm: sinus rhythm    Ectopy:     Ectopy: none    QRS:     QRS axis:  Normal  Conduction:     Conduction: normal    ST segments:     ST segments:  Normal  T waves:     T waves: normal            ED Course                             SBIRT 20yo+    Flowsheet Row Most Recent Value   SBIRT (25 yo +)    In order to provide better care to our patients, we are screening all of our patients for alcohol and drug use  Would it be okay to ask you these screening questions? Unable to answer at this time Filed at: 03/07/2023 Ninfa Meek Making  33-year-old male presents with complaints of chest pain on Sunday  Gastric reflux  Patient given medicines here in the emergency department has relief  Negative cardiac work-up  Patient is discharged given follow-up and return precautions  Amount and/or Complexity of Data Reviewed  Labs: ordered  Radiology: ordered  Risk  OTC drugs  Prescription drug management  Disposition  Final diagnoses:   Gastric reflux   Chest pain     Time reflects when diagnosis was documented in both MDM as applicable and the Disposition within this note     Time User Action Codes Description Comment    3/7/2023  8:48 PM Terrence Arreguin Add [K21 9] Gastric reflux     3/7/2023  8:48 PM Terrence Arreguin Add [R07 9] Chest pain       ED Disposition     ED Disposition   Discharge    Condition   Stable    Date/Time   Tue Mar 7, 2023  8:47 PM    Comment   Junior Vanegas discharge to home/self care                 Follow-up Information     Follow up With Specialties Details Why Contact Info Additional [address] Emergency Department Emergency Medicine Go to  If symptoms worsen or if you have additional concerns [place] [id number]  [address] [place] [address] Emergency Department, [address] [place] [address] [facility] [place] [address]  [phone]             Discharge Medication List as of 3/7/2023  8:48 PM      CONTINUE these medications which have NOT CHANGED    Details   ALPRAZolam (XANAX) 1 mg tablet Take 1 mg by mouth 4 (four) times a day as needed for anxiety, Historical Med      aluminum-magnesium hydroxide-simethicone (MAALOX MAX) 400-400-40 MG/5ML suspension Take 10 mL by mouth every 6 (six) hours as needed for indigestion or heartburn (nausea), Starting Thu 2/4/2021, Normal      famotidine (PEPCID) 40 MG tablet Take 1 tablet (40 mg total) by mouth daily at bedtime as needed for heartburn, Starting Fri 5/29/2020, Normal      guaiFENesin (MUCINEX) 600 mg 12 hr tablet Take 1 tablet (600 mg total) by mouth every 12 (twelve) hours, Starting Tue 11/23/2021, Normal      ibuprofen (MOTRIN) 600 mg tablet Take 1 tablet (600 mg total) by mouth every 6 (six) hours as needed for mild pain, Starting Tue 2/18/2020, Normal      magnesium oxide (MAG-OX) 400 mg Take 2 tablets (800 mg total) by mouth 2 (two) times a day for 7 days, Starting Fri 6/18/2021, Until Fri 6/25/2021, Normal      methimazole (TAPAZOLE) 5 mg tablet Take 2 5 mg by mouth daily, Starting Thu 7/29/2021, Historical Med      mirtazapine (REMERON SOL-TAB) 30 mg dispersible tablet Take 30 mg by mouth, Starting Thu 8/8/2019, Historical Med      Multiple Vitamin (multivitamin) tablet Take 1 tablet by mouth daily, Historical Med      omeprazole (PriLOSEC) 40 MG capsule Starting Thu 9/2/2021, Historical Med      !! ziprasidone (GEODON) 80 mg capsule Take 80 mg by mouth daily at bedtime, Starting Thu 8/8/2019, Historical Med      !! ziprasidone (GEODON) 80 mg capsule Take 40 mg by mouth every morning, Starting Thu 8/12/2021, Historical Med       !! - Potential duplicate medications found  Please discuss with provider  No discharge procedures on file  PDMP Review     None           ED Provider  Attending physically available and evaluated Marek Magallanes   I managed the patient along with the ED Attending      Electronically Signed by         Alley Lino DO  03/11/23 7574

## 2023-03-08 NOTE — ED ATTENDING ATTESTATION
3/7/2023  I, Erleen Fabry, MD, saw and evaluated the patient  I have discussed the patient with the resident/non-physician practitioner and agree with the resident's/non-physician practitioner's findings, Plan of Care, and MDM as documented in the resident's/non-physician practitioner's note, except where noted  All available labs and Radiology studies were reviewed  I was present for key portions of any procedure(s) performed by the resident/non-physician practitioner and I was immediately available to provide assistance  At this point I agree with the current assessment done in the Emergency Department  I have conducted an independent evaluation of this patient a history and physical is as follows:  Patient is a 40 yo female, hx of Grave's disease, GERD, had Chest pain 2 days back, described as sharp, raidated to left side, lasted for few seconds, resolved, no recurrent chest pain, no fever, chills, dyspnea, abd pain, V/D; also noticed bag under eyes yesterday which have resolved; patient just wanted to get checked out to see she is not having heart attack  Patient is conscious, alert, vital signs stable, no acute distress, lungs are clear to auscultation with bilateral equal air entry, heart sounds are regular, pulses are equal bilaterally, abdomen is soft, nontender, nondistended, no peripheral edema, no calf tenderness or swelling  Differential diagnosis: Nonspecific/atypical chest pain, GERD, we will check cardiac work-up including labs, EKG, chest x-ray to rule out acute abnormality  ED Course     Labs, EKG, chest x-ray reviewed, no significant acute abnormality noted  Stable for discharge, advised follow-up with PCP      Critical Care Time  Procedures

## 2023-07-27 ENCOUNTER — TELEPHONE (OUTPATIENT)
Dept: OTHER | Age: 48
End: 2023-07-27

## 2023-07-27 NOTE — TELEPHONE ENCOUNTER
Received a message from [address] [place] [address] regarding a referral  To the MAT/SHARE Program received via the [facility] [employer]. Called Hawa Moreland and left a VM to call us back. MAT office number provided. Will attempt to call back.     RAJIV

## 2023-08-04 ENCOUNTER — TELEPHONE (OUTPATIENT)
Dept: OTHER | Age: 48
End: 2023-08-04

## 2023-08-04 NOTE — TELEPHONE ENCOUNTER
Kalina Andres called the MAT office regarding a referral received form the [phone] site (originally notified by Thais Deleon). Kalina Andres states that she was receiving Suboxone through [facility] but was having many issues with them and requests our assistance. She sates that she last used heroin 4-5 months ago, and also uses cocaine. Kalina Andres has no language barriers, nor is she hearing impaired. She states that she has psychiatric diagnoses of mood disorder, depression, anxiety, and PTSD and that she sees a psychiatrist at [facility]. She also states that she feels safe and has no current SI or HI. Patient scheduled. For your review.

## 2023-08-07 ENCOUNTER — DOCUMENTATION (OUTPATIENT)
Dept: PSYCHIATRY | Facility: CLINIC | Age: 48
End: 2023-08-07

## 2023-08-09 ENCOUNTER — OFFICE VISIT (OUTPATIENT)
Dept: OTHER | Age: 48
End: 2023-08-09

## 2023-08-09 VITALS
BODY MASS INDEX: 26.6 KG/M2 | DIASTOLIC BLOOD PRESSURE: 70 MMHG | HEIGHT: 69 IN | HEART RATE: 53 BPM | SYSTOLIC BLOOD PRESSURE: 108 MMHG | WEIGHT: 179.6 LBS

## 2023-08-09 DIAGNOSIS — F43.10 PTSD (POST-TRAUMATIC STRESS DISORDER): ICD-10-CM

## 2023-08-09 DIAGNOSIS — F11.90 OPIOID USE DISORDER: Primary | ICD-10-CM

## 2023-08-09 DIAGNOSIS — R46.89 RISK TAKING BEHAVIOR: ICD-10-CM

## 2023-08-09 PROBLEM — F19.90 POLYSUBSTANCE USE DISORDER: Status: ACTIVE | Noted: 2021-11-22

## 2023-08-09 RX ORDER — HYDROXYZINE PAMOATE 50 MG/1
50 CAPSULE ORAL 3 TIMES DAILY
Qty: 90 CAPSULE | Refills: 1 | Status: SHIPPED | OUTPATIENT
Start: 2023-08-09 | End: 2023-10-08

## 2023-08-09 RX ORDER — PRAZOSIN HYDROCHLORIDE 2 MG/1
2 CAPSULE ORAL
Qty: 30 CAPSULE | Refills: 1 | Status: SHIPPED | OUTPATIENT
Start: 2023-08-09 | End: 2023-09-08 | Stop reason: SDUPTHER

## 2023-08-09 RX ORDER — BUPRENORPHINE AND NALOXONE 8; 2 MG/1; MG/1
8 FILM, SOLUBLE BUCCAL; SUBLINGUAL 2 TIMES DAILY
Qty: 30 FILM | Refills: 0 | Status: SHIPPED | OUTPATIENT
Start: 2023-08-09 | End: 2023-08-23 | Stop reason: SDUPTHER

## 2023-08-09 RX ORDER — NALOXONE HYDROCHLORIDE 4 MG/.1ML
SPRAY NASAL
Qty: 1 EACH | Refills: 5 | Status: SHIPPED | OUTPATIENT
Start: 2023-08-09 | End: 2024-08-08

## 2023-08-09 NOTE — PROGRESS NOTES
[facility]     History and Physical  Arielle Cabrera 57 y.o. female MRN: [id number]   @ Encounter: [id number]       5. Opioid use disorder  Assessment & Plan:  · Hx of opioid use disorder with MAT maintenance   · Reports she has been taking 8mg BID and has been doing well. · Will continue current dose   · RTC 1 month   · PDMP as expected  · Encourage engagement with recovery resources. Orders:  -     naloxone (NARCAN) 4 mg/0.1 mL nasal spray; Administer 1 spray into a nostril. If no response after 2-3 minutes, give another dose in the other nostril using a new spray. 2. PTSD (post-traumatic stress disorder)  Assessment & Plan:  · Follows with psychiatry for management   · Euthymic today   · Encourage continued follow up with psych. Orders:  -     prazosin (MINIPRESS) 2 mg capsule; Take 1 capsule (2 mg total) by mouth daily at bedtime  -     hydrOXYzine pamoate (VISTARIL) 50 mg capsule; Take 1 capsule (50 mg total) by mouth 3 (three) times a day    3. Risk taking behavior  -     Comprehensive metabolic panel; Future  -     CBC and differential; Future  -     Hepatitis panel, acute; Future  -     HIV 1/2 AB/AG w Reflex [facility] for 2 yr old and above; Future      In addition to the above recommendations, the patient will also be referred to the [facility] Certified Addiction Counselors for additional evaluation and psychotherapy. We will also engage our Certified  for patient support. HPI: Arielle Cabrera is a 57y.o. year old female who presents today to establish care with [facility]. She has a pertinent medical hx of Graves Disease, PTSD, Depression, Anxiety, cardiomyopathy during pregnancy, IBS, GERD. She endorses a history of OUD starting approximately one year ago. Also uses cocaine and marijuana intermittently. Last use of cocaine several days ago. She has a history of 3 overdoses that subsequently with time have caused her issues with memory.  She has been on Suboxone for 4 months and denies any use of fentanyl since then. She has a PCP and psychiatrist that she follows for preventative care. Review of PDMP:     PDMP Review     None             Social History     Tobacco Use   Smoking Status Every Day   • Packs/day: 0.50   • Years: 30.00   • Total pack years: 15.00   • Types: Cigarettes   Smokeless Tobacco Never        Review of Systems   Constitutional: Negative for chills and fatigue. Eyes: Negative for visual disturbance. Respiratory: Negative for cough and shortness of breath. Gastrointestinal: Negative for nausea. Musculoskeletal: Negative for myalgias. Neurological: Negative for tremors and seizures. Psychiatric/Behavioral: The patient is not nervous/anxious. ROS     Historical Information      Past Medical History:   Diagnosis Date   • Cardiomyopathy ([address])    • Elevated transaminase measurement    • Irritable bowel syndrome (IBS)     with Constipation   • Nutritional deficiency    • Overactive thyroid gland    • Palpitations    • Sweating abnormality    • Vaginal infection    • Vitamin D insufficiency         Past Surgical History:   Procedure Laterality Date   • ANKLE SURGERY Left    • FINGER SURGERY     • TUBAL LIGATION     • UPPER GASTROINTESTINAL ENDOSCOPY          Family History   Problem Relation Age of Onset   • No Known Problems Mother    • Diabetes Father    • Cancer Father         Social History      Marital Status: Single    Patient Pre-hospital Living Situation:  Stable    Communicable diseases:     Tuberculosis (TB):   Have you traveled extensively (more than 4 weeks) outside the [place] in the last five years to high tuberculosis incidence areas ([place], On license of [facility], [facility], [place])?: No  Have you resided in any of these facilities in the past year: jails, prisons, shelters, nursing homes, and other long-term care facilities such as rehabilitation centers?  If residents of any of these facilities were tested within the past three (3) tripp: No  Have you had any close contact with someone diagnosed with tuberculosis?: No  Have you been homeless within the past year?: No  Have you ever injected drugs?: No  Do you or anyone in your household, currently have the following symptoms such as a sustained cough for two or more weeks, coughing up blood, fever/chills, loss of appetite, unexplained weight loss, fatigue, night sweats? : No  Do you currently have or anticipate having any condition that would decrease your immune system?: No    Viral Hepatitis:  Have you been tested for Hepatitis B or C?: Yes  Diagnosed with Hepatitis B or C?: No  If you have not been diagnosed with Hepatitis B or C, do you meet any of these criteria: received a clotting factor produced before 1987, received hemodialysis, been diagnosed with HIV, or received a blood transfusion or organ transplant?: No    HIV:  Have you been diagnosed with HIV?: No  Do you have high risk factors for HIV including injecting drugs, multiple sexual partners, engaging in unprotected sexual activity, infected or recently treated for viral hepatitis, or infected or recently treated for a sexually transmitted disease(STD)?: No    Allergies   Allergen Reactions   • Gabapentin Anaphylaxis   • Hydrocodone Hives   • Adhesive [Medical Tape] Rash        Prior to Admission medications    Medication Sig Start Date End Date Taking?  Authorizing Provider   ALPRAZolam Leopold Sails) 1 mg tablet Take 1 mg by mouth 4 (four) times a day as needed for anxiety    Historical Provider, MD   aluminum-magnesium hydroxide-simethicone (MAALOX MAX) 400-400-40 MG/5ML suspension Take 10 mL by mouth every 6 (six) hours as needed for indigestion or heartburn (nausea) 2/4/21   Oneyda Chao PA-C   famotidine (PEPCID) 40 MG tablet Take 1 tablet (40 mg total) by mouth daily at bedtime as needed for heartburn 5/29/20   Mylene Florian DO   guaiFENesin (MUCINEX) 600 mg 12 hr tablet Take 1 tablet (600 mg total) by mouth every 12 (twelve) hours 11/23/21   Ger Anderson MD   ibuprofen (MOTRIN) 600 mg tablet Take 1 tablet (600 mg total) by mouth every 6 (six) hours as needed for mild pain 2/18/20   Keren Wiley PA-C   magnesium oxide (MAG-OX) 400 mg Take 2 tablets (800 mg total) by mouth 2 (two) times a day for 7 days 6/18/21 6/25/21  Zoë Walker DO   methimazole (TAPAZOLE) 5 mg tablet Take 2.5 mg by mouth daily  Patient not taking: Reported on 8/18/2022 7/29/21   Historical Provider, MD   mirtazapine (REMERON SOL-TAB) 30 mg dispersible tablet Take 30 mg by mouth  Patient not taking: Reported on 8/18/2022 8/8/19   Historical Provider, MD   Multiple Vitamin (multivitamin) tablet Take 1 tablet by mouth daily    Historical Provider, MD   omeprazole (PriLOSEC) 40 MG capsule  9/2/21   Historical Provider, MD   ziprasidone (GEODON) 80 mg capsule Take 80 mg by mouth daily at bedtime 8/8/19   Historical Provider, MD   ziprasidone (GEODON) 80 mg capsule Take 40 mg by mouth every morning 8/12/21   Historical Provider, MD       buprenorphine-naloxone     Objective      Vitals    Vitals:    08/09/23 0858   BP: 108/70   Pulse: (!) 53       Physical Exam  Vitals reviewed. Constitutional:       General: She is not in acute distress. HENT:      Head: Normocephalic and atraumatic. Eyes:      Conjunctiva/sclera: Conjunctivae normal.   Cardiovascular:      Rate and Rhythm: Normal rate and regular rhythm. Pulses: Normal pulses. Heart sounds: Normal heart sounds. Pulmonary:      Effort: Pulmonary effort is normal.      Breath sounds: Normal breath sounds. Abdominal:      Palpations: Abdomen is soft. Tenderness: There is no abdominal tenderness. Musculoskeletal:      Cervical back: Neck supple. Skin:     General: Skin is warm and dry. Neurological:      General: No focal deficit present. Mental Status: She is alert and oriented to person, place, and time.    Psychiatric:         Attention and Perception: Attention normal.         Mood and Affect: Mood is not anxious. Speech: Speech normal.         Behavior: Behavior is cooperative. COWS Score:          Data:     Lab Results:  Results from last 6 Months   Lab Units 08/15/23  1417   WBC Thousand/uL 6.38   HEMOGLOBIN g/dL 13.4   HEMATOCRIT % 41.4   PLATELETS Thousands/uL 293        Results from last 6 Months   Lab Units 08/15/23  1417   POTASSIUM mmol/L 4.6   CHLORIDE mmol/L 103   CO2 mmol/L 29   BUN mg/dL 15   CREATININE mg/dL 0.68   CALCIUM mg/dL 9.1   ALBUMIN g/dL 4.4   ALK PHOS U/L 127*   ALT U/L 16   AST U/L 21        Hepatitis panel:  Results from last 6 Months   Lab Units 08/15/23  1417   HEP B S AG  Non-reactive   HEP A IGM  Non-reactive   HEP C AB  Non-reactive   HEP B C IGM  Equivocal*       HIV results:   Results from last 6 Months   Lab Units 08/15/23  1417   HIV-1 P24 ANTIGEN-BIOPLEX  Non-Reactive       TB:        Imaging Studies: I have personally reviewed pertinent reports. EKG, Pathology, and Other Studies: I have personally reviewed pertinent reports. Counseling / Coordination of Care     Total time spent today 36 minutes. Greater than 50% of total time was spent with the patient and / or family counseling and / or coordination of care. ** Please Note: This note may have been constructed using a voice recognition system.  **     MADDISON Morris

## 2023-08-15 ENCOUNTER — APPOINTMENT (OUTPATIENT)
Dept: LAB | Facility: HOSPITAL | Age: 48
End: 2023-08-15
Payer: MEDICARE

## 2023-08-15 DIAGNOSIS — R46.89 RISK TAKING BEHAVIOR: ICD-10-CM

## 2023-08-15 LAB
ALBUMIN SERPL BCP-MCNC: 4.4 G/DL (ref 3.5–5)
ALP SERPL-CCNC: 127 U/L (ref 34–104)
ALT SERPL W P-5'-P-CCNC: 16 U/L (ref 7–52)
ANION GAP SERPL CALCULATED.3IONS-SCNC: 6 MMOL/L
AST SERPL W P-5'-P-CCNC: 21 U/L (ref 13–39)
BASOPHILS # BLD AUTO: 0.06 THOUSANDS/ÂΜL (ref 0–0.1)
BASOPHILS NFR BLD AUTO: 1 % (ref 0–1)
BILIRUB SERPL-MCNC: 0.36 MG/DL (ref 0.2–1)
BUN SERPL-MCNC: 15 MG/DL (ref 5–25)
CALCIUM SERPL-MCNC: 9.1 MG/DL (ref 8.4–10.2)
CHLORIDE SERPL-SCNC: 103 MMOL/L (ref 96–108)
CO2 SERPL-SCNC: 29 MMOL/L (ref 21–32)
CREAT SERPL-MCNC: 0.68 MG/DL (ref 0.6–1.3)
EOSINOPHIL # BLD AUTO: 0.38 THOUSAND/ÂΜL (ref 0–0.61)
EOSINOPHIL NFR BLD AUTO: 6 % (ref 0–6)
ERYTHROCYTE [DISTWIDTH] IN BLOOD BY AUTOMATED COUNT: 13.3 % (ref 11.6–15.1)
GFR SERPL CREATININE-BSD FRML MDRD: 105 ML/MIN/1.73SQ M
GLUCOSE P FAST SERPL-MCNC: 99 MG/DL (ref 65–99)
HCT VFR BLD AUTO: 41.4 % (ref 34.8–46.1)
HGB BLD-MCNC: 13.4 G/DL (ref 11.5–15.4)
HIV 1+2 AB+HIV1 P24 AG SERPL QL IA: NORMAL
HIV 2 AB SERPL QL IA: NORMAL
HIV1 AB SERPL QL IA: NORMAL
HIV1 P24 AG SERPL QL IA: NORMAL
IMM GRANULOCYTES # BLD AUTO: 0.01 THOUSAND/UL (ref 0–0.2)
IMM GRANULOCYTES NFR BLD AUTO: 0 % (ref 0–2)
LYMPHOCYTES # BLD AUTO: 2.06 THOUSANDS/ÂΜL (ref 0.6–4.47)
LYMPHOCYTES NFR BLD AUTO: 32 % (ref 14–44)
MCH RBC QN AUTO: 29.7 PG (ref 26.8–34.3)
MCHC RBC AUTO-ENTMCNC: 32.4 G/DL (ref 31.4–37.4)
MCV RBC AUTO: 92 FL (ref 82–98)
MONOCYTES # BLD AUTO: 0.37 THOUSAND/ÂΜL (ref 0.17–1.22)
MONOCYTES NFR BLD AUTO: 6 % (ref 4–12)
NEUTROPHILS # BLD AUTO: 3.5 THOUSANDS/ÂΜL (ref 1.85–7.62)
NEUTS SEG NFR BLD AUTO: 55 % (ref 43–75)
NRBC BLD AUTO-RTO: 0 /100 WBCS
PLATELET # BLD AUTO: 293 THOUSANDS/UL (ref 149–390)
PMV BLD AUTO: 9.5 FL (ref 8.9–12.7)
POTASSIUM SERPL-SCNC: 4.6 MMOL/L (ref 3.5–5.3)
PROT SERPL-MCNC: 7.2 G/DL (ref 6.4–8.4)
RBC # BLD AUTO: 4.51 MILLION/UL (ref 3.81–5.12)
SODIUM SERPL-SCNC: 138 MMOL/L (ref 135–147)
WBC # BLD AUTO: 6.38 THOUSAND/UL (ref 4.31–10.16)

## 2023-08-15 PROCEDURE — 80074 ACUTE HEPATITIS PANEL: CPT

## 2023-08-15 PROCEDURE — 85025 COMPLETE CBC W/AUTO DIFF WBC: CPT

## 2023-08-15 PROCEDURE — 36415 COLL VENOUS BLD VENIPUNCTURE: CPT

## 2023-08-15 PROCEDURE — 87389 HIV-1 AG W/HIV-1&-2 AB AG IA: CPT

## 2023-08-15 PROCEDURE — 80053 COMPREHEN METABOLIC PANEL: CPT

## 2023-08-16 LAB
HAV IGM SER QL: ABNORMAL
HBV CORE IGM SER QL: ABNORMAL
HBV SURFACE AG SER QL: ABNORMAL
HCV AB SER QL: ABNORMAL

## 2023-08-16 NOTE — PROGRESS NOTES
Note Type: Case Management Note                  Date of Service: [unfilled]  Service:  [facility]    Note: Case Management Note  Steph Olvera 57 y.o. female [id number]  Attending  Substance Use History     Social History     Substance and Sexual Activity   Alcohol Use Yes    Comment: occasionally        Social History     Substance and Sexual Activity   Drug Use Yes   • Types: Cocaine, Heroin    Comment: Last use today       Encounter Type:   Patient Face-to-Face    Start Time 0815  End Time 0845    Recovery needs addressed at this meeting:  Basic  Needs, Physical Health, Life Skills and Recovery Resources    Note  D: Patient presented for in-person, scheduled visit with this CM. This is to be patient's initial visit with this CM, as well as the [place] office. Please utilize this documentation as formal intake. Patient and this CM successfully completed BioPsychoSocial Assessment during this initial assessment (BPS). Patient request to be seen in this office for continued Suboxone maintenance as patient began with program at [facility] for approximately 2 mos and did not feel to find success within the program.     Patient currently lives with boyfriend, receives disability. Patient discussed hx of substance use - Alcohol (no current), cocaine (current use every few weeks, approx 1 gram), heroin/fentanyl (last use 2 mos ago, "A LOT"). Patient did state to attend rehab in early 25s regarding alcohol use, but does not find any barriers to alcohol use currently. A: Patient and this CM did complete symptom checklist in which patient mentioned previous diagnosis of PTSD, MDD, episodic mood disorder, and anxiety. Patient denied any current symptoms of depression, brittney. Patient did not state to be a danger to self or others.  No symptoms of SI/HI    P: Upon completion of BPS, this CM did recommend patient to establish care with Medical Toxicology     This CM did utilize [facility] in order for patient to obtain suboxone script until established care as patient stated previous clinic has stopped all prescriptions.      Referrals made  [facility]    Next appointment date and time  No future appts necessary with this CM

## 2023-08-22 ENCOUNTER — TELEPHONE (OUTPATIENT)
Dept: OTHER | Age: 48
End: 2023-08-22

## 2023-08-22 NOTE — TELEPHONE ENCOUNTER
Called Blake Mancilla to reschedule her 9/11 appt with Ayleen Lozano, provider, as Ayleen Lozano is not available that day, but had to leave a . [facility] office number provided. Will try to call her back at a later date.

## 2023-08-23 DIAGNOSIS — F11.90 OPIOID USE DISORDER: ICD-10-CM

## 2023-08-23 NOTE — TELEPHONE ENCOUNTER
Milford Nyhan called the MAT office requesting a refill of her Suboxone medication. Last refill was by [facility], provider, for a 15-day supply on 8/9. Last OV was 8/9  Next OV is 8/28    April Dove, please advise.

## 2023-08-24 RX ORDER — BUPRENORPHINE AND NALOXONE 8; 2 MG/1; MG/1
8 FILM, SOLUBLE BUCCAL; SUBLINGUAL 2 TIMES DAILY
Qty: 8 FILM | Refills: 0 | Status: SHIPPED | OUTPATIENT
Start: 2023-08-24 | End: 2023-08-28 | Stop reason: DRUGHIGH

## 2023-08-24 NOTE — TELEPHONE ENCOUNTER
Sushil Mendoza called back to the MAT office inquiring about the Suboxone refill and notified her that Dr. Nelida Chapman sent a 4 day supply to the pharmacy. Suggested to Sushil Mendoza that she call the pharmacy to check the availability of this prescription and call us back if she has any issues. She was very appreciative.

## 2023-08-28 ENCOUNTER — OFFICE VISIT (OUTPATIENT)
Dept: OTHER | Age: 48
End: 2023-08-28

## 2023-08-28 VITALS
SYSTOLIC BLOOD PRESSURE: 111 MMHG | WEIGHT: 179 LBS | BODY MASS INDEX: 26.51 KG/M2 | DIASTOLIC BLOOD PRESSURE: 75 MMHG | HEIGHT: 69 IN

## 2023-08-28 DIAGNOSIS — F19.90 POLYSUBSTANCE USE DISORDER: ICD-10-CM

## 2023-08-28 DIAGNOSIS — F11.90 OPIOID USE DISORDER: Primary | ICD-10-CM

## 2023-08-28 RX ORDER — BUPRENORPHINE AND NALOXONE 12; 3 MG/1; MG/1
12 FILM, SOLUBLE BUCCAL; SUBLINGUAL 2 TIMES DAILY
Qty: 60 FILM | Refills: 0 | Status: SHIPPED | OUTPATIENT
Start: 2023-08-28 | End: 2023-09-27

## 2023-08-28 NOTE — ASSESSMENT & PLAN NOTE
· Patient reports that she started experiencing withdrawal symptoms on her current dose of Suboxone 8 mg BID. She then started to increase her dose to 8 mg TID and denied having any withdrawal symptoms or cravings on that dosage.    · Will increase dosage to 12 mg BID   · Obtain UDS for routine monitoring at next visit  · RTC in 1 month

## 2023-08-28 NOTE — ASSESSMENT & PLAN NOTE
· Endorses cocaine use , last use two days ago  · Encouraged cessation   · UDS for routine monitoring recommended at next visit

## 2023-08-28 NOTE — PROGRESS NOTES
[facility]    Progress Note  Justa Glass 57 y.o. female MRN: [id number]  @ Encounter: [id number]      3. Opioid use disorder  Assessment & Plan:  · Patient reports that she started experiencing withdrawal symptoms on her current dose of Suboxone 8 mg BID. She then started to increase her dose to 8 mg TID and denied having any withdrawal symptoms or cravings on that dosage. · Will increase dosage to 12 mg BID   · Obtain UDS for routine monitoring at next visit  · RTC in 1 month       Orders:  -     buprenorphine-naloxone (Suboxone) 12-3 MG; Place 1 Film (12 mg total) under the tongue 2 (two) times a day    2. Polysubstance use disorder  Assessment & Plan:  · Endorses cocaine use , last use two days ago  · Encouraged cessation   · UDS for routine monitoring recommended at next visit           Support Services  Case Management and Certified  are following. Patient was referred to the [facility] Certified Addiction Counselors: Yes  The patient is actively engaged with the [facility] Certified Addiction Counselor’s psychotherapy plan: Yes    buprenorphine-naloxone      PDMP reviewed:     PDMP Review     None          SUBJECTIVE  Patient reports that she was experiencing withdrawal symptoms on Suboxone 8mg BID and cravings. She started to increase her dose at home on her own to 8mg TID. She reports that she no longer was experiencing withdrawal symptoms or the cravings to use. Patient also continues to endorse cocaine use. Strongly encouraged CRS support and cessation of polysubstances.      Drug cravings: yes with 8mg BID dosing   Motivation to continue treatment: yes       Current Outpatient Medications:   •  buprenorphine-naloxone (Suboxone) 12-3 MG, Place 1 Film (12 mg total) under the tongue 2 (two) times a day, Disp: 60 Film, Rfl: 0  •  hydrOXYzine pamoate (VISTARIL) 50 mg capsule, Take 1 capsule (50 mg total) by mouth 3 (three) times a day, Disp: 90 capsule, Rfl: 1  • methimazole (TAPAZOLE) 5 mg tablet, Take 2.5 mg by mouth daily, Disp: , Rfl:   •  Multiple Vitamin (multivitamin) tablet, Take 1 tablet by mouth daily, Disp: , Rfl:   •  naloxone (NARCAN) 4 mg/0.1 mL nasal spray, Administer 1 spray into a nostril. If no response after 2-3 minutes, give another dose in the other nostril using a new spray., Disp: 1 each, Rfl: 5  •  prazosin (MINIPRESS) 2 mg capsule, Take 1 capsule (2 mg total) by mouth daily at bedtime, Disp: 30 capsule, Rfl: 1  •  ziprasidone (GEODON) 80 mg capsule, Take 80 mg by mouth daily at bedtime, Disp: , Rfl:   •  ALPRAZolam (XANAX) 1 mg tablet, Take 1 mg by mouth 4 (four) times a day as needed for anxiety (Patient not taking: Reported on 8/28/2023), Disp: , Rfl:   •  aluminum-magnesium hydroxide-simethicone (MAALOX MAX) 400-400-40 MG/5ML suspension, Take 10 mL by mouth every 6 (six) hours as needed for indigestion or heartburn (nausea), Disp: 355 mL, Rfl: 0  •  famotidine (PEPCID) 40 MG tablet, Take 1 tablet (40 mg total) by mouth daily at bedtime as needed for heartburn, Disp: 20 tablet, Rfl: 0  •  guaiFENesin (MUCINEX) 600 mg 12 hr tablet, Take 1 tablet (600 mg total) by mouth every 12 (twelve) hours, Disp: 14 tablet, Rfl: 0  •  ibuprofen (MOTRIN) 600 mg tablet, Take 1 tablet (600 mg total) by mouth every 6 (six) hours as needed for mild pain, Disp: 40 tablet, Rfl: 0  •  magnesium oxide (MAG-OX) 400 mg, Take 2 tablets (800 mg total) by mouth 2 (two) times a day for 7 days, Disp: 28 tablet, Rfl: 0  •  mirtazapine (REMERON SOL-TAB) 30 mg dispersible tablet, Take 30 mg by mouth (Patient not taking: Reported on 8/18/2022), Disp: , Rfl:   •  omeprazole (PriLOSEC) 40 MG capsule, , Disp: , Rfl:   •  ziprasidone (GEODON) 80 mg capsule, Take 40 mg by mouth every morning (Patient not taking: Reported on 8/28/2023), Disp: , Rfl:     Objective     Vitals:    08/28/23 1356   BP: 111/75       Physical Exam  Constitutional:       General: She is not in acute distress. Appearance: She is not diaphoretic. Psychiatric:         Mood and Affect: Mood is not anxious or depressed. Lab Results:   Results from last 6 Months   Lab Units 08/15/23  1417   WBC Thousand/uL 6.38   HEMOGLOBIN g/dL 13.4   HEMATOCRIT % 41.4   PLATELETS Thousands/uL 293      Results from last 6 Months   Lab Units 08/15/23  1417   POTASSIUM mmol/L 4.6   CHLORIDE mmol/L 103   CO2 mmol/L 29   BUN mg/dL 15   CREATININE mg/dL 0.68   CALCIUM mg/dL 9.1   ALBUMIN g/dL 4.4   ALK PHOS U/L 127*   ALT U/L 16   AST U/L 21     Results from last 6 Months   Lab Units 08/15/23  1417   HEP B S AG  Non-reactive   HEP A IGM  Non-reactive   HEP C AB  Non-reactive   HEP B C IGM  Equivocal*     Results from last 6 Months   Lab Units 08/15/23  1417   HIV-1 P24 ANTIGEN-ApieronLEX  Non-Reactive                 Counseling / Coordination of Care  Total time spent today 20 minutes. Greater than 50% of total time was spent with the patient and / or family counseling and / or coordination of care.      Tee Blackman PA-C

## 2023-08-29 ENCOUNTER — TELEPHONE (OUTPATIENT)
Dept: BEHAVIORAL/MENTAL HEALTH CLINIC | Facility: CLINIC | Age: 48
End: 2023-08-29

## 2023-08-29 NOTE — TELEPHONE ENCOUNTER
Patient called [facility] office stating that she forgot to speak to provider to see if she can get a prescription for antianxiety. Last appt was with Sandra Mooney 8/28    Call back number [phone]    For your review. Thank you.

## 2023-08-31 NOTE — TELEPHONE ENCOUNTER
Per Prabhakar Shi, provider, she cannot prescribe any anti-anxiety medications as this was not discussed with the patient. Called and spoke to Omega Werner reviewing above. She was very understanding and appreciative and will discuss with provider at next appt.

## 2023-09-06 NOTE — ASSESSMENT & PLAN NOTE
· Hx of opioid use disorder with MAT maintenance   · Reports she has been taking 8mg BID and has been doing well. · Will continue current dose   · RTC 1 month   · PDMP as expected  · Encourage engagement with recovery resources.

## 2023-09-06 NOTE — ASSESSMENT & PLAN NOTE
· Follows with psychiatry for management   · Euthymic today   · Encourage continued follow up with psych.

## 2023-09-08 DIAGNOSIS — F43.10 PTSD (POST-TRAUMATIC STRESS DISORDER): ICD-10-CM

## 2023-09-08 RX ORDER — PRAZOSIN HYDROCHLORIDE 2 MG/1
2 CAPSULE ORAL
Qty: 30 CAPSULE | Refills: 1 | Status: SHIPPED | OUTPATIENT
Start: 2023-09-08 | End: 2023-10-08

## 2023-09-08 NOTE — TELEPHONE ENCOUNTER
Notified Veryl Anurag that the requested prescription was sent to the pharmacy. She was very appreciative.

## 2023-09-08 NOTE — TELEPHONE ENCOUNTER
Frankey Agent called the MAT office requesting a refill for her prazosin 2 mg caps.     Last fill 8/9 by Andrey Gaspar  Last OV 8/28 with Carrie Queen  Next OV 9/25    Carrie Queen or Dr. Teresa Richards, please advise

## 2023-09-25 ENCOUNTER — OFFICE VISIT (OUTPATIENT)
Dept: OTHER | Age: 48
End: 2023-09-25

## 2023-09-25 VITALS
BODY MASS INDEX: 28.29 KG/M2 | DIASTOLIC BLOOD PRESSURE: 79 MMHG | HEIGHT: 69 IN | SYSTOLIC BLOOD PRESSURE: 127 MMHG | WEIGHT: 191 LBS | HEART RATE: 59 BPM

## 2023-09-25 DIAGNOSIS — F43.10 PTSD (POST-TRAUMATIC STRESS DISORDER): ICD-10-CM

## 2023-09-25 DIAGNOSIS — F11.90 OPIOID USE DISORDER: Primary | ICD-10-CM

## 2023-09-25 RX ORDER — HYDROXYZINE HYDROCHLORIDE 25 MG/1
25 TABLET, FILM COATED ORAL EVERY 6 HOURS PRN
Qty: 120 TABLET | Refills: 0 | Status: SHIPPED | OUTPATIENT
Start: 2023-09-25 | End: 2023-10-25

## 2023-09-25 RX ORDER — BUPRENORPHINE AND NALOXONE 12; 3 MG/1; MG/1
12 FILM, SOLUBLE BUCCAL; SUBLINGUAL 2 TIMES DAILY
Qty: 60 FILM | Refills: 0 | Status: SHIPPED | OUTPATIENT
Start: 2023-09-25 | End: 2023-10-25

## 2023-09-25 RX ORDER — PRAZOSIN HYDROCHLORIDE 2 MG/1
2 CAPSULE ORAL
Qty: 30 CAPSULE | Refills: 0 | Status: SHIPPED | OUTPATIENT
Start: 2023-09-25 | End: 2023-10-25

## 2023-09-25 NOTE — PROGRESS NOTES
[facility]    Progress Note  Kathy Benoit 57 y.o. female MRN: [id number]  @ Encounter: [id number]      4. Opioid use disorder  Assessment & Plan:  • Patient reports to doing well with current dosage of 12 mg BID. Not experiencing any cravings. Denies any re-ocurrence of use. • PDMP reviewed  • Will continue current dosage   • RTC in 1 month     Orders:  -     buprenorphine-naloxone (Suboxone) 12-3 MG; Place 1 Film (12 mg total) under the tongue 2 (two) times a day    2. PTSD (post-traumatic stress disorder)  Assessment & Plan:  · Patient is reporting increasing anxiety and worsening nightmares. She is currently on a waitlist to see outpatient psychiatry. · Will refill Atarax and Prazosin at this visit  · Patient states she is interested in outpatient therapy, will place referral.     Orders:  -     prazosin (MINIPRESS) 2 mg capsule; Take 1 capsule (2 mg total) by mouth daily at bedtime  -     hydrOXYzine HCL (ATARAX) 25 mg tablet; Take 1 tablet (25 mg total) by mouth every 6 (six) hours as needed for anxiety        Support Services  Case Management and Certified  are following. Patient was referred to the [facility] Certified Addiction Counselors: Yes  The patient is actively engaged with the [facility] Certified Addiction Counselor’s psychotherapy plan: Yes    buprenorphine-naloxone      PDMP reviewed:     PDMP Review       Value Time User    PDMP Reviewed  Yes 9/25/2023  3:23 PM Bessy Lindsey PA-C          SUBJECTIVE  Patient seen in office. She reports that she is continuing to use Crack cocaine every two days. Is interested in going to an IOP program to help with stopping that addiction. She reports that she is not having any opioid cravings after increasing her dose last month. Patient states she is still experiencing anxiety and nightmares. She is on a waiting list for psychiatry and has run out of her psychiatric medication.  She is also interested in engaging in an outpatient therapist.     Drug cravings: none   Motivation to continue treatment: yes       Current Outpatient Medications:   •  buprenorphine-naloxone (Suboxone) 12-3 MG, Place 1 Film (12 mg total) under the tongue 2 (two) times a day, Disp: 60 Film, Rfl: 0  •  hydrOXYzine HCL (ATARAX) 25 mg tablet, Take 1 tablet (25 mg total) by mouth every 6 (six) hours as needed for anxiety, Disp: 120 tablet, Rfl: 0  •  prazosin (MINIPRESS) 2 mg capsule, Take 1 capsule (2 mg total) by mouth daily at bedtime, Disp: 30 capsule, Rfl: 0  •  ALPRAZolam (XANAX) 1 mg tablet, Take 1 mg by mouth 4 (four) times a day as needed for anxiety (Patient not taking: Reported on 8/28/2023), Disp: , Rfl:   •  aluminum-magnesium hydroxide-simethicone (MAALOX MAX) 400-400-40 MG/5ML suspension, Take 10 mL by mouth every 6 (six) hours as needed for indigestion or heartburn (nausea), Disp: 355 mL, Rfl: 0  •  famotidine (PEPCID) 40 MG tablet, Take 1 tablet (40 mg total) by mouth daily at bedtime as needed for heartburn, Disp: 20 tablet, Rfl: 0  •  guaiFENesin (MUCINEX) 600 mg 12 hr tablet, Take 1 tablet (600 mg total) by mouth every 12 (twelve) hours, Disp: 14 tablet, Rfl: 0  •  hydrOXYzine pamoate (VISTARIL) 50 mg capsule, Take 1 capsule (50 mg total) by mouth 3 (three) times a day, Disp: 90 capsule, Rfl: 1  •  ibuprofen (MOTRIN) 600 mg tablet, Take 1 tablet (600 mg total) by mouth every 6 (six) hours as needed for mild pain, Disp: 40 tablet, Rfl: 0  •  magnesium oxide (MAG-OX) 400 mg, Take 2 tablets (800 mg total) by mouth 2 (two) times a day for 7 days, Disp: 28 tablet, Rfl: 0  •  methimazole (TAPAZOLE) 5 mg tablet, Take 2.5 mg by mouth daily, Disp: , Rfl:   •  mirtazapine (REMERON SOL-TAB) 30 mg dispersible tablet, Take 30 mg by mouth (Patient not taking: Reported on 8/18/2022), Disp: , Rfl:   •  Multiple Vitamin (multivitamin) tablet, Take 1 tablet by mouth daily, Disp: , Rfl:   •  naloxone (NARCAN) 4 mg/0.1 mL nasal spray, Administer 1 spray into a nostril. If no response after 2-3 minutes, give another dose in the other nostril using a new spray., Disp: 1 each, Rfl: 5  •  omeprazole (PriLOSEC) 40 MG capsule, , Disp: , Rfl:   •  ziprasidone (GEODON) 80 mg capsule, Take 80 mg by mouth daily at bedtime, Disp: , Rfl:   •  ziprasidone (GEODON) 80 mg capsule, Take 40 mg by mouth every morning (Patient not taking: Reported on 8/28/2023), Disp: , Rfl:     Objective     There were no vitals filed for this visit. Physical Exam  Constitutional:       General: She is not in acute distress. Neurological:      Mental Status: She is alert. Psychiatric:         Mood and Affect: Mood is anxious. Lab Results:   Results from last 6 Months   Lab Units 08/15/23  1417   WBC Thousand/uL 6.38   HEMOGLOBIN g/dL 13.4   HEMATOCRIT % 41.4   PLATELETS Thousands/uL 293      Results from last 6 Months   Lab Units 08/15/23  1417   POTASSIUM mmol/L 4.6   CHLORIDE mmol/L 103   CO2 mmol/L 29   BUN mg/dL 15   CREATININE mg/dL 0.68   CALCIUM mg/dL 9.1   ALBUMIN g/dL 4.4   ALK PHOS U/L 127*   ALT U/L 16   AST U/L 21     Results from last 6 Months   Lab Units 08/15/23  1417   HEP B S AG  Non-reactive   HEP A IGM  Non-reactive   HEP C AB  Non-reactive   HEP B C IGM  Equivocal*     Results from last 6 Months   Lab Units 08/15/23  1417   HIV-1 P24 ANTIGEN-BIOPLEX  Non-Reactive                 Counseling / Coordination of Care  Total time spent today 15 minutes. Greater than 50% of total time was spent with the patient and / or family counseling and / or coordination of care.      Sania Gillespie PA-C

## 2023-09-25 NOTE — ASSESSMENT & PLAN NOTE
· Patient is reporting increasing anxiety and worsening nightmares. She is currently on a waitlist to see outpatient psychiatry.   · Will refill Atarax and Prazosin at this visit  · Patient states she is interested in outpatient therapy, will place referral.

## 2023-09-25 NOTE — ASSESSMENT & PLAN NOTE
• Patient reports to doing well with current dosage of 12 mg BID. Not experiencing any cravings. Denies any re-ocurrence of use.     • PDMP reviewed  • Will continue current dosage   • RTC in 1 month

## 2023-10-03 DIAGNOSIS — F43.10 PTSD (POST-TRAUMATIC STRESS DISORDER): ICD-10-CM

## 2023-10-23 ENCOUNTER — OFFICE VISIT (OUTPATIENT)
Dept: OTHER | Age: 48
End: 2023-10-23

## 2023-10-23 VITALS
HEART RATE: 76 BPM | BODY MASS INDEX: 27.58 KG/M2 | HEIGHT: 69 IN | WEIGHT: 186.2 LBS | DIASTOLIC BLOOD PRESSURE: 74 MMHG | SYSTOLIC BLOOD PRESSURE: 150 MMHG

## 2023-10-23 DIAGNOSIS — F11.90 OPIOID USE DISORDER: Primary | ICD-10-CM

## 2023-10-23 DIAGNOSIS — F43.10 PTSD (POST-TRAUMATIC STRESS DISORDER): ICD-10-CM

## 2023-10-23 RX ORDER — BUPRENORPHINE AND NALOXONE 12; 3 MG/1; MG/1
12 FILM, SOLUBLE BUCCAL; SUBLINGUAL 2 TIMES DAILY
Qty: 60 FILM | Refills: 0 | Status: SHIPPED | OUTPATIENT
Start: 2023-10-23 | End: 2023-11-22

## 2023-10-23 RX ORDER — HYDROXYZINE HYDROCHLORIDE 25 MG/1
50 TABLET, FILM COATED ORAL EVERY 6 HOURS PRN
Qty: 120 TABLET | Refills: 0 | Status: SHIPPED | OUTPATIENT
Start: 2023-10-23 | End: 2023-11-22

## 2023-10-23 RX ORDER — PRAZOSIN HYDROCHLORIDE 2 MG/1
2 CAPSULE ORAL
Qty: 30 CAPSULE | Refills: 0 | Status: SHIPPED | OUTPATIENT
Start: 2023-10-23 | End: 2023-11-22

## 2023-10-23 NOTE — ASSESSMENT & PLAN NOTE
Patient is reporting increasing anxiety and worsening nightmares.  She is currently on a waitlist to see outpatient psychiatry.  -Will refill Atarax and Prazosin at this visit  -Patient states she is interested in outpatient therapy, referral placed

## 2023-10-23 NOTE — ASSESSMENT & PLAN NOTE
Patient continues to do well on current dosage   Denies any cravings or re-occurrence of use. Patient requesting therapy within the [facility] appointment as she struggles at time with sobriety.    PDMP reviewed  Will continue current dosage   RTC in 1 month

## 2023-10-23 NOTE — PROGRESS NOTES
[facility]    Progress Note  Dian Cruz 57 y.o. female MRN: [id number]  @ Encounter: [id number]      9. Opioid use disorder  Assessment & Plan:  Patient continues to do well on current dosage   Denies any cravings or re-occurrence of use. Patient requesting therapy within the [address] appointment as she struggles at time with sobriety. PDMP reviewed  Will continue current dosage   RTC in 1 month     Orders:  -     buprenorphine-naloxone (Suboxone) 12-3 MG; Place 1 Film (12 mg total) under the tongue 2 (two) times a day    2. PTSD (post-traumatic stress disorder)  Assessment & Plan:  Patient is reporting increasing anxiety and worsening nightmares. She is currently on a waitlist to see outpatient psychiatry.  -Will refill Atarax and Prazosin at this visit  -Patient states she is interested in outpatient therapy, referral placed     Orders:  -     prazosin (MINIPRESS) 2 mg capsule; Take 1 capsule (2 mg total) by mouth daily at bedtime  -     hydrOXYzine HCL (ATARAX) 25 mg tablet; Take 2 tablets (50 mg total) by mouth every 6 (six) hours as needed for anxiety          Support Services  Case Management and Certified  are following. Patient was referred to the [facility] Certified Addiction Counselors: Yes  The patient is actively engaged with the [facility] Certified Addiction Counselor’s psychotherapy plan: Yes    buprenorphine-naloxone      PDMP reviewed:     PDMP Review         Value Time User    PDMP Reviewed  Yes 10/23/2023  3:39 PM Antoinette Reinoso PA-C            SUBJECTIVE  Patient is reporting increasing anxiety. She states she is interested in outpatient therapy. Currently doing well on current suboxone dosing.      Drug cravings: no  Motivation to continue treatment: high      Current Outpatient Medications:     buprenorphine-naloxone (Suboxone) 12-3 MG, Place 1 Film (12 mg total) under the tongue 2 (two) times a day, Disp: 60 Film, Rfl: 0    hydrOXYzine HCL (ATARAX) 25 mg tablet, Take 2 tablets (50 mg total) by mouth every 6 (six) hours as needed for anxiety, Disp: 120 tablet, Rfl: 0    prazosin (MINIPRESS) 2 mg capsule, Take 1 capsule (2 mg total) by mouth daily at bedtime, Disp: 30 capsule, Rfl: 0    ALPRAZolam (XANAX) 1 mg tablet, Take 1 mg by mouth 4 (four) times a day as needed for anxiety (Patient not taking: Reported on 8/28/2023), Disp: , Rfl:     aluminum-magnesium hydroxide-simethicone (MAALOX MAX) 400-400-40 MG/5ML suspension, Take 10 mL by mouth every 6 (six) hours as needed for indigestion or heartburn (nausea), Disp: 355 mL, Rfl: 0    famotidine (PEPCID) 40 MG tablet, Take 1 tablet (40 mg total) by mouth daily at bedtime as needed for heartburn, Disp: 20 tablet, Rfl: 0    guaiFENesin (MUCINEX) 600 mg 12 hr tablet, Take 1 tablet (600 mg total) by mouth every 12 (twelve) hours, Disp: 14 tablet, Rfl: 0    hydrOXYzine pamoate (VISTARIL) 50 mg capsule, Take 1 capsule (50 mg total) by mouth 3 (three) times a day, Disp: 90 capsule, Rfl: 1    ibuprofen (MOTRIN) 600 mg tablet, Take 1 tablet (600 mg total) by mouth every 6 (six) hours as needed for mild pain, Disp: 40 tablet, Rfl: 0    magnesium oxide (MAG-OX) 400 mg, Take 2 tablets (800 mg total) by mouth 2 (two) times a day for 7 days, Disp: 28 tablet, Rfl: 0    methimazole (TAPAZOLE) 5 mg tablet, Take 2.5 mg by mouth daily, Disp: , Rfl:     mirtazapine (REMERON SOL-TAB) 30 mg dispersible tablet, Take 30 mg by mouth (Patient not taking: Reported on 8/18/2022), Disp: , Rfl:     Multiple Vitamin (multivitamin) tablet, Take 1 tablet by mouth daily, Disp: , Rfl:     naloxone (NARCAN) 4 mg/0.1 mL nasal spray, Administer 1 spray into a nostril.  If no response after 2-3 minutes, give another dose in the other nostril using a new spray., Disp: 1 each, Rfl: 5    omeprazole (PriLOSEC) 40 MG capsule, , Disp: , Rfl:     ziprasidone (GEODON) 80 mg capsule, Take 80 mg by mouth daily at bedtime, Disp: , Rfl:     ziprasidone (GEODON) 80 mg capsule, Take 40 mg by mouth every morning, Disp: , Rfl:     Objective     Vitals:    10/23/23 1542   BP: 150/74   Pulse: 76       Physical Exam  Vitals and nursing note reviewed. Constitutional:       General: She is not in acute distress. Appearance: She is not diaphoretic. Neurological:      Mental Status: She is alert and oriented to person, place, and time. Motor: No tremor. Coordination: Coordination is intact. Gait: Gait is intact. Psychiatric:         Mood and Affect: Mood is anxious. Mood is not depressed. Lab Results:   Results from last 6 Months   Lab Units 08/15/23  1417   WBC Thousand/uL 6.38   HEMOGLOBIN g/dL 13.4   HEMATOCRIT % 41.4   PLATELETS Thousands/uL 293      Results from last 6 Months   Lab Units 08/15/23  1417   POTASSIUM mmol/L 4.6   CHLORIDE mmol/L 103   CO2 mmol/L 29   BUN mg/dL 15   CREATININE mg/dL 0.68   CALCIUM mg/dL 9.1   ALBUMIN g/dL 4.4   ALK PHOS U/L 127*   ALT U/L 16   AST U/L 21     Results from last 6 Months   Lab Units 08/15/23  1417   HEP B S AG  Non-reactive   HEP A IGM  Non-reactive   HEP C AB  Non-reactive   HEP B C IGM  Equivocal*     Results from last 6 Months   Lab Units 08/15/23  1417   HIV-1 P24 ANTIGEN-BIOPLEX  Non-Reactive                 Counseling / Coordination of Care  Total time spent today 15 minutes. Greater than 50% of total time was spent with the patient and / or family counseling and / or coordination of care.      Aliza Funk PA-C

## 2023-10-24 ENCOUNTER — TELEPHONE (OUTPATIENT)
Dept: BEHAVIORAL/MENTAL HEALTH CLINIC | Facility: CLINIC | Age: 48
End: 2023-10-24

## 2023-10-24 NOTE — TELEPHONE ENCOUNTER
Pt called office requesting a medication by the name of "Bentyl"  Pt was requesting rx to be sent to "[facility] [address]"  Pt also stated that she had asked provider for this medication yesterday. Writer reviewed chart for both medication and pharmacy and neither of which could be found. Writer notified pt that these could not be found and pt became upset and loud. Writer notified pt that she will call her back after further investigation. Please advise.

## 2023-11-06 ENCOUNTER — SOCIAL WORK (OUTPATIENT)
Dept: PSYCHIATRY | Facility: CLINIC | Age: 48
End: 2023-11-06
Payer: MEDICARE

## 2023-11-06 DIAGNOSIS — R46.89 RISK TAKING BEHAVIOR: ICD-10-CM

## 2023-11-06 DIAGNOSIS — F11.90 OPIOID USE DISORDER: Primary | ICD-10-CM

## 2023-11-06 DIAGNOSIS — F43.10 PTSD (POST-TRAUMATIC STRESS DISORDER): ICD-10-CM

## 2023-11-06 PROCEDURE — 90791 PSYCH DIAGNOSTIC EVALUATION: CPT | Performed by: COUNSELOR
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
## 2023-11-06 NOTE — BH CRISIS PLAN
Client Name: Angelique Funk       Client YOB: 1975  : 1975    Treatment Team (include name and contact information):     Psychotherapist: Nicki Hall     Psychiatrist: Dr. [facility]   Release of information completed: no        Healthcare Provider  [address]  [facility] [address]    Type of Plan   * Child plans (children 15 yo and younger) must be completed and signed by the child's legal guardian   * Plans for all individuals 15 yo and above must be signed by the client. Plan Type: adolescent/adult (15 and over) Initial      My Personal Strengths are (in the client's own words):  "Strong mind"  The stressors and triggers that may put me at risk are:  drug use problems, limited support, ongoing anxiety, and relationship problems    Coping skills I can use to keep myself calm and safe:  Listen to music, Physical activity, and Other (describe) going to park, make myself look good    Coping skills/supports I can use to maintain abstinence from substance use:   Finding healthy activities outside my house, not being bored    The people that provide me with help and support: (Include name, contact, and how they can help)   Support person #1: Patrick Rivero (sister)    * Phone number: in cell phone    * How can they help me?  She will come over when needed to be with me       In the past, the following has helped me in times of crisis:    Going into the hospital and Calling a family member      If it is an emergency and you need immediate help, call     If there is a possibility of danger to yourself or others, call the following crisis hotline resources:     Adult Crisis Numbers  Suicide Prevention Hotline - Dial   [facility]: [address]: [address]: [address]: [address]: [address]: [address]: [address]: [phone] (daytime). [phone] (after hours, weekends, holidays)     Child/Adolescent Crisis Numbers   [facility]: [address]: [phone]   [facility]: [phone]   [facility]: [phone]    Please note: Some [facility] do not have a separate number for Child/Adolescent specific crisis. If your county is not listed under Child/Adolescent, please call the adult number for your county     National Talk to Text Line   All Ages - [phone]    In the event your feelings become unmanageable, and you cannot reach your support system, you will call 911 immediately or go to the nearest hospital emergency room.

## 2023-11-20 ENCOUNTER — OFFICE VISIT (OUTPATIENT)
Dept: OTHER | Age: 48
End: 2023-11-20

## 2023-11-20 ENCOUNTER — TELEPHONE (OUTPATIENT)
Dept: OTHER | Age: 48
End: 2023-11-20

## 2023-11-20 VITALS
BODY MASS INDEX: 28.44 KG/M2 | HEIGHT: 69 IN | DIASTOLIC BLOOD PRESSURE: 74 MMHG | SYSTOLIC BLOOD PRESSURE: 114 MMHG | WEIGHT: 192 LBS | HEART RATE: 62 BPM

## 2023-11-20 DIAGNOSIS — F43.10 PTSD (POST-TRAUMATIC STRESS DISORDER): ICD-10-CM

## 2023-11-20 DIAGNOSIS — F19.90 POLYSUBSTANCE USE DISORDER: ICD-10-CM

## 2023-11-20 DIAGNOSIS — F11.90 OPIOID USE DISORDER: Primary | ICD-10-CM

## 2023-11-20 PROCEDURE — 80307 DRUG TEST PRSMV CHEM ANLYZR: CPT

## 2023-11-20 RX ORDER — BUPRENORPHINE AND NALOXONE 12; 3 MG/1; MG/1
12 FILM, SOLUBLE BUCCAL; SUBLINGUAL 2 TIMES DAILY
Qty: 60 FILM | Refills: 0 | Status: SHIPPED | OUTPATIENT
Start: 2023-11-20 | End: 2023-12-20

## 2023-11-20 RX ORDER — PRAZOSIN HYDROCHLORIDE 2 MG/1
2 CAPSULE ORAL
Qty: 30 CAPSULE | Refills: 0 | Status: SHIPPED | OUTPATIENT
Start: 2023-11-20 | End: 2023-12-20

## 2023-11-20 NOTE — TELEPHONE ENCOUNTER
Called and notified Nadiya Granados that the provider sent the requested prescription to the pharmacy

## 2023-11-20 NOTE — TELEPHONE ENCOUNTER
Ok Ling left a VM on the MAT line requesting a call back because she is having a problem obtaining her Suboxone refill. Called back and left a VM for Ok Ling. MAT office number provided.

## 2023-11-20 NOTE — ASSESSMENT & PLAN NOTE
Patient seen in office for follow up visit  No cravings or reoccurence of use.   Will continue Suboxone   UDS buprenorphine ordered due to inavailability of millenium screen   RTC in 1 month

## 2023-11-20 NOTE — TELEPHONE ENCOUNTER
Medication Refill Request for:  Vistaril 50 mg caps tid    When was the patient last seen by MAT provider?     Have they had any cancellations or no-shows since the last visit? Yes [] No [x]    When is the next appointment scheduled?     Verified pharmacy   [x]    Verified ordering Provider   [x]    Does patient have enough for the next 3 days? Yes [] No [x]    Jean-Pierre Askew called stating that Hari Armas said she did not need a new prescription for her Vistaril, but it appears that it has . Hari Armas, please advise.

## 2023-11-20 NOTE — PROGRESS NOTES
[facility]    Progress Note  Theresa Lopez 57 y.o. female MRN: [id number]  @ Encounter: [id number]      1. Opioid use disorder  Assessment & Plan:  Patient seen in office for follow up visit  No cravings or reoccurence of use. Will continue Suboxone   UDS buprenorphine ordered due to inavailability of millenium screen   RTC in 1 month     Orders:  -     Suboxone  -     buprenorphine-naloxone (Suboxone) 12-3 MG; Place 1 Film (12 mg total) under the tongue 2 (two) times a day    2. PTSD (post-traumatic stress disorder)  -     prazosin (MINIPRESS) 2 mg capsule; Take 1 capsule (2 mg total) by mouth daily at bedtime    3. Polysubstance use disorder  Assessment & Plan:  Endorses to using crack cocaine two days ago  Encourage cessation of all illicit substances             Support Services  Case Management and Certified  are following. Patient was referred to the [facility] Certified Addiction Counselors: Yes  The patient is actively engaged with the [facility] Certified Addiction Counselor’s psychotherapy plan: Yes    buprenorphine-naloxone      PDMP reviewed:     PDMP Review         Value Time User    PDMP Reviewed  Yes 11/20/2023 11:39 AM Anjel Hernandez PA-C            SUBJECTIVE  Patient reports to feeling sick this morning. She appears restless and has rhinorrhea. She denies any opioid use however she reports that she last used two days ago. She states she is attempting to stop her crack cocaine use.      Drug cravings: no   Motivation to continue treatment: yes      Current Outpatient Medications:     buprenorphine-naloxone (Suboxone) 12-3 MG, Place 1 Film (12 mg total) under the tongue 2 (two) times a day, Disp: 60 Film, Rfl: 0    prazosin (MINIPRESS) 2 mg capsule, Take 1 capsule (2 mg total) by mouth daily at bedtime, Disp: 30 capsule, Rfl: 0    ALPRAZolam (XANAX) 1 mg tablet, Take 1 mg by mouth 4 (four) times a day as needed for anxiety (Patient not taking: Reported on 8/28/2023), Disp: , Rfl:     aluminum-magnesium hydroxide-simethicone (MAALOX MAX) 400-400-40 MG/5ML suspension, Take 10 mL by mouth every 6 (six) hours as needed for indigestion or heartburn (nausea), Disp: 355 mL, Rfl: 0    famotidine (PEPCID) 40 MG tablet, Take 1 tablet (40 mg total) by mouth daily at bedtime as needed for heartburn, Disp: 20 tablet, Rfl: 0    guaiFENesin (MUCINEX) 600 mg 12 hr tablet, Take 1 tablet (600 mg total) by mouth every 12 (twelve) hours, Disp: 14 tablet, Rfl: 0    hydrOXYzine HCL (ATARAX) 25 mg tablet, Take 2 tablets (50 mg total) by mouth every 6 (six) hours as needed for anxiety, Disp: 120 tablet, Rfl: 0    hydrOXYzine pamoate (VISTARIL) 50 mg capsule, Take 1 capsule (50 mg total) by mouth 3 (three) times a day, Disp: 90 capsule, Rfl: 1    ibuprofen (MOTRIN) 600 mg tablet, Take 1 tablet (600 mg total) by mouth every 6 (six) hours as needed for mild pain, Disp: 40 tablet, Rfl: 0    magnesium oxide (MAG-OX) 400 mg, Take 2 tablets (800 mg total) by mouth 2 (two) times a day for 7 days, Disp: 28 tablet, Rfl: 0    methimazole (TAPAZOLE) 5 mg tablet, Take 2.5 mg by mouth daily, Disp: , Rfl:     mirtazapine (REMERON SOL-TAB) 30 mg dispersible tablet, Take 30 mg by mouth (Patient not taking: Reported on 8/18/2022), Disp: , Rfl:     Multiple Vitamin (multivitamin) tablet, Take 1 tablet by mouth daily, Disp: , Rfl:     naloxone (NARCAN) 4 mg/0.1 mL nasal spray, Administer 1 spray into a nostril. If no response after 2-3 minutes, give another dose in the other nostril using a new spray., Disp: 1 each, Rfl: 5    omeprazole (PriLOSEC) 40 MG capsule, , Disp: , Rfl:     ziprasidone (GEODON) 80 mg capsule, Take 80 mg by mouth daily at bedtime, Disp: , Rfl:     ziprasidone (GEODON) 80 mg capsule, Take 40 mg by mouth every morning, Disp: , Rfl:     Objective     Vitals:    11/20/23 1143   BP: 114/74   Pulse: 62       Physical Exam  Vitals and nursing note reviewed.    Constitutional:       General: She is not in acute distress. Appearance: She is diaphoretic. Eyes:      General: No scleral icterus. Pupils: Pupils are equal, round, and reactive to light. Cardiovascular:      Rate and Rhythm: Normal rate and regular rhythm. Heart sounds: No murmur heard. Pulmonary:      Effort: No respiratory distress. Breath sounds: Normal breath sounds. Neurological:      Mental Status: She is alert and oriented to person, place, and time. Psychiatric:         Mood and Affect: Mood is anxious. Mood is not depressed. Lab Results:   Results from last 6 Months   Lab Units 08/15/23  1417   WBC Thousand/uL 6.38   HEMOGLOBIN g/dL 13.4   HEMATOCRIT % 41.4   PLATELETS Thousands/uL 293      Results from last 6 Months   Lab Units 08/15/23  1417   POTASSIUM mmol/L 4.6   CHLORIDE mmol/L 103   CO2 mmol/L 29   BUN mg/dL 15   CREATININE mg/dL 0.68   CALCIUM mg/dL 9.1   ALBUMIN g/dL 4.4   ALK PHOS U/L 127*   ALT U/L 16   AST U/L 21     Results from last 6 Months   Lab Units 08/15/23  1417   HEP B S AG  Non-reactive   HEP A IGM  Non-reactive   HEP C AB  Non-reactive   HEP B C IGM  Equivocal*     Results from last 6 Months   Lab Units 08/15/23  1417   HIV-1 P24 ANTIGEN-BIOPLEX  Non-Reactive                 Counseling / Coordination of Care  Total time spent today 15 minutes. Greater than 50% of total time was spent with the patient and / or family counseling and / or coordination of care.      Travis Robin PA-C

## 2023-11-25 LAB
BUPRENORPHINE UR CFM-MCNC: 106 NG/ML
BUPRENORPHINE UR QL CFM: NORMAL NG/ML
BUPRENORPHINE UR QL CFM: POSITIVE
BUPRENORPHINE+NOR UR QL: POSITIVE
NORBUPRENORPHINE UR CFM-MCNC: 354 NG/ML
NORBUPRENORPHINE UR QL CFM: POSITIVE

## 2023-11-27 RX ORDER — HYDROXYZINE PAMOATE 50 MG/1
50 CAPSULE ORAL 3 TIMES DAILY
Qty: 90 CAPSULE | Refills: 1 | Status: SHIPPED | OUTPATIENT
Start: 2023-11-27 | End: 2024-01-26

## 2023-12-18 ENCOUNTER — OFFICE VISIT (OUTPATIENT)
Dept: OTHER | Age: 48
End: 2023-12-18

## 2023-12-18 VITALS
SYSTOLIC BLOOD PRESSURE: 120 MMHG | DIASTOLIC BLOOD PRESSURE: 82 MMHG | BODY MASS INDEX: 28.58 KG/M2 | HEART RATE: 64 BPM | HEIGHT: 69 IN | WEIGHT: 193 LBS

## 2023-12-18 DIAGNOSIS — F11.90 OPIOID USE DISORDER: Primary | ICD-10-CM

## 2023-12-18 RX ORDER — BUPRENORPHINE AND NALOXONE 12; 3 MG/1; MG/1
12 FILM, SOLUBLE BUCCAL; SUBLINGUAL 2 TIMES DAILY
Qty: 60 FILM | Refills: 0 | Status: SHIPPED | OUTPATIENT
Start: 2023-12-18 | End: 2024-01-17

## 2023-12-18 NOTE — PROGRESS NOTES
[facility]    Progress Note  Mireya Gandhi 48 y.o. female MRN: [id number]  @ Encounter: [id number]      1. Opioid use disorder  Assessment & Plan:  Patient presents for follow up appointment   Doing well on current Suboxone denies any craving or reoccurrence of use   Urine Buprenorphine collected last visit and shows compliance with Suboxone   PDMP reviewed   Will continue engagement in [facility] program   RTC in 1 month             Support Services  Case Management and Certified  are following.   Patient was referred to the [facility] Certified Addiction Counselors: No  The patient is actively engaged with the [facility] Certified Addiction Counselor’s psychotherapy plan: No    buprenorphine-naloxone      PDMP reviewed:     PDMP Review         Value Time User    PDMP Reviewed  Yes 12/18/2023  2:56 PM Brigette Davis PA-C            SUBJECTIVE  Patient seen in office for follow up visit. Reports she is doing well on current Suboxone dosing denies any cravings. Patient reports she is now established with outpatient psychiatry and he will continue to follow to fill her psychiatric medication at this time. She is engaged in [facility] psychotherapy and wishes to continue this service     Drug cravings: none  Motivation to continue treatment: high      Current Outpatient Medications:     ALPRAZolam (XANAX) 1 mg tablet, Take 1 mg by mouth 4 (four) times a day as needed for anxiety (Patient not taking: Reported on 8/28/2023), Disp: , Rfl:     aluminum-magnesium hydroxide-simethicone (MAALOX MAX) 400-400-40 MG/5ML suspension, Take 10 mL by mouth every 6 (six) hours as needed for indigestion or heartburn (nausea), Disp: 355 mL, Rfl: 0    buprenorphine-naloxone (Suboxone) 12-3 MG, Place 1 Film (12 mg total) under the tongue 2 (two) times a day, Disp: 60 Film, Rfl: 0    famotidine (PEPCID) 40 MG tablet, Take 1 tablet (40 mg total) by mouth daily at bedtime as needed for heartburn, Disp: 20  tablet, Rfl: 0    guaiFENesin (MUCINEX) 600 mg 12 hr tablet, Take 1 tablet (600 mg total) by mouth every 12 (twelve) hours, Disp: 14 tablet, Rfl: 0    hydrOXYzine HCL (ATARAX) 25 mg tablet, Take 2 tablets (50 mg total) by mouth every 6 (six) hours as needed for anxiety, Disp: 120 tablet, Rfl: 0    hydrOXYzine pamoate (VISTARIL) 50 mg capsule, Take 1 capsule (50 mg total) by mouth 3 (three) times a day, Disp: 90 capsule, Rfl: 1    ibuprofen (MOTRIN) 600 mg tablet, Take 1 tablet (600 mg total) by mouth every 6 (six) hours as needed for mild pain, Disp: 40 tablet, Rfl: 0    magnesium oxide (MAG-OX) 400 mg, Take 2 tablets (800 mg total) by mouth 2 (two) times a day for 7 days, Disp: 28 tablet, Rfl: 0    methimazole (TAPAZOLE) 5 mg tablet, Take 2.5 mg by mouth daily, Disp: , Rfl:     mirtazapine (REMERON SOL-TAB) 30 mg dispersible tablet, Take 30 mg by mouth (Patient not taking: Reported on 8/18/2022), Disp: , Rfl:     Multiple Vitamin (multivitamin) tablet, Take 1 tablet by mouth daily, Disp: , Rfl:     naloxone (NARCAN) 4 mg/0.1 mL nasal spray, Administer 1 spray into a nostril. If no response after 2-3 minutes, give another dose in the other nostril using a new spray., Disp: 1 each, Rfl: 5    omeprazole (PriLOSEC) 40 MG capsule, , Disp: , Rfl:     prazosin (MINIPRESS) 2 mg capsule, Take 1 capsule (2 mg total) by mouth daily at bedtime, Disp: 30 capsule, Rfl: 0    ziprasidone (GEODON) 80 mg capsule, Take 80 mg by mouth daily at bedtime, Disp: , Rfl:     ziprasidone (GEODON) 80 mg capsule, Take 40 mg by mouth every morning, Disp: , Rfl:     Objective     There were no vitals filed for this visit.    Physical Exam  Vitals and nursing note reviewed.   Constitutional:       General: She is not in acute distress.     Appearance: She is not diaphoretic.   Neurological:      Mental Status: She is alert.   Psychiatric:         Mood and Affect: Mood is not anxious or depressed.              Lab Results:   Results from last 6  Months   Lab Units 08/15/23  1417   WBC Thousand/uL 6.38   HEMOGLOBIN g/dL 13.4   HEMATOCRIT % 41.4   PLATELETS Thousands/uL 293      Results from last 6 Months   Lab Units 08/15/23  1417   POTASSIUM mmol/L 4.6   CHLORIDE mmol/L 103   CO2 mmol/L 29   BUN mg/dL 15   CREATININE mg/dL 0.68   CALCIUM mg/dL 9.1   ALBUMIN g/dL 4.4   ALK PHOS U/L 127*   ALT U/L 16   AST U/L 21     Results from last 6 Months   Lab Units 08/15/23  1417   HEP B S AG  Non-reactive   HEP A IGM  Non-reactive   HEP C AB  Non-reactive   HEP B C IGM  Equivocal*     Results from last 6 Months   Lab Units 08/15/23  1417   HIV-1 P24 ANTIGEN-BIOPLEX  Non-Reactive                 Counseling / Coordination of Care  Total time spent today 15 minutes. Greater than 50% of total time was spent with the patient and / or family counseling and / or coordination of care.     Brigette Davis PA-C

## 2023-12-18 NOTE — ASSESSMENT & PLAN NOTE
Patient presents for follow up appointment   Doing well on current Suboxone denies any craving or reoccurrence of use   Urine Buprenorphine collected last visit and shows compliance with Suboxone   PDMP reviewed   Will continue engagement in [facility] program   RTC in 1 month

## 2023-12-21 ENCOUNTER — SOCIAL WORK (OUTPATIENT)
Dept: PSYCHIATRY | Facility: CLINIC | Age: 48
End: 2023-12-21
Payer: MEDICARE

## 2023-12-21 DIAGNOSIS — F39 MOOD DISORDER (HCC): ICD-10-CM

## 2023-12-21 DIAGNOSIS — F11.90 OPIOID USE DISORDER: ICD-10-CM

## 2023-12-21 DIAGNOSIS — F43.10 PTSD (POST-TRAUMATIC STRESS DISORDER): Primary | ICD-10-CM

## 2023-12-21 PROCEDURE — 90832 PSYTX W PT 30 MINUTES: CPT | Performed by: COUNSELOR
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
## 2024-01-01 RX ORDER — PRAZOSIN HYDROCHLORIDE 2 MG/1
CAPSULE ORAL
Qty: 30 CAPSULE | Refills: 0 | OUTPATIENT
Start: 2024-01-01

## 2024-01-04 ENCOUNTER — SOCIAL WORK (OUTPATIENT)
Dept: PSYCHIATRY | Facility: CLINIC | Age: 49
End: 2024-01-04

## 2024-01-04 ENCOUNTER — TELEPHONE (OUTPATIENT)
Dept: OTHER | Age: 49
End: 2024-01-04

## 2024-01-04 DIAGNOSIS — F11.90 OPIOID USE DISORDER: ICD-10-CM

## 2024-01-04 DIAGNOSIS — F39 MOOD DISORDER (HCC): Primary | ICD-10-CM

## 2024-01-04 PROCEDURE — NOSHOW: Performed by: COUNSELOR

## 2024-01-04 NOTE — PSYCH
No Call. No Show. No Charge    Mireya Gandhi no showed 01/04/24 appointment , staff called and left message to reschedule appointment     Treatment Plan not completed within required time limits due to: Mireya Gandhi no show appointment on 01/04/24

## 2024-01-04 NOTE — TELEPHONE ENCOUNTER
Mireya called to cancel/reschedule her appt today with sadi Corley.  Informed Mireya that her appt was at 2 pm.  She apologized and rescheduled for Monday, 1/8/24.    For your review.

## 2024-01-09 ENCOUNTER — TELEPHONE (OUTPATIENT)
Dept: PSYCHIATRY | Facility: CLINIC | Age: 49
End: 2024-01-09

## 2024-01-09 NOTE — TELEPHONE ENCOUNTER
Mireya called this writer crying stating her SO passed away and she was having difficulties with his family. Therapist provided liberal emotional support. Mireya rescheduled her missed appointment.

## 2024-01-15 DIAGNOSIS — F11.90 OPIOID USE DISORDER: ICD-10-CM

## 2024-01-15 RX ORDER — BUPRENORPHINE AND NALOXONE 12; 3 MG/1; MG/1
12 FILM, SOLUBLE BUCCAL; SUBLINGUAL 2 TIMES DAILY
Qty: 60 FILM | Refills: 0 | Status: SHIPPED | OUTPATIENT
Start: 2024-01-15 | End: 2024-02-14

## 2024-01-15 NOTE — TELEPHONE ENCOUNTER
Called and notified Mireya that the provider sent the requested medication to the requested pharmacy.  She was very appreciative.

## 2024-01-15 NOTE — TELEPHONE ENCOUNTER
Medication Refill Request for:  Suboxone 12-3 mg film    When was the patient last seen by MAT provider?  12/18/23    Have they had any cancellations or no-shows since the last visit? Yes [x] No []  1/15/24, but rescheduled    When is the next appointment scheduled?   1/29/24    Verified pharmacy   [x]    Verified ordering Provider   [x]    Does patient have enough for the next 3 days? Yes [] No [x]    Brigette, please advise

## 2024-01-23 ENCOUNTER — TELEPHONE (OUTPATIENT)
Dept: BEHAVIORAL/MENTAL HEALTH CLINIC | Facility: CLINIC | Age: 49
End: 2024-01-23

## 2024-01-29 ENCOUNTER — TELEPHONE (OUTPATIENT)
Dept: BEHAVIORAL/MENTAL HEALTH CLINIC | Facility: CLINIC | Age: 49
End: 2024-01-29

## 2024-01-29 NOTE — TELEPHONE ENCOUNTER
Patient is calling regarding cancelling an appointment.    Date/Time:   Taurus Carvajal 1/29 at 2pm  Brigettefloridalma Davis 1/29 at 3:15    Reason: patient sick     Patient was rescheduled: YES [x] NO []  If yes, when was Patient reschedule for:   Taurus Carvajal 2/8 at 2pm  Brigette Susan 2/8 at 3pm

## 2024-02-08 ENCOUNTER — SOCIAL WORK (OUTPATIENT)
Dept: PSYCHIATRY | Facility: CLINIC | Age: 49
End: 2024-02-08

## 2024-02-08 ENCOUNTER — OFFICE VISIT (OUTPATIENT)
Dept: OTHER | Age: 49
End: 2024-02-08
Payer: MEDICARE

## 2024-02-08 VITALS
HEIGHT: 69 IN | DIASTOLIC BLOOD PRESSURE: 80 MMHG | WEIGHT: 183 LBS | SYSTOLIC BLOOD PRESSURE: 130 MMHG | HEART RATE: 80 BPM | BODY MASS INDEX: 27.11 KG/M2

## 2024-02-08 DIAGNOSIS — F39 MOOD DISORDER (HCC): ICD-10-CM

## 2024-02-08 DIAGNOSIS — F11.90 OPIOID USE DISORDER: Primary | ICD-10-CM

## 2024-02-08 DIAGNOSIS — Z63.4 RECENT BEREAVEMENT: ICD-10-CM

## 2024-02-08 DIAGNOSIS — F43.10 PTSD (POST-TRAUMATIC STRESS DISORDER): ICD-10-CM

## 2024-02-08 DIAGNOSIS — F19.90 POLYSUBSTANCE USE DISORDER: Primary | ICD-10-CM

## 2024-02-08 PROCEDURE — 99213 OFFICE O/P EST LOW 20 MIN: CPT

## 2024-02-08 RX ORDER — BUPRENORPHINE AND NALOXONE 12; 3 MG/1; MG/1
12 FILM, SOLUBLE BUCCAL; SUBLINGUAL 2 TIMES DAILY
Qty: 60 FILM | Refills: 0 | Status: SHIPPED | OUTPATIENT
Start: 2024-02-08 | End: 2024-03-09

## 2024-02-08 SDOH — SOCIAL STABILITY - SOCIAL INSECURITY: DISSAPEARANCE AND DEATH OF FAMILY MEMBER: Z63.4

## 2024-02-08 NOTE — PROGRESS NOTES
[facility]    Progress Note  Mireya Gandhi 48 y.o. female MRN: [id number]  @ Encounter: [id number]      1. Opioid use disorder  Assessment & Plan:  Denies any cravings or re-occurrence of any opioids while taking suboxone  Current dosage is 12 mg BID.  Patient does admit to co-current use of crack cocaine.   Unable to order millenium screen due to insurance.   PDMP reviewed  Due to ongoing substance use F/U in 1 month     Orders:  -     buprenorphine-naloxone (Suboxone) 12-3 MG; Place 1 Film (12 mg total) under the tongue 2 (two) times a day    2. Recent bereavement  Assessment & Plan:  Patient reports struggling with recent passing of her boyfriend in January  States that she is following with therapist and psychiatrist. Her psychiatrist had started her on Xanax 0.5mg TID. Discussed the risk of taking benzodiazepines and suboxone with patient. She reports this medication is only for short term.   Continue psychotherapy through [facility]             Support Services  Case Management and Certified  are following.   Patient was referred to the [facility] Certified Addiction Counselors: Yes  The patient is actively engaged with the [facility] Certified Addiction Counselor’s psychotherapy plan: Yes    buprenorphine-naloxone      PDMP reviewed:     PDMP Review         Value Time User    PDMP Reviewed  Yes 2/8/2024  2:54 PM Brigette Davis PA-C            SUBJECTIVE  Patient seen and examined in office. She reports that she struggled this last month due to recent passing of her boyfriend. She states she is taking her suboxone however is reporting intermittent use of crack cocaine every 3-4 days. Patient has missed therapy appointments, but states she will be now attending virtual appointments. Due to the recent passing of her boyfriend patient disclosed that her psychiatrist had started her on Xanax. She reports that this medication is only for short term and he only prescribes  10 days of time. She understands the risk of benzodiazepines while taking Suboxone.     Drug cravings: mild  Motivation to continue treatment: high       Current Outpatient Medications:     ALPRAZolam (XANAX) 1 mg tablet, Take 1 mg by mouth 4 (four) times a day as needed for anxiety, Disp: , Rfl:     buprenorphine-naloxone (Suboxone) 12-3 MG, Place 1 Film (12 mg total) under the tongue 2 (two) times a day, Disp: 60 Film, Rfl: 0    methimazole (TAPAZOLE) 5 mg tablet, Take 2.5 mg by mouth daily, Disp: , Rfl:     Multiple Vitamin (multivitamin) tablet, Take 1 tablet by mouth daily, Disp: , Rfl:     naloxone (NARCAN) 4 mg/0.1 mL nasal spray, Administer 1 spray into a nostril. If no response after 2-3 minutes, give another dose in the other nostril using a new spray., Disp: 1 each, Rfl: 5    omeprazole (PriLOSEC) 40 MG capsule, , Disp: , Rfl:     ziprasidone (GEODON) 80 mg capsule, Take 80 mg by mouth daily at bedtime, Disp: , Rfl:     ziprasidone (GEODON) 80 mg capsule, Take 40 mg by mouth every morning, Disp: , Rfl:     aluminum-magnesium hydroxide-simethicone (MAALOX MAX) 400-400-40 MG/5ML suspension, Take 10 mL by mouth every 6 (six) hours as needed for indigestion or heartburn (nausea), Disp: 355 mL, Rfl: 0    famotidine (PEPCID) 40 MG tablet, Take 1 tablet (40 mg total) by mouth daily at bedtime as needed for heartburn, Disp: 20 tablet, Rfl: 0    guaiFENesin (MUCINEX) 600 mg 12 hr tablet, Take 1 tablet (600 mg total) by mouth every 12 (twelve) hours, Disp: 14 tablet, Rfl: 0    hydrOXYzine HCL (ATARAX) 25 mg tablet, Take 2 tablets (50 mg total) by mouth every 6 (six) hours as needed for anxiety, Disp: 120 tablet, Rfl: 0    hydrOXYzine pamoate (VISTARIL) 50 mg capsule, Take 1 capsule (50 mg total) by mouth 3 (three) times a day, Disp: 90 capsule, Rfl: 1    ibuprofen (MOTRIN) 600 mg tablet, Take 1 tablet (600 mg total) by mouth every 6 (six) hours as needed for mild pain, Disp: 40 tablet, Rfl: 0    magnesium oxide  (MAG-OX) 400 mg, Take 2 tablets (800 mg total) by mouth 2 (two) times a day for 7 days, Disp: 28 tablet, Rfl: 0    mirtazapine (REMERON SOL-TAB) 30 mg dispersible tablet, Take 30 mg by mouth (Patient not taking: Reported on 8/18/2022), Disp: , Rfl:     prazosin (MINIPRESS) 2 mg capsule, Take 1 capsule (2 mg total) by mouth daily at bedtime, Disp: 30 capsule, Rfl: 0    Objective     Vitals:    02/08/24 1438   BP: 130/80   Pulse: 80       Physical Exam  Vitals reviewed.   Neurological:      Mental Status: She is oriented to person, place, and time.   Psychiatric:         Mood and Affect: Mood is anxious and depressed.         Thought Content: Thought content does not include suicidal ideation. Thought content does not include suicidal plan.              Lab Results:   Results from last 6 Months   Lab Units 08/15/23  1417   WBC Thousand/uL 6.38   HEMOGLOBIN g/dL 13.4   HEMATOCRIT % 41.4   PLATELETS Thousands/uL 293      Results from last 6 Months   Lab Units 08/15/23  1417   POTASSIUM mmol/L 4.6   CHLORIDE mmol/L 103   CO2 mmol/L 29   BUN mg/dL 15   CREATININE mg/dL 0.68   CALCIUM mg/dL 9.1   ALBUMIN g/dL 4.4   ALK PHOS U/L 127*   ALT U/L 16   AST U/L 21     Results from last 6 Months   Lab Units 08/15/23  1417   HEP B S AG  Non-reactive   HEP A IGM  Non-reactive   HEP C AB  Non-reactive   HEP B C IGM  Equivocal*     Results from last 6 Months   Lab Units 08/15/23  1417   HIV-1 P24 ANTIGEN-BIOPLEX  Non-Reactive                 Counseling / Coordination of Care  Total time spent today 30 minutes. Greater than 50% of total time was spent with the patient and / or family counseling and / or coordination of care.     Brigette Davis PA-C

## 2024-02-08 NOTE — ASSESSMENT & PLAN NOTE
Patient reports struggling with recent passing of her boyfriend in January States that she is following with therapist and psychiatrist. Her psychiatrist had started her on Xanax 0.5mg TID. Discussed the risk of taking benzodiazepines and suboxone with patient. She reports this medication is only for short term.   Continue psychotherapy through [facility]

## 2024-02-08 NOTE — ASSESSMENT & PLAN NOTE
Denies any cravings or re-occurrence of any opioids while taking suboxone  Current dosage is 12 mg BID.  Patient does admit to co-current use of crack cocaine.   Unable to order millenium screen due to insurance.   PDMP reviewed  Due to ongoing substance use F/U in 1 month

## 2024-02-08 NOTE — PSYCH
No Call. No Show. No Charge    Mireya Gandhi no showed 02/08/24 appointment , staff called and left message to reschedule appointment     Treatment Plan not completed within required time limits due to: Mireya Gandhi no show appointment on 02/08/24

## 2024-02-14 ENCOUNTER — TELEMEDICINE (OUTPATIENT)
Dept: PSYCHIATRY | Facility: CLINIC | Age: 49
End: 2024-02-14

## 2024-02-14 DIAGNOSIS — F39 MOOD DISORDER (HCC): ICD-10-CM

## 2024-02-14 DIAGNOSIS — F43.10 PTSD (POST-TRAUMATIC STRESS DISORDER): ICD-10-CM

## 2024-02-14 DIAGNOSIS — Z63.4 RECENT BEREAVEMENT: ICD-10-CM

## 2024-02-14 DIAGNOSIS — F11.90 OPIOID USE DISORDER: Primary | ICD-10-CM

## 2024-02-14 SDOH — SOCIAL STABILITY - SOCIAL INSECURITY: DISSAPEARANCE AND DEATH OF FAMILY MEMBER: Z63.4

## 2024-02-14 NOTE — BH TREATMENT PLAN
Outpatient Behavioral Health Psychotherapy Treatment Plan    Mireya Gandhi  1975     Date of Initial Psychotherapy Assessment: 11/06/2024   Date of Current Treatment Plan: 02/14/24  Treatment Plan Target Date: 8/14/2024  Treatment Plan Expiration Date: 8/14/2024    Diagnosis:   1. Opioid use disorder        2. PTSD (post-traumatic stress disorder)        3. Mood disorder (HCC)        4. Recent bereavement            Area(s) of Need: mood issues. Cocaine use, recent passing of Significant other    Long Term Goal 1 (in the client's own words): Learning to deal with my feelings in a healthy way    Stage of Change: Action    Target Date for completion: 8/14/2024     Anticipated therapeutic modalities: CBT, supportive counseling     People identified to complete this goal: sadi Gomes      Objective 1: (identify the means of measuring success in meeting the objective): Be able to identify my feelings and how they are affecting me      Objective 2: (identify the means of measuring success in meeting the objective): Have learned healthy coping skills and take my medications      Long Term Goal 2 (in the client's own words): I have been off opiates for some time but I want to stop smoking crack.     Stage of Change: Contemplation    Target Date for completion: 8/14/2024     Anticipated therapeutic modalities: motivational interviewing, CBT/Relapse prevention     People identified to complete this goal: sadi Gomes, [facility] provider      Objective 1: (identify the means of measuring success in meeting the objective): I will be honest about my crack use and learn my triggers      Objective 2: (identify the means of measuring success in meeting the objective): I will learn healthy coping skills that keep me from using crack     Long Term Goal 3 (in the client's own words): I want to get through my grief and feel better    Stage of Change: Action    Target Date for completion: 8/14/2024     Anticipated  therapeutic modalities: CBT, grief counseling     People identified to complete this goal: sadi Gomes      Objective 1: (identify the means of measuring success in meeting the objective): I want to talk about my experience finding my significant other having passed and learn to express my feelings      Objective 2: (identify the means of measuring success in meeting the objective): I will be able to identify my feelings and learn to feel them     I am currently under the care of a [facility] psychiatric provider: no    My [facility] psychiatric provider is: n/a    I am currently taking psychiatric medications: Yes, as prescribed    I feel that I will be ready for discharge from mental health care when I reach the following (measurable goal/objective): I will have stopped my substance abuse for at least one year and will have at least 5 healthy skills to handle my distressful moods.    For children and adults who have a legal guardian:   Has there been any change to custody orders and/or guardianship status? NA. If yes, attach updated documentation.    I have created my Crisis Plan and have been offered a copy of this plan    Behavioral Health Treatment Plan [facility]: Diagnosis and Treatment Plan explained to Mireya Gandhi acknowledges an understanding of their diagnosis. Mireya Gandhi agrees to this treatment plan.    I have been offered a copy of this Treatment Plan. yes

## 2024-02-14 NOTE — PSYCH
Behavioral Health Psychotherapy Progress Note    Psychotherapy Provided: Individual Psychotherapy     1. Opioid use disorder        2. PTSD (post-traumatic stress disorder)        3. Mood disorder (HCC)        4. Recent bereavement            Goals addressed in session: Goal 1, Goal 2, and Goal 3      DATA: Mireya was seen for a telehealth session. Mireya began session by discussing her feelings about her son and how she is angry that he has not reached out to her in a few months. Therapist had Mireya explore these feelings and discuss her beliefs about her son's motivation. Mireya was able to recognize she had not reached out to her son either and that her anger was causing her to not reach out.  Mireya stated she would reach out. Mireya discussed the passing of SO to an overdose. Mireya stated she believed he had passed after inadvertently using fentanyl while smoking crack cocaine . Therapsit highlighted that Mireya also smoke crack cocaine and stressed that Mireya could also be in danger. Mireya minimized this concern, stating she purchases from people she has known for some time. Mireya stated she struggles to leave the house. Therapist discussed with Mireya reasons she would leave the house and activities Mireya had enjoyed in the past. Mireya identified stated she loves music and had attended  [facility] clubhouse in the past.  Mireya stated she felt obtaining a  would assist with her needs and make her feel less overwhelmed. Mireya and therapist created and signed treatment plan. Treatment plan was sent to Mireya virtually for signing.  Mireya scheduled a session for next week.  During this session, this clinician used the following therapeutic modalities: Client-centered Therapy, Cognitive Behavioral Therapy, Motivational Interviewing, and Supportive Psychotherapy    Substance Abuse was addressed during this session. If the client is diagnosed with a co-occurring  "substance use disorder, please indicate any changes in the frequency or amount of use: Mireya reports she used crack cocaine about 3 days ago. Mireya states she continues to take her suboxone and not use opiates. Therapist utilized stage appropriate interventions to address this substance abuse issue.  Mireya struggled to recognize the dangers of her use of crack and struggled to develop motivation to change this behavior. Mireya states she understands she should stop and wants to stop crack cocaine use but feels she can use successfully.  Therapist engaged Mireya in a discussion regarding the reasons she should stop. Mireya stated she would have more money and be able to participate in activities she had enjoyed in the past. Stage of change for addressing substance use diagnoses: Pre-contemplation    ASSESSMENT:  Mireya Gandhi presents with a Euthymic/ normal mood. Mireya was talkative and engaged in therapy.      her affect is Normal range and intensity, which is congruent, with her mood and the content of the session. The client has made progress on their goals.     Mireya Gandhi presents with a none risk of suicide, none risk of self-harm, and none risk of harm to others.    For any risk assessment that surpasses a \"low\" rating, a safety plan must be developed.    A safety plan was indicated: no  If yes, describe in detail n/a    PLAN: Between sessions, Mireya Gandhi will explore classes and programs at [facility] and the recovery center. Mireya will apply for mental health .  At the next session, the therapist will use Client-centered Therapy, Cognitive Behavioral Therapy, Motivational Interviewing, and Supportive Psychotherapy to address Mireya's understanding of her cocaine use and motivation to change and to address Mireya's grief and depressed mood needs.    Behavioral Health Treatment Plan and Discharge Planning: Mireya Gandhi is aware of and agrees to " continue to work on their treatment plan. They have identified and are working toward their discharge goals. yes    Visit start and stop times:    02/14/24  Start Time: 1505  Stop Time: 1549  Total Visit Time: 44 minutes  Virtual Regular Visit    Verification of patient location:    Patient is located at Home in the following state in which I hold an active license [place]      Assessment/Plan:    Problem List Items Addressed This Visit       Mood disorder (HCC)    Opioid use disorder - Primary    PTSD (post-traumatic stress disorder)    Recent bereavement       Goals addressed in session: Goal 1, Goal 2, and Goal 3           Reason for visit is   Chief Complaint   Patient presents with    Virtual Regular Visit          Encounter provider Taurus Carvajal    Provider located at [facility] MEDICATION-ASSISTED TREATMENT [address] [id number] [phone]      Recent Visits  No visits were found meeting these conditions.  Showing recent visits within past 7 days and meeting all other requirements  Today's Visits  Date Type Provider Dept   02/14/24 Telemedicine Taurus Carvajal [facility]   Showing today's visits and meeting all other requirements  Future Appointments  No visits were found meeting these conditions.  Showing future appointments within next 150 days and meeting all other requirements       The patient was identified by name and date of birth. Mireya Oksana was informed that this is a telemedicine visit and that the visit is being conducted throughthe judo platform. She agrees to proceed..  My office door was closed. No one else was in the room.  She acknowledged consent and understanding of privacy and security of the video platform. The patient has agreed to participate and understands they can discontinue the visit at any time.    Patient is aware this is a billable service.     Subjective  Mireya  Oksana is a 48 y.o. female was seen for a telehealth session .      HPI     Past Medical History:   Diagnosis Date    Cardiomyopathy (HCC)     Elevated transaminase measurement     Irritable bowel syndrome (IBS)     with Constipation    Nutritional deficiency     Overactive thyroid gland     Palpitations     Sweating abnormality     Vaginal infection     Vitamin D insufficiency        Past Surgical History:   Procedure Laterality Date    ANKLE SURGERY Left     FINGER SURGERY      TUBAL LIGATION      UPPER GASTROINTESTINAL ENDOSCOPY         Current Outpatient Medications   Medication Sig Dispense Refill    ALPRAZolam (XANAX) 1 mg tablet Take 1 mg by mouth 4 (four) times a day as needed for anxiety      aluminum-magnesium hydroxide-simethicone (MAALOX MAX) 400-400-40 MG/5ML suspension Take 10 mL by mouth every 6 (six) hours as needed for indigestion or heartburn (nausea) 355 mL 0    buprenorphine-naloxone (Suboxone) 12-3 MG Place 1 Film (12 mg total) under the tongue 2 (two) times a day 60 Film 0    famotidine (PEPCID) 40 MG tablet Take 1 tablet (40 mg total) by mouth daily at bedtime as needed for heartburn 20 tablet 0    guaiFENesin (MUCINEX) 600 mg 12 hr tablet Take 1 tablet (600 mg total) by mouth every 12 (twelve) hours 14 tablet 0    hydrOXYzine HCL (ATARAX) 25 mg tablet Take 2 tablets (50 mg total) by mouth every 6 (six) hours as needed for anxiety 120 tablet 0    hydrOXYzine pamoate (VISTARIL) 50 mg capsule Take 1 capsule (50 mg total) by mouth 3 (three) times a day 90 capsule 1    ibuprofen (MOTRIN) 600 mg tablet Take 1 tablet (600 mg total) by mouth every 6 (six) hours as needed for mild pain 40 tablet 0    magnesium oxide (MAG-OX) 400 mg Take 2 tablets (800 mg total) by mouth 2 (two) times a day for 7 days 28 tablet 0    methimazole (TAPAZOLE) 5 mg tablet Take 2.5 mg by mouth daily      mirtazapine (REMERON SOL-TAB) 30 mg dispersible tablet Take 30 mg by mouth (Patient not taking: Reported on 8/18/2022)       Multiple Vitamin (multivitamin) tablet Take 1 tablet by mouth daily      naloxone (NARCAN) 4 mg/0.1 mL nasal spray Administer 1 spray into a nostril. If no response after 2-3 minutes, give another dose in the other nostril using a new spray. 1 each 5    omeprazole (PriLOSEC) 40 MG capsule       prazosin (MINIPRESS) 2 mg capsule Take 1 capsule (2 mg total) by mouth daily at bedtime 30 capsule 0    ziprasidone (GEODON) 80 mg capsule Take 80 mg by mouth daily at bedtime      ziprasidone (GEODON) 80 mg capsule Take 40 mg by mouth every morning       No current facility-administered medications for this visit.        Allergies   Allergen Reactions    Gabapentin Anaphylaxis    Hydrocodone Hives    Adhesive [Medical Tape] Rash       Review of Systems    Video Exam    There were no vitals filed for this visit.    Physical Exam     Visit Time    Visit Start Time: 1505  Visit Stop Time: 1549  Total Visit Duration:  44 minutes

## 2024-02-19 ENCOUNTER — TELEPHONE (OUTPATIENT)
Dept: PSYCHIATRY | Facility: CLINIC | Age: 49
End: 2024-02-19

## 2024-02-19 NOTE — TELEPHONE ENCOUNTER
Left voicemail informing patient of the Psych Encounter form needing to be signed as a requirement from the insurance company for billing purposes. Patient can access form via SendGridt and sign electronically.     Please make patient aware this form must be signed for each visit as a requirement to continue future visits with provider.

## 2024-02-23 ENCOUNTER — TELEPHONE (OUTPATIENT)
Dept: BEHAVIORAL/MENTAL HEALTH CLINIC | Facility: CLINIC | Age: 49
End: 2024-02-23

## 2024-02-23 NOTE — TELEPHONE ENCOUNTER
Pt called back to accept new appt  And reschedule appt with therapy   Pt accepted a virtual visit on 2/26 at 2:30

## 2024-02-23 NOTE — TELEPHONE ENCOUNTER
Attempted to contact pt to reschedule appt from 3/6 to 3/7 per provider.  Unable to contact pt. No vm  Appt moved.

## 2024-02-26 ENCOUNTER — TELEPHONE (OUTPATIENT)
Dept: PSYCHIATRY | Facility: CLINIC | Age: 49
End: 2024-02-26

## 2024-02-28 ENCOUNTER — TELEPHONE (OUTPATIENT)
Dept: OTHER | Age: 49
End: 2024-02-28

## 2024-02-28 NOTE — TELEPHONE ENCOUNTER
Mireya called to schedule an appt since she missed past appts.  Scheduled a virtual appt on 3/11 at 8 am.    For your review.

## 2024-03-04 ENCOUNTER — TELEPHONE (OUTPATIENT)
Dept: PSYCHIATRY | Facility: CLINIC | Age: 49
End: 2024-03-04

## 2024-03-07 ENCOUNTER — OFFICE VISIT (OUTPATIENT)
Dept: OTHER | Age: 49
End: 2024-03-07

## 2024-03-07 VITALS
SYSTOLIC BLOOD PRESSURE: 158 MMHG | HEIGHT: 69 IN | BODY MASS INDEX: 26.9 KG/M2 | DIASTOLIC BLOOD PRESSURE: 80 MMHG | HEART RATE: 78 BPM | WEIGHT: 181.6 LBS

## 2024-03-07 DIAGNOSIS — F11.90 OPIOID USE DISORDER: Primary | ICD-10-CM

## 2024-03-07 DIAGNOSIS — Z63.4 RECENT BEREAVEMENT: ICD-10-CM

## 2024-03-07 RX ORDER — BUPRENORPHINE AND NALOXONE 12; 3 MG/1; MG/1
12 FILM, SOLUBLE BUCCAL; SUBLINGUAL 2 TIMES DAILY
Qty: 60 FILM | Refills: 1 | Status: SHIPPED | OUTPATIENT
Start: 2024-03-07 | End: 2024-05-06

## 2024-03-07 SDOH — SOCIAL STABILITY - SOCIAL INSECURITY: DISSAPEARANCE AND DEATH OF FAMILY MEMBER: Z63.4

## 2024-03-07 NOTE — ASSESSMENT & PLAN NOTE
Patient reports that she is doing better since last visit. She has an upcoming therapy appointment on 3/11/24 for the [facility].   Denies any cravings or re-occurrence of use   Will Continue current suboxone dose   PDMP reviewed  Collect buprenorphine UDS at next visit for routine monitoring   F/U in two months

## 2024-03-07 NOTE — ASSESSMENT & PLAN NOTE
Reports that she is still taking Xanax 0.5mg prn. States that her anxiety and depression have improvement.   Will continue to take this medication and follow with her current Psychiatrist that is prescribing it to her.

## 2024-03-07 NOTE — PROGRESS NOTES
[facility]    Progress Note  Mireya Gandhi 48 y.o. female MRN: [id number]  @ Encounter: [id number]      1. Opioid use disorder  Assessment & Plan:  Patient reports that she is doing better since last visit. She has an upcoming therapy appointment on 3/11/24 for the [facility] program.   Denies any cravings or re-occurrence of use   Will Continue current suboxone dose   PDMP reviewed  Collect buprenorphine UDS at next visit for routine monitoring   F/U in two months     Orders:  -     buprenorphine-naloxone (Suboxone) 12-3 MG; Place 1 Film (12 mg total) under the tongue 2 (two) times a day    2. Recent bereavement  Assessment & Plan:  Reports that she is still taking Xanax 0.5mg prn. States that her anxiety and depression have improvement.   Will continue to take this medication and follow with her current Psychiatrist that is prescribing it to her.             Support Services  Case Management and Certified  are following.   Patient was referred to the [facility] Certified Addiction Counselors: No  The patient is actively engaged with the [facility] Certified Addiction Counselor’s psychotherapy plan: No    buprenorphine-naloxone      PDMP reviewed:     PDMP Review         Value Time User    PDMP Reviewed  Yes 3/7/2024  2:15 PM Brigette Davis PA-C            SUBJECTIVE  Patient seen and examined in office. Denies any re-occurrence of use or cravings on current medication. She is engaged with Psychotherapy through the [facility] program and is also following with outpatient psychiatrist to address her anxiety and depression.     Drug cravings: none   Motivation to continue treatment: high       Current Outpatient Medications:     ALPRAZolam (XANAX) 1 mg tablet, Take 1 mg by mouth 4 (four) times a day as needed for anxiety, Disp: , Rfl:     buprenorphine-naloxone (Suboxone) 12-3 MG, Place 1 Film (12 mg total) under the tongue 2 (two) times a day, Disp: 60 Film, Rfl: 1    methimazole  (TAPAZOLE) 5 mg tablet, Take 2.5 mg by mouth daily, Disp: , Rfl:     Multiple Vitamin (multivitamin) tablet, Take 1 tablet by mouth daily, Disp: , Rfl:     naloxone (NARCAN) 4 mg/0.1 mL nasal spray, Administer 1 spray into a nostril. If no response after 2-3 minutes, give another dose in the other nostril using a new spray., Disp: 1 each, Rfl: 5    omeprazole (PriLOSEC) 40 MG capsule, , Disp: , Rfl:     ziprasidone (GEODON) 80 mg capsule, Take 80 mg by mouth daily at bedtime, Disp: , Rfl:     ziprasidone (GEODON) 80 mg capsule, Take 40 mg by mouth every morning, Disp: , Rfl:     aluminum-magnesium hydroxide-simethicone (MAALOX MAX) 400-400-40 MG/5ML suspension, Take 10 mL by mouth every 6 (six) hours as needed for indigestion or heartburn (nausea), Disp: 355 mL, Rfl: 0    famotidine (PEPCID) 40 MG tablet, Take 1 tablet (40 mg total) by mouth daily at bedtime as needed for heartburn, Disp: 20 tablet, Rfl: 0    guaiFENesin (MUCINEX) 600 mg 12 hr tablet, Take 1 tablet (600 mg total) by mouth every 12 (twelve) hours, Disp: 14 tablet, Rfl: 0    hydrOXYzine HCL (ATARAX) 25 mg tablet, Take 2 tablets (50 mg total) by mouth every 6 (six) hours as needed for anxiety, Disp: 120 tablet, Rfl: 0    hydrOXYzine pamoate (VISTARIL) 50 mg capsule, Take 1 capsule (50 mg total) by mouth 3 (three) times a day, Disp: 90 capsule, Rfl: 1    ibuprofen (MOTRIN) 600 mg tablet, Take 1 tablet (600 mg total) by mouth every 6 (six) hours as needed for mild pain, Disp: 40 tablet, Rfl: 0    magnesium oxide (MAG-OX) 400 mg, Take 2 tablets (800 mg total) by mouth 2 (two) times a day for 7 days, Disp: 28 tablet, Rfl: 0    mirtazapine (REMERON SOL-TAB) 30 mg dispersible tablet, Take 30 mg by mouth (Patient not taking: Reported on 8/18/2022), Disp: , Rfl:     prazosin (MINIPRESS) 2 mg capsule, Take 1 capsule (2 mg total) by mouth daily at bedtime, Disp: 30 capsule, Rfl: 0    Objective     Vitals:    03/07/24 1405   BP: 158/80   Pulse: 78       Physical  Exam  Vitals reviewed.   Constitutional:       General: She is not in acute distress.     Appearance: She is not diaphoretic.              Lab Results:                              Counseling / Coordination of Care  Total time spent today 30 minutes. Greater than 50% of total time was spent with the patient and / or family counseling and / or coordination of care.     Brigette Davis PA-C

## 2024-03-11 ENCOUNTER — TELEMEDICINE (OUTPATIENT)
Dept: PSYCHIATRY | Facility: CLINIC | Age: 49
End: 2024-03-11
Payer: MEDICARE

## 2024-03-11 DIAGNOSIS — F19.90 POLYSUBSTANCE USE DISORDER: ICD-10-CM

## 2024-03-11 DIAGNOSIS — F11.90 OPIOID USE DISORDER: ICD-10-CM

## 2024-03-11 DIAGNOSIS — F43.10 PTSD (POST-TRAUMATIC STRESS DISORDER): ICD-10-CM

## 2024-03-11 DIAGNOSIS — Z63.4 RECENT BEREAVEMENT: Primary | ICD-10-CM

## 2024-03-11 DIAGNOSIS — F39 MOOD DISORDER (HCC): ICD-10-CM

## 2024-03-11 PROCEDURE — 90834 PSYTX W PT 45 MINUTES: CPT | Performed by: COUNSELOR

## 2024-03-11 SDOH — SOCIAL STABILITY - SOCIAL INSECURITY: DISSAPEARANCE AND DEATH OF FAMILY MEMBER: Z63.4

## 2024-03-11 NOTE — PSYCH
Behavioral Health Psychotherapy Progress Note    Psychotherapy Provided: Individual Psychotherapy     1. Recent bereavement        2. PTSD (post-traumatic stress disorder)        3. Opioid use disorder        4. Mood disorder (HCC)        5. Polysubstance use disorder            Goals addressed in session: Goal 1, Goal 2, and Goal 3      DATA: Mireya was seen for a virtual visit. Mireya discussed her current stress level and reports that this has decreased. Mireya mentioned she felt she had seen her  SO as an apparition and therapist had Mireya discuss her beliefs about this situation. Mireya presented with no significant acute psychosis and indicated a spiritual belief regarding spirits. Mireya did indicate calling the police about noise in the home that she then attributed to this spirit but understood how others may not believe this. Mireya stated she believed that his spirit had moved on. Therapist utilized this acceptance to assist Mireya with the processing of grief. Mireya reports ongoing improvement with her  SO's family. Mireya also indicated she had reached out to her son as discussed in the previous session and had a positive interaction. Mireya reports she recognizes she has been making some progress and wants to continue with this progress. Therapist encouraged Mireya to begin increasing her healthy social interactions.  Mireya reports medication compliance and states she has not received a refill for her xanax, indicating she has an overall decrease in anxiety. Mireya was scheduled for a virtual session next week.  During this session, this clinician used the following therapeutic modalities: Bereavement Therapy, Client-centered Therapy, Cognitive Behavioral Therapy, Motivational Interviewing, and Supportive Psychotherapy    Substance Abuse was addressed during this session. If the client is diagnosed with a co-occurring substance use disorder, please  "indicate any changes in the frequency or amount of use: Mireya reports she continues to use crack cocaine 1-2 x weekly. Mireya reports this is a decrease.  Therapist utilized stage appropriate interventions to address this substance abuse issue.  Mireya continues to report she wants to decrease or stop this use.  Therapist discussed with Mireya the use of 12 step meetings or support groups to assist her in stopping her use. Mireya states she continues to struggle with being around others and feels that one on one is more effective for her.  Therapist highlighted for Mireya the importance of finding a support. Mireya agreed to begin attending [facility] to meet with others and find a friend to talk with. Mireya reports she continues to take her suboxone as prescribed. Stage of change for addressing substance use diagnoses: Contemplation    ASSESSMENT:  Mireya Gandhi presents with a Euthymic/ normal mood. Mireya was able to be reflective     her affect is Normal range and intensity, which is congruent, with her mood and the content of the session. The client has made progress on their goals.     Mireya Gandhi presents with a none risk of suicide, none risk of self-harm, and none risk of harm to others.    For any risk assessment that surpasses a \"low\" rating, a safety plan must be developed.    A safety plan was indicated: no  If yes, describe in detail n/a    PLAN: Between sessions, Mireya Gandhi will begin attending [facility] and will go to her PCP office to discuss acquiring a . At the next session, the therapist will use Bereavement Therapy, Client-centered Therapy, Cognitive Behavioral Therapy, Motivational Interviewing, and Supportive Psychotherapy to address Mireya's current contemplative stage of her cocaine use and attempt to move Mireya to preparation stage by continued ongoing commitment to developing a plan to address this use.  Mireya continues to need " assistance in developing healthy stress management techniques.      Behavioral Health Treatment Plan and Discharge Planning: Mireya Gandhi is aware of and agrees to continue to work on their treatment plan. They have identified and are working toward their discharge goals. yes    Visit start and stop times:    03/11/24  Start Time: 0805  Stop Time: 0845  Total Visit Time: 40 minutes  Virtual Regular Visit    Verification of patient location:    Patient is located at Home in the following state in which I hold an active license [place]      Assessment/Plan:    Problem List Items Addressed This Visit       Polysubstance use disorder    Mood disorder (HCC)    Opioid use disorder    PTSD (post-traumatic stress disorder)    Recent bereavement - Primary       Goals addressed in session: Goal 1, Goal 2, and Goal 3           Reason for visit is   Chief Complaint   Patient presents with    Virtual Regular Visit          Encounter provider Taurus Carvajal    Provider located at [facility] [address] [address]  [phone]      Recent Visits  Date Type Provider Dept   03/04/24 Telephone [facility]   Showing recent visits within past 7 days and meeting all other requirements  Today's Visits  Date Type Provider Dept   03/11/24 Telemedicine [facility]al [facility]   Showing today's visits and meeting all other requirements  Future Appointments  No visits were found meeting these conditions.  Showing future appointments within next 150 days and meeting all other requirements       The patient was identified by name and date of birth. Mireya Gandhi was informed that this is a telemedicine visit and that the visit is being conducted throughthe [employer] platform. She agrees to proceed..  Other methods to assure confidentiality were taken door closed, sound machine  utilized. No one else was in the room.  She acknowledged consent and understanding of privacy and security of the video platform. The patient has agreed to participate and understands they can discontinue the visit at any time.    Patient is aware this is a billable service.     Subjective  Mireya Gandhi is a 48 y.o. female who was seen for a telehealth session .      HPI     Past Medical History:   Diagnosis Date    Cardiomyopathy (HCC)     Elevated transaminase measurement     Irritable bowel syndrome (IBS)     with Constipation    Nutritional deficiency     Overactive thyroid gland     Palpitations     Sweating abnormality     Vaginal infection     Vitamin D insufficiency        Past Surgical History:   Procedure Laterality Date    ANKLE SURGERY Left     FINGER SURGERY      TUBAL LIGATION      UPPER GASTROINTESTINAL ENDOSCOPY         Current Outpatient Medications   Medication Sig Dispense Refill    ALPRAZolam (XANAX) 1 mg tablet Take 1 mg by mouth 4 (four) times a day as needed for anxiety      aluminum-magnesium hydroxide-simethicone (MAALOX MAX) 400-400-40 MG/5ML suspension Take 10 mL by mouth every 6 (six) hours as needed for indigestion or heartburn (nausea) 355 mL 0    buprenorphine-naloxone (Suboxone) 12-3 MG Place 1 Film (12 mg total) under the tongue 2 (two) times a day 60 Film 1    famotidine (PEPCID) 40 MG tablet Take 1 tablet (40 mg total) by mouth daily at bedtime as needed for heartburn 20 tablet 0    guaiFENesin (MUCINEX) 600 mg 12 hr tablet Take 1 tablet (600 mg total) by mouth every 12 (twelve) hours 14 tablet 0    hydrOXYzine HCL (ATARAX) 25 mg tablet Take 2 tablets (50 mg total) by mouth every 6 (six) hours as needed for anxiety 120 tablet 0    hydrOXYzine pamoate (VISTARIL) 50 mg capsule Take 1 capsule (50 mg total) by mouth 3 (three) times a day 90 capsule 1    ibuprofen (MOTRIN) 600 mg tablet Take 1 tablet (600 mg total) by mouth every 6 (six) hours as needed for mild pain 40 tablet 0     magnesium oxide (MAG-OX) 400 mg Take 2 tablets (800 mg total) by mouth 2 (two) times a day for 7 days 28 tablet 0    methimazole (TAPAZOLE) 5 mg tablet Take 2.5 mg by mouth daily      mirtazapine (REMERON SOL-TAB) 30 mg dispersible tablet Take 30 mg by mouth (Patient not taking: Reported on 8/18/2022)      Multiple Vitamin (multivitamin) tablet Take 1 tablet by mouth daily      naloxone (NARCAN) 4 mg/0.1 mL nasal spray Administer 1 spray into a nostril. If no response after 2-3 minutes, give another dose in the other nostril using a new spray. 1 each 5    omeprazole (PriLOSEC) 40 MG capsule       prazosin (MINIPRESS) 2 mg capsule Take 1 capsule (2 mg total) by mouth daily at bedtime 30 capsule 0    ziprasidone (GEODON) 80 mg capsule Take 80 mg by mouth daily at bedtime      ziprasidone (GEODON) 80 mg capsule Take 40 mg by mouth every morning       No current facility-administered medications for this visit.        Allergies   Allergen Reactions    Gabapentin Anaphylaxis    Hydrocodone Hives    Adhesive [Medical Tape] Rash       Review of Systems    Video Exam    There were no vitals filed for this visit.    Physical Exam     Visit Time    Visit Start Time: 805  Visit Stop Time: 845   Total Visit Duration:  40 minutes

## 2024-03-18 ENCOUNTER — TELEPHONE (OUTPATIENT)
Dept: BEHAVIORAL/MENTAL HEALTH CLINIC | Facility: CLINIC | Age: 49
End: 2024-03-18

## 2024-03-18 NOTE — TELEPHONE ENCOUNTER
Patient is calling regarding cancelling an appointment.    Date/Time: 3/18 1pm    Reason: pt has a scheduling conflict. Pt stated she has another appt that she has been anticipating for a long time and needs to keep that appt but she will call back to reschedule with Barney.     Patient was rescheduled: YES [] NO [x]

## 2024-04-09 ENCOUNTER — TELEPHONE (OUTPATIENT)
Dept: OTHER | Age: 49
End: 2024-04-09

## 2024-04-09 NOTE — TELEPHONE ENCOUNTER
"Mireya called the office to cancel her appt today with Taurus WRIGHT, therapist, due to family \"showing up from out of town.\"  She states that she will call back to reschedule.    For your review.  "

## 2024-04-15 ENCOUNTER — APPOINTMENT (EMERGENCY)
Dept: CT IMAGING | Facility: HOSPITAL | Age: 49
End: 2024-04-15
Payer: MEDICARE

## 2024-04-15 ENCOUNTER — HOSPITAL ENCOUNTER (EMERGENCY)
Facility: HOSPITAL | Age: 49
Discharge: HOME | End: 2024-04-15
Attending: EMERGENCY MEDICINE
Payer: MEDICARE

## 2024-04-15 VITALS
RESPIRATION RATE: 18 BRPM | SYSTOLIC BLOOD PRESSURE: 152 MMHG | DIASTOLIC BLOOD PRESSURE: 88 MMHG | TEMPERATURE: 98.4 F | OXYGEN SATURATION: 96 % | WEIGHT: 176.5 LBS | HEART RATE: 67 BPM | BODY MASS INDEX: 26.06 KG/M2

## 2024-04-15 DIAGNOSIS — F43.21 GRIEF REACTION: Primary | ICD-10-CM

## 2024-04-15 DIAGNOSIS — R41.3 EPISODIC MEMORY LOSS: ICD-10-CM

## 2024-04-15 LAB
ALBUMIN SERPL BCP-MCNC: 4.1 G/DL (ref 3.5–5)
ALP SERPL-CCNC: 117 U/L (ref 34–104)
ALT SERPL W P-5'-P-CCNC: 13 U/L (ref 7–52)
ANION GAP SERPL CALCULATED.3IONS-SCNC: 6 MMOL/L (ref 4–13)
AST SERPL W P-5'-P-CCNC: 18 U/L (ref 13–39)
ATRIAL RATE: 58 BPM
BASOPHILS # BLD AUTO: 0.07 THOUSANDS/ÂΜL (ref 0–0.1)
BASOPHILS NFR BLD AUTO: 1 % (ref 0–1)
BILIRUB SERPL-MCNC: 0.32 MG/DL (ref 0.2–1)
BILIRUB UR QL STRIP: NEGATIVE
BUN SERPL-MCNC: 15 MG/DL (ref 5–25)
CALCIUM SERPL-MCNC: 9 MG/DL (ref 8.4–10.2)
CHLORIDE SERPL-SCNC: 101 MMOL/L (ref 96–108)
CLARITY UR: CLEAR
CO2 SERPL-SCNC: 27 MMOL/L (ref 21–32)
COLOR UR: YELLOW
CREAT SERPL-MCNC: 0.86 MG/DL (ref 0.6–1.3)
EOSINOPHIL # BLD AUTO: 0.27 THOUSAND/ÂΜL (ref 0–0.61)
EOSINOPHIL NFR BLD AUTO: 3 % (ref 0–6)
ERYTHROCYTE [DISTWIDTH] IN BLOOD BY AUTOMATED COUNT: 12.6 % (ref 11.6–15.1)
GFR SERPL CREATININE-BSD FRML MDRD: 81 ML/MIN/1.73SQ M
GLUCOSE SERPL-MCNC: 111 MG/DL (ref 65–140)
GLUCOSE UR STRIP-MCNC: NEGATIVE MG/DL
HCT VFR BLD AUTO: 39.1 % (ref 34.8–46.1)
HGB BLD-MCNC: 12.7 G/DL (ref 11.5–15.4)
HGB UR QL STRIP.AUTO: NEGATIVE
IMM GRANULOCYTES # BLD AUTO: 0.02 THOUSAND/UL (ref 0–0.2)
IMM GRANULOCYTES NFR BLD AUTO: 0 % (ref 0–2)
KETONES UR STRIP-MCNC: NEGATIVE MG/DL
LEUKOCYTE ESTERASE UR QL STRIP: NEGATIVE
LYMPHOCYTES # BLD AUTO: 2.07 THOUSANDS/ÂΜL (ref 0.6–4.47)
LYMPHOCYTES NFR BLD AUTO: 25 % (ref 14–44)
MCH RBC QN AUTO: 28.5 PG (ref 26.8–34.3)
MCHC RBC AUTO-ENTMCNC: 32.5 G/DL (ref 31.4–37.4)
MCV RBC AUTO: 88 FL (ref 82–98)
MONOCYTES # BLD AUTO: 0.5 THOUSAND/ÂΜL (ref 0.17–1.22)
MONOCYTES NFR BLD AUTO: 6 % (ref 4–12)
NEUTROPHILS # BLD AUTO: 5.51 THOUSANDS/ÂΜL (ref 1.85–7.62)
NEUTS SEG NFR BLD AUTO: 65 % (ref 43–75)
NITRITE UR QL STRIP: NEGATIVE
NRBC BLD AUTO-RTO: 0 /100 WBCS
P AXIS: -9 DEGREES
PH UR STRIP.AUTO: 7 [PH]
PLATELET # BLD AUTO: 280 THOUSANDS/UL (ref 149–390)
PMV BLD AUTO: 8.8 FL (ref 8.9–12.7)
POTASSIUM SERPL-SCNC: 4.9 MMOL/L (ref 3.5–5.3)
PR INTERVAL: 134 MS
PROT SERPL-MCNC: 6.8 G/DL (ref 6.4–8.4)
PROT UR STRIP-MCNC: NEGATIVE MG/DL
QRS AXIS: 34 DEGREES
QRSD INTERVAL: 92 MS
QT INTERVAL: 426 MS
QTC INTERVAL: 418 MS
RBC # BLD AUTO: 4.45 MILLION/UL (ref 3.81–5.12)
SODIUM SERPL-SCNC: 134 MMOL/L (ref 135–147)
SP GR UR STRIP.AUTO: 1.01 (ref 1–1.04)
T WAVE AXIS: 59 DEGREES
UROBILINOGEN UA: NEGATIVE MG/DL
VENTRICULAR RATE: 58 BPM
WBC # BLD AUTO: 8.44 THOUSAND/UL (ref 4.31–10.16)

## 2024-04-15 PROCEDURE — 99285 EMERGENCY DEPT VISIT HI MDM: CPT

## 2024-04-15 PROCEDURE — 93010 ELECTROCARDIOGRAM REPORT: CPT

## 2024-04-15 PROCEDURE — 96360 HYDRATION IV INFUSION INIT: CPT

## 2024-04-15 PROCEDURE — 85025 COMPLETE CBC W/AUTO DIFF WBC: CPT | Performed by: EMERGENCY MEDICINE

## 2024-04-15 PROCEDURE — 99284 EMERGENCY DEPT VISIT MOD MDM: CPT | Performed by: EMERGENCY MEDICINE

## 2024-04-15 PROCEDURE — 96361 HYDRATE IV INFUSION ADD-ON: CPT

## 2024-04-15 PROCEDURE — 70450 CT HEAD/BRAIN W/O DYE: CPT

## 2024-04-15 PROCEDURE — 93005 ELECTROCARDIOGRAM TRACING: CPT

## 2024-04-15 PROCEDURE — 80053 COMPREHEN METABOLIC PANEL: CPT | Performed by: EMERGENCY MEDICINE

## 2024-04-15 PROCEDURE — 36415 COLL VENOUS BLD VENIPUNCTURE: CPT | Performed by: EMERGENCY MEDICINE

## 2024-04-15 RX ADMIN — SODIUM CHLORIDE 1000 ML: 0.9 INJECTION, SOLUTION INTRAVENOUS at 05:24

## 2024-04-15 NOTE — ED PROVIDER NOTES
History  Chief Complaint   Patient presents with   • Seizure - Prior Hx Of     Pt states in the past month she has had 3x seizures, most recent 2 weeks ago, and saw her PCP Friday. Pt states he suggested visiting the ER for memory loss. Pt states she has been more forgetful beginning about two weeks ago. Pt denies ay head strikes, usually in bed. Pt reports she does not take seizure medications. Pt states she took her morning meds (bentyl, protonix, pepcid and buspar) this am.      48-year-old female presents with complaint of possible seizures along with memory loss.  She reports that her  passed away in January and her symptoms began soon after.  She reports having episodes where she is lying in bed and feels as though she is in a fog.  She then feels as though she is unable to move.  These episodes last for a few seconds and then resolve with no postictal phase.  She reports that she had several episodes back to back initially and has had the same event occur handful of times since then with most recent episode occurring 2 weeks ago.  She denies any other symptoms in association with this.  She does admit that she is under increased stress since losing her  and has had difficulty with sleeping.  She saw her primary care provider who ordered an EEG along with other lab studies.  These have not yet been performed.  She denies any tonic-clonic activity, loss of bowel or bladder function, or other issues related to these episodes.  As far as the memory loss she reports that she walked into her room and forget why she was going in there.  She denies any head injury, fevers, focal logical deficits, or other acute issues at this time.        Prior to Admission Medications   Prescriptions Last Dose Informant Patient Reported? Taking?   ALPRAZolam (XANAX) 1 mg tablet   Yes No   Sig: Take 1 mg by mouth 4 (four) times a day as needed for anxiety   Multiple Vitamin (multivitamin) tablet   Yes No   Sig: Take 1  tablet by mouth daily   aluminum-magnesium hydroxide-simethicone (MAALOX MAX) 400-400-40 MG/5ML suspension   No No   Sig: Take 10 mL by mouth every 6 (six) hours as needed for indigestion or heartburn (nausea)   buprenorphine-naloxone (Suboxone) 12-3 MG   No No   Sig: Place 1 Film (12 mg total) under the tongue 2 (two) times a day   famotidine (PEPCID) 40 MG tablet   No No   Sig: Take 1 tablet (40 mg total) by mouth daily at bedtime as needed for heartburn   guaiFENesin (MUCINEX) 600 mg 12 hr tablet   No No   Sig: Take 1 tablet (600 mg total) by mouth every 12 (twelve) hours   hydrOXYzine HCL (ATARAX) 25 mg tablet   No No   Sig: Take 2 tablets (50 mg total) by mouth every 6 (six) hours as needed for anxiety   hydrOXYzine pamoate (VISTARIL) 50 mg capsule   No No   Sig: Take 1 capsule (50 mg total) by mouth 3 (three) times a day   ibuprofen (MOTRIN) 600 mg tablet   No No   Sig: Take 1 tablet (600 mg total) by mouth every 6 (six) hours as needed for mild pain   magnesium oxide (MAG-OX) 400 mg   No No   Sig: Take 2 tablets (800 mg total) by mouth 2 (two) times a day for 7 days   methimazole (TAPAZOLE) 5 mg tablet   Yes No   Sig: Take 2.5 mg by mouth daily   mirtazapine (REMERON SOL-TAB) 30 mg dispersible tablet   Yes No   Sig: Take 30 mg by mouth   Patient not taking: Reported on 8/18/2022   naloxone (NARCAN) 4 mg/0.1 mL nasal spray   No No   Sig: Administer 1 spray into a nostril. If no response after 2-3 minutes, give another dose in the other nostril using a new spray.   omeprazole (PriLOSEC) 40 MG capsule   Yes No   prazosin (MINIPRESS) 2 mg capsule   No No   Sig: Take 1 capsule (2 mg total) by mouth daily at bedtime   ziprasidone (GEODON) 80 mg capsule   Yes No   Sig: Take 80 mg by mouth daily at bedtime   ziprasidone (GEODON) 80 mg capsule   Yes No   Sig: Take 40 mg by mouth every morning      Facility-Administered Medications: None       Past Medical History:   Diagnosis Date   • Cardiomyopathy (HCC)    •  Elevated transaminase measurement    • Irritable bowel syndrome (IBS)     with Constipation   • Nutritional deficiency    • Overactive thyroid gland    • Palpitations    • Sweating abnormality    • Vaginal infection    • Vitamin D insufficiency        Past Surgical History:   Procedure Laterality Date   • ANKLE SURGERY Left    • FINGER SURGERY     • TUBAL LIGATION     • UPPER GASTROINTESTINAL ENDOSCOPY         Family History   Problem Relation Age of Onset   • No Known Problems Mother    • Diabetes Father    • Cancer Father      I have reviewed and agree with the history as documented.    E-Cigarette/Vaping   • E-Cigarette Use Never User      E-Cigarette/Vaping Substances     Social History     Tobacco Use   • Smoking status: Every Day     Current packs/day: 0.50     Average packs/day: 0.5 packs/day for 30.0 years (15.0 ttl pk-yrs)     Types: Cigarettes   • Smokeless tobacco: Never   Vaping Use   • Vaping status: Never Used   Substance Use Topics   • Alcohol use: Not Currently     Comment: occasionally   • Drug use: Yes     Types: Cocaine, Marijuana     Comment: Last use today       Review of Systems   Constitutional:  Negative for chills and fever.   Respiratory:  Negative for shortness of breath.    Cardiovascular:  Negative for chest pain.   Neurological:  Negative for tremors, seizures, facial asymmetry, speech difficulty and weakness.   Psychiatric/Behavioral:  Positive for sleep disturbance. Negative for suicidal ideas.    All other systems reviewed and are negative.      Physical Exam  Physical Exam  Vitals and nursing note reviewed.   Constitutional:       General: She is not in acute distress.     Appearance: Normal appearance. She is well-developed. She is not ill-appearing or toxic-appearing.   HENT:      Head: Normocephalic and atraumatic.      Right Ear: External ear normal.      Left Ear: External ear normal.      Nose: Nose normal. No congestion.      Mouth/Throat:      Mouth: Mucous membranes are  moist.      Pharynx: Oropharynx is clear.   Eyes:      Conjunctiva/sclera: Conjunctivae normal.      Pupils: Pupils are equal, round, and reactive to light.   Cardiovascular:      Rate and Rhythm: Normal rate and regular rhythm.      Heart sounds: Normal heart sounds.   Pulmonary:      Effort: Pulmonary effort is normal. No respiratory distress.      Breath sounds: Normal breath sounds. No wheezing.   Abdominal:      General: Bowel sounds are normal.      Palpations: Abdomen is soft.      Tenderness: There is no abdominal tenderness. There is no guarding.   Musculoskeletal:         General: No tenderness or deformity.      Cervical back: Normal range of motion and neck supple. No rigidity.   Skin:     General: Skin is warm and dry.      Capillary Refill: Capillary refill takes less than 2 seconds.      Findings: No rash.   Neurological:      General: No focal deficit present.      Mental Status: She is alert and oriented to person, place, and time.   Psychiatric:         Mood and Affect: Mood normal. Affect is tearful.         Speech: Speech normal.         Behavior: Behavior normal. Behavior is cooperative.         Thought Content: Thought content normal.       Vital Signs  ED Triage Vitals [04/15/24 0452]   Temperature Pulse Respirations Blood Pressure SpO2   98.4 °F (36.9 °C) 67 18 152/88 96 %      Temp Source Heart Rate Source Patient Position - Orthostatic VS BP Location FiO2 (%)   Oral Monitor Sitting Left arm --      Pain Score       --           Vitals:    04/15/24 0452   BP: 152/88   Pulse: 67   Patient Position - Orthostatic VS: Sitting         Visual Acuity      ED Medications  Medications   sodium chloride 0.9 % bolus 1,000 mL (1,000 mL Intravenous New Bag 4/15/24 0524)       Diagnostic Studies  Results Reviewed       Procedure Component Value Units Date/Time    CBC and differential [[id number]]  (Abnormal) Collected: 04/15/24 2322    Lab Status: Final result Specimen: Blood from Arm, Left Updated:  04/15/24 0544     WBC 8.44 Thousand/uL      RBC 4.45 Million/uL      Hemoglobin 12.7 g/dL      Hematocrit 39.1 %      MCV 88 fL      MCH 28.5 pg      MCHC 32.5 g/dL      RDW 12.6 %      MPV 8.8 fL      Platelets 280 Thousands/uL      nRBC 0 /100 WBCs      Segmented % 65 %      Immature Grans % 0 %      Lymphocytes % 25 %      Monocytes % 6 %      Eosinophils Relative 3 %      Basophils Relative 1 %      Absolute Neutrophils 5.51 Thousands/µL      Absolute Immature Grans 0.02 Thousand/uL      Absolute Lymphocytes 2.07 Thousands/µL      Absolute Monocytes 0.50 Thousand/µL      Eosinophils Absolute 0.27 Thousand/µL      Basophils Absolute 0.07 Thousands/µL     Comprehensive metabolic panel [[id number]]  (Abnormal) Collected: 04/15/24 0515    Lab Status: Final result Specimen: Blood from Arm, Left Updated: 04/15/24 0543     Sodium 134 mmol/L      Potassium 4.9 mmol/L      Chloride 101 mmol/L      CO2 27 mmol/L      ANION GAP 6 mmol/L      BUN 15 mg/dL      Creatinine 0.86 mg/dL      Glucose 111 mg/dL      Calcium 9.0 mg/dL      AST 18 U/L      ALT 13 U/L      Alkaline Phosphatase 117 U/L      Total Protein 6.8 g/dL      Albumin 4.1 g/dL      Total Bilirubin 0.32 mg/dL      eGFR 81 ml/min/1.73sq m     Narrative:      National Kid[employer] guidelines for Chronic Kidney Disease (CKD):   •  Stage 1 with normal or high GFR (GFR > 90 mL/min/1.73 square meters)  •  Stage 2 Mild CKD (GFR = 60-89 mL/min/1.73 square meters)  •  Stage 3A Moderate CKD (GFR = 45-59 mL/min/1.73 square meters)  •  Stage 3B Moderate CKD (GFR = 30-44 mL/min/1.73 square meters)  •  Stage 4 Severe CKD (GFR = 15-29 mL/min/1.73 square meters)  •  Stage 5 End Stage CKD (GFR <15 mL/min/1.73 square meters)  Note: GFR calculation is accurate only with a steady state creatinine    UA (URINE) with reflex to Scope [[id number]]     Lab Status: No result Specimen: Urine                    CT head without contrast    (Results Pending)               Procedures  ECG 12 Lead Documentation Only    Date/Time: 4/15/2024 5:09 AM    Performed by: Sonido Angela DO  Authorized by: Sonido Angela DO    ECG reviewed by me, the ED Provider: yes    Patient location:  ED  Rate:     ECG rate:  58    ECG rate assessment: bradycardic    Rhythm:     Rhythm: sinus bradycardia    Ectopy:     Ectopy: none    QRS:     QRS axis:  Normal  Conduction:     Conduction: normal    ST segments:     ST segments:  Normal  T waves:     T waves: normal             ED Course                               SBIRT 22yo+      Flowsheet Row Most Recent Value   Initial Alcohol Screen: US AUDIT-C     1. How often do you have a drink containing alcohol? 1 Filed at: 04/15/2024 0447   2. How many drinks containing alcohol do you have on a typical day you are drinking?  1 Filed at: 04/15/2024 0447   Audit-C Score 2 Filed at: 04/15/2024 0447   PRABHJOT: How many times in the past year have you...    Used an illegal drug or used a prescription medication for non-medical reasons? Never  [not currently] Filed at: 04/15/2024 0447                      Medical Decision Making  48-year-old female presents with concern for possible seizures along with memory loss.  And discussing further with the patient exactly the symptoms she is describing, it does not sound like she is experiencing seizure-like activity.  She has been having issues with difficulty sleeping and has been under a great deal of stress after losing her significant other in January.  She had been taking benzos initially after losing her significant other but reports that this was stopped after approximately 1 month.  She also has a history of drug abuse but has not been using anything recently.  She has been taking her medications as prescribed.  Suspect stress response/grief reaction as the underlying cause of her symptoms.  Will check basic labs and head CT.  She is already scheduled to have an EEG along with other studies performed.  Patient  is aware of the need for close PCP follow-up.    Amount and/or Complexity of Data Reviewed  Labs: ordered.  Radiology: ordered.             Disposition  Final diagnoses:   Grief reaction   Episodic memory loss     Time reflects when diagnosis was documented in both MDM as applicable and the Disposition within this note       Time User Action Codes Description Comment    4/15/2024  6:40 AM Sonido Angela [F43.21] Grief reaction     4/15/2024  6:40 AM Sonido Angela [R41.3] Episodic memory loss           ED Disposition       None          Follow-up Information    None         Patient's Medications   Discharge Prescriptions    No medications on file       No discharge procedures on file.    PDMP Review         Value Time User    PDMP Reviewed  Yes 3/7/2024  2:15 PM Brigette Davis PA-C            ED Provider  Electronically Signed by             Sonido Angela DO  04/15/24 4519

## 2024-04-15 NOTE — DISCHARGE INSTRUCTIONS
Be sure to have you EEG and other studies performed.  Follow up closely with your primary care provider.  Keep a diary of your symptoms, sleep habits, etc as discussed.

## 2024-04-15 NOTE — ED CARE HANDOFF
Emergency Department Sign Out Note        Sign out and transfer of care from Dr Bustos. See Separate Emergency Department note.     The patient, Mireya Gandhi, was evaluated by the previous provider for possible seizures.    Workup Completed:  As above    ED Course / Workup Pending (followup):  Await ct brain                                     Procedures  Medical Decision Making  Amount and/or Complexity of Data Reviewed  Labs: ordered.  Radiology: ordered.            Disposition  Final diagnoses:   Grief reaction   Episodic memory loss     Time reflects when diagnosis was documented in both MDM as applicable and the Disposition within this note       Time User Action Codes Description Comment    4/15/2024  6:40 AM Sonido Angela Add [F43.21] Grief reaction     4/15/2024  6:40 AM Sonido Angela Add [R41.3] Episodic memory loss           ED Disposition       None          Follow-up Information    None       Patient's Medications   Discharge Prescriptions    No medications on file     No discharge procedures on file.       ED Provider  Electronically Signed by     Jameson Hawk MD  04/15/24 0657

## 2024-04-25 ENCOUNTER — TELEPHONE (OUTPATIENT)
Dept: PSYCHIATRY | Facility: CLINIC | Age: 49
End: 2024-04-25

## 2024-04-25 NOTE — TELEPHONE ENCOUNTER
Called pt on 4/7 to schedule f/u visit with provider   No answer  LVM providing office phone number.     Brigette bowles f/u around 5/7

## 2024-04-30 ENCOUNTER — TELEPHONE (OUTPATIENT)
Dept: OTHER | Age: 49
End: 2024-04-30

## 2024-04-30 NOTE — TELEPHONE ENCOUNTER
Mireya called to cancel/reschedule her appt today with sadi Tillman, due to having a  present at her house.  Mireay rescheduled for 5/7/24.    For your review.

## 2024-05-01 DIAGNOSIS — F11.90 OPIOID USE DISORDER: ICD-10-CM

## 2024-05-01 NOTE — TELEPHONE ENCOUNTER
Mireya called the MAT office stating that Brigette BACON, provider, was to put 2 refills on her Suboxone prescription but put only 1 refill. Last picked up around 3/7/24, and she states she has just 2 days left of this medication.  Mireya is scheduled to see Brigette bland on 5/16/24.    Brigette, please advise.

## 2024-05-02 RX ORDER — BUPRENORPHINE AND NALOXONE 12; 3 MG/1; MG/1
12 FILM, SOLUBLE BUCCAL; SUBLINGUAL 2 TIMES DAILY
Qty: 28 FILM | Refills: 0 | Status: SHIPPED | OUTPATIENT
Start: 2024-05-02 | End: 2024-05-16

## 2024-05-02 NOTE — TELEPHONE ENCOUNTER
Called to leave a VM for Mireya regarding her refill request but there was no answer and the VM is full.

## 2024-05-07 ENCOUNTER — TELEPHONE (OUTPATIENT)
Dept: OTHER | Age: 49
End: 2024-05-07

## 2024-05-07 ENCOUNTER — TELEMEDICINE (OUTPATIENT)
Dept: PSYCHIATRY | Facility: CLINIC | Age: 49
End: 2024-05-07
Payer: MEDICARE

## 2024-05-07 DIAGNOSIS — F43.10 PTSD (POST-TRAUMATIC STRESS DISORDER): ICD-10-CM

## 2024-05-07 DIAGNOSIS — F39 MOOD DISORDER (HCC): Primary | ICD-10-CM

## 2024-05-07 DIAGNOSIS — F11.90 OPIOID USE DISORDER: ICD-10-CM

## 2024-05-07 PROCEDURE — 90834 PSYTX W PT 45 MINUTES: CPT | Performed by: COUNSELOR

## 2024-05-07 NOTE — TELEPHONE ENCOUNTER
Mireya left a VM on the MAT line after hours asking when is her appt? Called this morning to [phone] (phone number she provided) and left a VM informing her that her appt is today at 3 pm.    For your review.

## 2024-05-07 NOTE — PSYCH
Behavioral Health Psychotherapy Progress Note    Psychotherapy Provided: Individual Psychotherapy     1. Mood disorder (HCC)        2. Opioid use disorder        3. PTSD (post-traumatic stress disorder)            Goals addressed in session: Goal 1, Goal 2, and Goal 3      DATA: Mireya was seen for a telehealth session. Mireya reports an increase of anxiety and depression and states she is unable to complete goals and tasks. Mireya described these difficulties as staying in the home and isolating. Therapist discussed with Mireya breaking the cycle of this depression by committing to leaving the home for a set period of time daily for at least one week. Mireya agreed this would be helpful, especially since she has some many tasks she hasn't completed that need completing. Mireya was sitting in the dark on her computer and therapist highlighted for Mireya turing on lights or opening shades to promote a better mood.  Mireya reports he anxiety is due to her upcoming need to find alternate housing and her not having a plan. Mireya and therapist brainstormed about possible solutions. Mireya was agreeable to making an appointment with  at this office to complete her rent rebate form and discuss housing options. Mireya reported improved mood though out session and reported at the end of session she felt her mood and outlook had improved. Therapist highlighted this as an example of how engaging in activity with others can improve her mood. Mireya was scheduled for a session in two weeks.   During this session, this clinician used the following therapeutic modalities: Client-centered Therapy, Cognitive Behavioral Therapy, Motivational Interviewing, and Supportive Psychotherapy    Substance Abuse was addressed during this session. If the client is diagnosed with a co-occurring substance use disorder, please indicate any changes in the frequency or amount of use: Mireya continues to report  "occasional crack cocaine use. Therapist utilized stage appropriate interventions to address this substance abuse issue.   Mireya was not receptive to discussion regarding her crack cocaine use, stating she felt she was using significantly less than before and this was not a problem. Therapist highlighted for Mireya how her mental health symptom presentation could be related to her use of crack cocaine. Mireya denied this, stating her symptoms last longer than her crack cocaine use and was no longer interested in feedback.  Stage of change for addressing substance use diagnoses: Pre-contemplation    ASSESSMENT:  Mireya Gandhi presents with a Anxious and Depressed mood.     her affect is Normal range and intensity, which is congruent, with her mood and the content of the session. The client has made progress on their goals.     Mireya Gandhi presents with a none risk of suicide, none risk of self-harm, and none risk of harm to others.    For any risk assessment that surpasses a \"low\" rating, a safety plan must be developed.    A safety plan was indicated: no  If yes, describe in detail n/a    PLAN: Between sessions, Mireya Gandhi will leave the house daily for the next 7 days. At the next session, the therapist will use Client-centered Therapy, Cognitive Behavioral Therapy, Motivational Interviewing, and Supportive Psychotherapy to address Mireya's need to find ways to address her depression and anxiety and get an understanding of how her substance use effects her mental health.    Behavioral Health Treatment Plan and Discharge Planning: Mireya Gandhi is aware of and agrees to continue to work on their treatment plan. They have identified and are working toward their discharge goals. yes    Visit start and stop times:    05/07/24  Start Time: 1458  Stop Time: 1540  Total Visit Time: 42 minutes  Virtual Regular Visit    Verification of patient location:    Patient is located at Home in " the following state in which I hold an active license [place]      Assessment/Plan:    Problem List Items Addressed This Visit       Mood disorder (HCC) - Primary    Opioid use disorder    PTSD (post-traumatic stress disorder)       Goals addressed in session: Goal 1, Goal 2, and Goal 3           Reason for visit is   Chief Complaint   Patient presents with    Virtual Regular Visit          Encounter provider Taurus Carvajal      Recent Visits  No visits were found meeting these conditions.  Showing recent visits within past 7 days and meeting all other requirements  Today's Visits  Date Type Provider Dept   05/07/24 Telemedicine Taurus Carvajal [facility]   Showing today's visits and meeting all other requirements  Future Appointments  No visits were found meeting these conditions.  Showing future appointments within next 150 days and meeting all other requirements       The patient was identified by name and date of birth. Mireya Gandhi was informed that this is a telemedicine visit and that the visit is being conducted throughthe [employer] platform. She agrees to proceed..  My office door was closed. No one else was in the room.  She acknowledged consent and understanding of privacy and security of the video platform. The patient has agreed to participate and understands they can discontinue the visit at any time.    Patient is aware this is a billable service.     Subjective  Mireya Gandhi is a 49 y.o. female who was seen for a telehealth session .      HPI     Past Medical History:   Diagnosis Date    Cardiomyopathy (HCC)     Elevated transaminase measurement     Irritable bowel syndrome (IBS)     with Constipation    Nutritional deficiency     Overactive thyroid gland     Palpitations     Sweating abnormality     Vaginal infection     Vitamin D insufficiency        Past Surgical History:   Procedure Laterality Date    ANKLE SURGERY Left     FINGER SURGERY      TUBAL LIGATION      UPPER  GASTROINTESTINAL ENDOSCOPY         Current Outpatient Medications   Medication Sig Dispense Refill    ALPRAZolam (XANAX) 1 mg tablet Take 1 mg by mouth 4 (four) times a day as needed for anxiety      aluminum-magnesium hydroxide-simethicone (MAALOX MAX) 400-400-40 MG/5ML suspension Take 10 mL by mouth every 6 (six) hours as needed for indigestion or heartburn (nausea) 355 mL 0    buprenorphine-naloxone (Suboxone) 12-3 MG Place 1 Film (12 mg total) under the tongue 2 (two) times a day for 14 days 28 Film 0    famotidine (PEPCID) 40 MG tablet Take 1 tablet (40 mg total) by mouth daily at bedtime as needed for heartburn 20 tablet 0    guaiFENesin (MUCINEX) 600 mg 12 hr tablet Take 1 tablet (600 mg total) by mouth every 12 (twelve) hours 14 tablet 0    hydrOXYzine HCL (ATARAX) 25 mg tablet Take 2 tablets (50 mg total) by mouth every 6 (six) hours as needed for anxiety 120 tablet 0    hydrOXYzine pamoate (VISTARIL) 50 mg capsule Take 1 capsule (50 mg total) by mouth 3 (three) times a day 90 capsule 1    ibuprofen (MOTRIN) 600 mg tablet Take 1 tablet (600 mg total) by mouth every 6 (six) hours as needed for mild pain 40 tablet 0    magnesium oxide (MAG-OX) 400 mg Take 2 tablets (800 mg total) by mouth 2 (two) times a day for 7 days 28 tablet 0    methimazole (TAPAZOLE) 5 mg tablet Take 2.5 mg by mouth daily      mirtazapine (REMERON SOL-TAB) 30 mg dispersible tablet Take 30 mg by mouth (Patient not taking: Reported on 8/18/2022)      Multiple Vitamin (multivitamin) tablet Take 1 tablet by mouth daily      naloxone (NARCAN) 4 mg/0.1 mL nasal spray Administer 1 spray into a nostril. If no response after 2-3 minutes, give another dose in the other nostril using a new spray. 1 each 5    omeprazole (PriLOSEC) 40 MG capsule       prazosin (MINIPRESS) 2 mg capsule Take 1 capsule (2 mg total) by mouth daily at bedtime 30 capsule 0    ziprasidone (GEODON) 80 mg capsule Take 80 mg by mouth daily at bedtime      ziprasidone (GEODON)  80 mg capsule Take 40 mg by mouth every morning       No current facility-administered medications for this visit.        Allergies   Allergen Reactions    Gabapentin Anaphylaxis    Hydrocodone Hives    Adhesive [Medical Tape] Rash       Review of Systems    Video Exam    There were no vitals filed for this visit.    Physical Exam     Visit Time    Visit Start Time: 1458  Visit Stop Time: 1540  Total Visit Duration:  42 minutes

## 2024-05-08 ENCOUNTER — TELEPHONE (OUTPATIENT)
Dept: PSYCHIATRY | Facility: CLINIC | Age: 49
End: 2024-05-08

## 2024-05-08 NOTE — TELEPHONE ENCOUNTER
Mireya called the MAT office to confirm her appt with Leti MCKENZIE CM, tomorrow at 3 pm and to confirm change in her phone number.  New phone number entered into [employer].

## 2024-05-09 ENCOUNTER — OFFICE VISIT (OUTPATIENT)
Dept: PSYCHIATRY | Facility: CLINIC | Age: 49
End: 2024-05-09

## 2024-05-09 DIAGNOSIS — F19.90 POLYSUBSTANCE USE DISORDER: Primary | ICD-10-CM

## 2024-05-10 NOTE — PSYCH
Mireya Gandhi  05/10/24  Start Time: 1505  Stop Time: 1600  Total Visit Time: 55 minutes    Visit type: In person    Recovery needs addressed during session: Basic Needs    Notes (DAP Format)  DATA: Patient presented for in-person, scheduled visit with this CM.     Patient was scheduled by Psychotherapist to have this CM assist in completing rent rebate application.   Patient and this CM completed necessary application, while ensuring a copy of annual income was enclosed in application that was sent via mail.     ASSESSMENT: Patient presented well groomed, anxious. Patient was unable to sit throughout session as patient forgot pieces of information needed to complete application, but were able to be obtained during session my making phone calls to personal bank, home, etc.   Patient was alert and able to independently answer necessary questions throughout today's session.     PLAN: Patient does not plan to meet with this CM unless necessary.   Patient does have upcoming appointments with both Psychotherapist and Medical Toxicology.     Referrals made during this visit: No formal referrals made, rent rebate application mailed accordingly     Recovery connections: [facility], [facility]     Next appointment: No future appointment scheduled at this time

## 2024-05-13 ENCOUNTER — TELEPHONE (OUTPATIENT)
Dept: PSYCHIATRY | Facility: CLINIC | Age: 49
End: 2024-05-13

## 2024-05-13 NOTE — TELEPHONE ENCOUNTER
Patient left  a message requesting a call back, writer contacted patient and confirm follow up appointments.

## 2024-05-16 DIAGNOSIS — F11.90 OPIOID USE DISORDER: ICD-10-CM

## 2024-05-16 RX ORDER — BUPRENORPHINE AND NALOXONE 12; 3 MG/1; MG/1
12 FILM, SOLUBLE BUCCAL; SUBLINGUAL 2 TIMES DAILY
Qty: 60 FILM | Refills: 0 | Status: SHIPPED | OUTPATIENT
Start: 2024-05-16 | End: 2024-06-15

## 2024-05-16 NOTE — TELEPHONE ENCOUNTER
Medication Refill Request for:suboxone     When was the patient last seen by MAT provider? 3/7    Have they had any cancellations or no-shows since the last visit? Yes [x] No []  5/16 pt sick   When is the next appointment scheduled? 6/13 2:30    Verified pharmacy   [x]    Verified ordering Provider   [x]    Does patient have enough for the next 3 days? Yes [] No [x]

## 2024-05-20 ENCOUNTER — TELEPHONE (OUTPATIENT)
Dept: PSYCHIATRY | Facility: CLINIC | Age: 49
End: 2024-05-20

## 2024-05-21 ENCOUNTER — TELEPHONE (OUTPATIENT)
Dept: OTHER | Age: 49
End: 2024-05-21

## 2024-05-21 NOTE — TELEPHONE ENCOUNTER
Mireya called the [facility] office requesting a call back.  Called back Mireya and left a  returning her call. [facility] office number provided.

## 2024-05-21 NOTE — TELEPHONE ENCOUNTER
Mireya Gandhi called the office and left a voicemail and  requested a call back. .    They can be reached at P# [phone].       Thank you.

## 2024-05-23 ENCOUNTER — SOCIAL WORK (OUTPATIENT)
Dept: PSYCHIATRY | Facility: CLINIC | Age: 49
End: 2024-05-23

## 2024-05-23 DIAGNOSIS — F43.10 PTSD (POST-TRAUMATIC STRESS DISORDER): ICD-10-CM

## 2024-05-23 DIAGNOSIS — F11.90 OPIOID USE DISORDER: Primary | ICD-10-CM

## 2024-05-23 DIAGNOSIS — F39 MOOD DISORDER (HCC): ICD-10-CM

## 2024-05-23 DIAGNOSIS — R46.89 RISK TAKING BEHAVIOR: ICD-10-CM

## 2024-05-23 NOTE — PSYCH
Behavioral Health Psychotherapy Progress Note    Psychotherapy Provided: Individual Psychotherapy     1. Opioid use disorder        2. Mood disorder (HCC)        3. PTSD (post-traumatic stress disorder)        4. Risk taking behavior            Goals addressed in session: Goal 1, Goal 2, and Goal 3      DATA: Mireya arrived slightly late for her appointment. Mireya reports increased anxiety and distress due to having physical reactions to her anxiety. Mireya and therapist discussed this reaction and how Mireya's stressors have been affecting her. Mireya reports increased distress due to poor interactions in her neighborhood in her social [place]. Mireya and therapist discussed ways to minimize the harm these interactions cause and Mireya and therapist were able to identify Mireya moving out of the neighborhood as a way to address this issue. Mireya was offered the suggestion of going to a local mall to walk and window shop as a way to leave her home in an environment she feels safe at and she enjoys. Mireya reports sporadic medication adherence and states she recognizes not taking her medications may be increasing her distress.Mireya and therapist reviewed Mireya's treatment plan and it was agreed Mireya would maintain her current treatment plan due to lack of progress on her goals.  Mireya was scheduled for a session in 2 weeks.  During this session, this clinician used the following therapeutic modalities: Client-centered Therapy, Cognitive Behavioral Therapy, Motivational Interviewing, and Supportive Psychotherapy    Substance Abuse was addressed during this session. If the client is diagnosed with a co-occurring substance use disorder, please indicate any changes in the frequency or amount of use: Mireya reports an overall decrease in her crack cocaine use.  Mireya states she does not feel that this is her problem at this time.  Therapist utilized stage appropriate interventions to  "address this substance abuse issue.  Therapist highlighted that Mireya's crack cocaine use could increase Mireya's more harmful mental health symptoms, especially related to her anxiety.  Mireya agreed and stated she felt if she moved from the area it would improve her chances of stopping this use. Stage of change for addressing substance use diagnoses: Contemplation    ASSESSMENT:  Mireya Gandhi presents with a Euthymic/ normal mood.     her affect is Normal range and intensity, which is congruent, with her mood and the content of the session. The client has made progress on their goals.     Mireya Gandhi presents with a none risk of suicide, none risk of self-harm, and none risk of harm to others.    For any risk assessment that surpasses a \"low\" rating, a safety plan must be developed.    A safety plan was indicated: no  If yes, describe in detail n/a    PLAN: Between sessions, Mireya Gandhi will begin walking around the mall to get out of the house and begin searching for rooms for rent outside of [place]. At the next session, the therapist will use Client-centered Therapy, Cognitive Behavioral Therapy, Motivational Interviewing, and Supportive Psychotherapy to address Mireya's need to learn coping skills and develop increased motivation to change her cocaine use.    Behavioral Health Treatment Plan and Discharge Planning: Mireya Gandhi is aware of and agrees to continue to work on their treatment plan. They have identified and are working toward their discharge goals. yes    Visit start and stop times:    05/23/24  Start Time: 1415  Stop Time: 1500  Total Visit Time: 45 minutes  "

## 2024-05-23 NOTE — BH TREATMENT PLAN
Outpatient Behavioral Health Psychotherapy Treatment Plan    Mireya Gandhi  1975     Date of Initial Psychotherapy Assessment: 11/06/2024   Date of Current Treatment Plan: 05/23/24  Treatment Plan Target Date: 11/23/2024  Treatment Plan Expiration Date: 7/23/2024    Diagnosis:   1. Opioid use disorder        2. Mood disorder (HCC)        3. PTSD (post-traumatic stress disorder)        4. Risk taking behavior            Area(s) of Need: mood issues. Cocaine use, recent passing of Significant other    Long Term Goal 1 (in the client's own words): Learning to deal with my feelings in a healthy way    Stage of Change: Action    Target Date for completion: 8/14/2024     Anticipated therapeutic modalities: CBT, supportive counseling     People identified to complete this goal: sadi Gomes      Objective 1: (identify the means of measuring success in meeting the objective): Be able to identify my feelings and how they are affecting me      Objective 2: (identify the means of measuring success in meeting the objective): Have learned healthy coping skills and take my medications      Long Term Goal 2 (in the client's own words): I have been off opiates for some time but I want to stop smoking crack.     Stage of Change: Contemplation    Target Date for completion: 8/14/2024     Anticipated therapeutic modalities: motivational interviewing, CBT/Relapse prevention     People identified to complete this goal: sadi Gomes, [facility] provider      Objective 1: (identify the means of measuring success in meeting the objective): I will be honest about my crack use and learn my triggers      Objective 2: (identify the means of measuring success in meeting the objective): I will learn healthy coping skills that keep me from using crack     Long Term Goal 3 (in the client's own words): I want to get through my grief and feel better    Stage of Change: Action    Target Date for completion: 8/14/2024     Anticipated  therapeutic modalities: CBT, grief counseling     People identified to complete this goal: sadi Gomes      Objective 1: (identify the means of measuring success in meeting the objective): I want to talk about my experience finding my significant other having passed and learn to express my feelings      Objective 2: (identify the means of measuring success in meeting the objective): I will be able to identify my feelings and learn to feel them     I am currently under the care of a [facility] psychiatric provider: no    My [facility] psychiatric provider is: n/a    I am currently taking psychiatric medications: Yes, as prescribed    I feel that I will be ready for discharge from mental health care when I reach the following (measurable goal/objective): I will have stopped my substance abuse for at least one year and will have at least 5 healthy skills to handle my distressful moods.    For children and adults who have a legal guardian:   Has there been any change to custody orders and/or guardianship status? NA. If yes, attach updated documentation.    I have created my Crisis Plan and have been offered a copy of this plan    Behavioral Health Treatment Plan [facility]: Diagnosis and Treatment Plan explained to Mireya Gandhi acknowledges an understanding of their diagnosis. Mireya Gandhi agrees to this treatment plan.    I have been offered a copy of this Treatment Plan. yes

## 2024-06-10 ENCOUNTER — HOSPITAL ENCOUNTER (EMERGENCY)
Facility: HOSPITAL | Age: 49
Discharge: HOME | End: 2024-06-10
Attending: EMERGENCY MEDICINE
Payer: MEDICARE

## 2024-06-10 ENCOUNTER — APPOINTMENT (EMERGENCY)
Dept: RADIOLOGY | Facility: HOSPITAL | Age: 49
End: 2024-06-10
Payer: MEDICARE

## 2024-06-10 VITALS
HEART RATE: 65 BPM | TEMPERATURE: 98.6 F | SYSTOLIC BLOOD PRESSURE: 126 MMHG | OXYGEN SATURATION: 96 % | BODY MASS INDEX: 24.09 KG/M2 | RESPIRATION RATE: 18 BRPM | DIASTOLIC BLOOD PRESSURE: 94 MMHG | WEIGHT: 163.1 LBS

## 2024-06-10 DIAGNOSIS — R07.9 CHEST PAIN, UNSPECIFIED: ICD-10-CM

## 2024-06-10 DIAGNOSIS — R00.2 PALPITATIONS: Primary | ICD-10-CM

## 2024-06-10 LAB
ALBUMIN SERPL BCP-MCNC: 4.4 G/DL (ref 3.5–5)
ALP SERPL-CCNC: 101 U/L (ref 34–104)
ALT SERPL W P-5'-P-CCNC: 13 U/L (ref 7–52)
ANION GAP SERPL CALCULATED.3IONS-SCNC: 8 MMOL/L (ref 4–13)
AST SERPL W P-5'-P-CCNC: 16 U/L (ref 13–39)
ATRIAL RATE: 74 BPM
BASOPHILS # BLD AUTO: 0.06 THOUSANDS/ÂΜL (ref 0–0.1)
BASOPHILS NFR BLD AUTO: 1 % (ref 0–1)
BILIRUB SERPL-MCNC: 0.45 MG/DL (ref 0.2–1)
BUN SERPL-MCNC: 12 MG/DL (ref 5–25)
CALCIUM SERPL-MCNC: 9.1 MG/DL (ref 8.4–10.2)
CARDIAC TROPONIN I PNL SERPL HS: <2 NG/L
CHLORIDE SERPL-SCNC: 101 MMOL/L (ref 96–108)
CO2 SERPL-SCNC: 25 MMOL/L (ref 21–32)
CREAT SERPL-MCNC: 0.8 MG/DL (ref 0.6–1.3)
D DIMER PPP FEU-MCNC: <0.27 UG/ML FEU
EOSINOPHIL # BLD AUTO: 0.41 THOUSAND/ÂΜL (ref 0–0.61)
EOSINOPHIL NFR BLD AUTO: 5 % (ref 0–6)
ERYTHROCYTE [DISTWIDTH] IN BLOOD BY AUTOMATED COUNT: 13.7 % (ref 11.6–15.1)
EXT PREGNANCY TEST URINE: NEGATIVE
EXT. CONTROL: NORMAL
GFR SERPL CREATININE-BSD FRML MDRD: 88 ML/MIN/1.73SQ M
GLUCOSE SERPL-MCNC: 120 MG/DL (ref 65–140)
HCT VFR BLD AUTO: 39.4 % (ref 34.8–46.1)
HGB BLD-MCNC: 12.8 G/DL (ref 11.5–15.4)
IMM GRANULOCYTES # BLD AUTO: 0.03 THOUSAND/UL (ref 0–0.2)
IMM GRANULOCYTES NFR BLD AUTO: 0 % (ref 0–2)
LYMPHOCYTES # BLD AUTO: 2.06 THOUSANDS/ÂΜL (ref 0.6–4.47)
LYMPHOCYTES NFR BLD AUTO: 25 % (ref 14–44)
MAGNESIUM SERPL-MCNC: 2.1 MG/DL (ref 1.9–2.7)
MCH RBC QN AUTO: 28.8 PG (ref 26.8–34.3)
MCHC RBC AUTO-ENTMCNC: 32.5 G/DL (ref 31.4–37.4)
MCV RBC AUTO: 89 FL (ref 82–98)
MONOCYTES # BLD AUTO: 0.39 THOUSAND/ÂΜL (ref 0.17–1.22)
MONOCYTES NFR BLD AUTO: 5 % (ref 4–12)
NEUTROPHILS # BLD AUTO: 5.37 THOUSANDS/ÂΜL (ref 1.85–7.62)
NEUTS SEG NFR BLD AUTO: 64 % (ref 43–75)
NRBC BLD AUTO-RTO: 0 /100 WBCS
P AXIS: 63 DEGREES
PLATELET # BLD AUTO: 261 THOUSANDS/UL (ref 149–390)
PMV BLD AUTO: 8.9 FL (ref 8.9–12.7)
POTASSIUM SERPL-SCNC: 4 MMOL/L (ref 3.5–5.3)
PR INTERVAL: 144 MS
PROT SERPL-MCNC: 7.4 G/DL (ref 6.4–8.4)
QRS AXIS: 22 DEGREES
QRSD INTERVAL: 86 MS
QT INTERVAL: 404 MS
QTC INTERVAL: 448 MS
RBC # BLD AUTO: 4.44 MILLION/UL (ref 3.81–5.12)
SODIUM SERPL-SCNC: 134 MMOL/L (ref 135–147)
T WAVE AXIS: 57 DEGREES
TSH SERPL DL<=0.05 MIU/L-ACNC: 0.48 UIU/ML (ref 0.45–4.5)
VENTRICULAR RATE: 74 BPM
WBC # BLD AUTO: 8.32 THOUSAND/UL (ref 4.31–10.16)

## 2024-06-10 PROCEDURE — 99285 EMERGENCY DEPT VISIT HI MDM: CPT | Performed by: EMERGENCY MEDICINE

## 2024-06-10 PROCEDURE — 99285 EMERGENCY DEPT VISIT HI MDM: CPT

## 2024-06-10 PROCEDURE — 36415 COLL VENOUS BLD VENIPUNCTURE: CPT | Performed by: EMERGENCY MEDICINE

## 2024-06-10 PROCEDURE — 71045 X-RAY EXAM CHEST 1 VIEW: CPT

## 2024-06-10 PROCEDURE — 84484 ASSAY OF TROPONIN QUANT: CPT | Performed by: EMERGENCY MEDICINE

## 2024-06-10 PROCEDURE — 81025 URINE PREGNANCY TEST: CPT | Performed by: EMERGENCY MEDICINE

## 2024-06-10 PROCEDURE — 93005 ELECTROCARDIOGRAM TRACING: CPT

## 2024-06-10 PROCEDURE — 84443 ASSAY THYROID STIM HORMONE: CPT | Performed by: EMERGENCY MEDICINE

## 2024-06-10 PROCEDURE — 85379 FIBRIN DEGRADATION QUANT: CPT | Performed by: EMERGENCY MEDICINE

## 2024-06-10 PROCEDURE — 83735 ASSAY OF MAGNESIUM: CPT | Performed by: EMERGENCY MEDICINE

## 2024-06-10 PROCEDURE — 85025 COMPLETE CBC W/AUTO DIFF WBC: CPT | Performed by: EMERGENCY MEDICINE

## 2024-06-10 PROCEDURE — 93010 ELECTROCARDIOGRAM REPORT: CPT | Performed by: INTERNAL MEDICINE

## 2024-06-10 PROCEDURE — 80053 COMPREHEN METABOLIC PANEL: CPT | Performed by: EMERGENCY MEDICINE

## 2024-06-10 RX ORDER — LORAZEPAM 1 MG/1
2 TABLET ORAL ONCE
Status: COMPLETED | OUTPATIENT
Start: 2024-06-10 | End: 2024-06-10

## 2024-06-10 RX ADMIN — LORAZEPAM 2 MG: 1 TABLET ORAL at 18:22

## 2024-06-10 NOTE — ED PROVIDER NOTES
History  Chief Complaint   Patient presents with    Chest Pain     Chest pain and palpitations for a couple days. Reports pain is in the RT side of the chest. Reports its hurts when she breaths. Reports she is also getting palpitations. Reports nausea.      Patient is a 49-year-old female.  Cardiac risk factors include smoking.  She denies history of hypertension, diabetes, or hypercholesterolemia.  No family history of early coronary artery disease.  She has a history of cardiomyopathy during pregnancy.  She has a history of thyroid disease.  Patient reports that she has a history of Graves' disease.  Currently not on thyroid medications.  History of anxiety and crack cocaine abuse.  Patient has had issues with palpitations in the past.  She presents to the emergency room with a couple day history of palpitations.  She is worried because she lives alone and does not want anything to happen to her.  She is not near syncopal or syncopal with the palpitations.  She is experiencing some right-sided chest pain.  She only had 2 or 3 brief episodes of this chest pain.  It did not radiate.  It was a sharp pain with breathing.  She did report some shortness of breath.  No nausea or diaphoresis.  The pain was not exertional.  No back pain or migration.  No calf pain unilateral leg swelling.  No hemoptysis.  Patient is mostly worried about the palpitations.        Prior to Admission Medications   Prescriptions Last Dose Informant Patient Reported? Taking?   ALPRAZolam (XANAX) 1 mg tablet   Yes No   Sig: Take 1 mg by mouth 4 (four) times a day as needed for anxiety   Multiple Vitamin (multivitamin) tablet   Yes No   Sig: Take 1 tablet by mouth daily   aluminum-magnesium hydroxide-simethicone (MAALOX MAX) 400-400-40 MG/5ML suspension   No No   Sig: Take 10 mL by mouth every 6 (six) hours as needed for indigestion or heartburn (nausea)   buprenorphine-naloxone (Suboxone) 12-3 MG   No No   Sig: Place 1 Film (12 mg total) under  the tongue 2 (two) times a day   famotidine (PEPCID) 40 MG tablet   No No   Sig: Take 1 tablet (40 mg total) by mouth daily at bedtime as needed for heartburn   guaiFENesin (MUCINEX) 600 mg 12 hr tablet   No No   Sig: Take 1 tablet (600 mg total) by mouth every 12 (twelve) hours   hydrOXYzine HCL (ATARAX) 25 mg tablet   No No   Sig: Take 2 tablets (50 mg total) by mouth every 6 (six) hours as needed for anxiety   hydrOXYzine pamoate (VISTARIL) 50 mg capsule   No No   Sig: Take 1 capsule (50 mg total) by mouth 3 (three) times a day   ibuprofen (MOTRIN) 600 mg tablet   No No   Sig: Take 1 tablet (600 mg total) by mouth every 6 (six) hours as needed for mild pain   magnesium oxide (MAG-OX) 400 mg   No No   Sig: Take 2 tablets (800 mg total) by mouth 2 (two) times a day for 7 days   methimazole (TAPAZOLE) 5 mg tablet   Yes No   Sig: Take 2.5 mg by mouth daily   mirtazapine (REMERON SOL-TAB) 30 mg dispersible tablet   Yes No   Sig: Take 30 mg by mouth   Patient not taking: Reported on 8/18/2022   naloxone (NARCAN) 4 mg/0.1 mL nasal spray   No No   Sig: Administer 1 spray into a nostril. If no response after 2-3 minutes, give another dose in the other nostril using a new spray.   omeprazole (PriLOSEC) 40 MG capsule   Yes No   prazosin (MINIPRESS) 2 mg capsule   No No   Sig: Take 1 capsule (2 mg total) by mouth daily at bedtime   ziprasidone (GEODON) 80 mg capsule   Yes No   Sig: Take 80 mg by mouth daily at bedtime   ziprasidone (GEODON) 80 mg capsule   Yes No   Sig: Take 40 mg by mouth every morning      Facility-Administered Medications: None       Past Medical History:   Diagnosis Date    Cardiomyopathy (HCC)     Elevated transaminase measurement     Irritable bowel syndrome (IBS)     with Constipation    Nutritional deficiency     Overactive thyroid gland     Palpitations     Sweating abnormality     Vaginal infection     Vitamin D insufficiency        Past Surgical History:   Procedure Laterality Date    ANKLE  SURGERY Left     FINGER SURGERY      TUBAL LIGATION      UPPER GASTROINTESTINAL ENDOSCOPY         Family History   Problem Relation Age of Onset    No Known Problems Mother     Diabetes Father     Cancer Father      I have reviewed and agree with the history as documented.    E-Cigarette/Vaping    E-Cigarette Use Never User      E-Cigarette/Vaping Substances     Social History     Tobacco Use    Smoking status: Every Day     Current packs/day: 0.50     Average packs/day: 0.5 packs/day for 30.0 years (15.0 ttl pk-yrs)     Types: Cigarettes    Smokeless tobacco: Never   Vaping Use    Vaping status: Never Used   Substance Use Topics    Alcohol use: Not Currently     Comment: occasionally    Drug use: Yes     Types: Cocaine, Marijuana       Review of Systems   Constitutional:  Negative for chills and fever.   HENT:  Negative for rhinorrhea and sore throat.    Eyes:  Negative for pain, redness and visual disturbance.   Respiratory:  Positive for shortness of breath. Negative for cough.    Cardiovascular:  Positive for chest pain and palpitations. Negative for leg swelling.   Gastrointestinal:  Negative for abdominal pain, diarrhea and vomiting.   Endocrine: Negative for polydipsia and polyuria.   Genitourinary:  Negative for dysuria, frequency, hematuria, vaginal bleeding and vaginal discharge.   Musculoskeletal:  Negative for back pain and neck pain.   Skin:  Negative for rash and wound.   Allergic/Immunologic: Negative for immunocompromised state.   Neurological:  Negative for weakness, numbness and headaches.   Hematological:  Does not bruise/bleed easily.   Psychiatric/Behavioral:  Negative for hallucinations and suicidal ideas. The patient is nervous/anxious.    All other systems reviewed and are negative.      Physical Exam  Physical Exam  Vitals reviewed.   Constitutional:       General: She is not in acute distress.  HENT:      Head: Normocephalic and atraumatic.      Nose: Nose normal.      Mouth/Throat:       Mouth: Mucous membranes are moist.   Eyes:      General:         Right eye: No discharge.         Left eye: No discharge.      Conjunctiva/sclera: Conjunctivae normal.   Cardiovascular:      Rate and Rhythm: Normal rate and regular rhythm.      Pulses: Normal pulses.      Heart sounds: Normal heart sounds. No murmur heard.     No friction rub. No gallop.   Pulmonary:      Effort: Pulmonary effort is normal. No respiratory distress.      Breath sounds: Normal breath sounds. No stridor. No decreased breath sounds, wheezing, rhonchi or rales.   Abdominal:      General: Bowel sounds are normal. There is no distension.      Palpations: Abdomen is soft.      Tenderness: There is no abdominal tenderness. There is no right CVA tenderness, left CVA tenderness, guarding or rebound.   Musculoskeletal:         General: No swelling, tenderness, deformity or signs of injury. Normal range of motion.      Cervical back: Normal range of motion and neck supple. No rigidity.      Right lower leg: No edema.      Left lower leg: No edema.      Comments: No calf tenderness or unilateral leg swelling.   Skin:     General: Skin is warm and dry.      Coloration: Skin is not jaundiced.      Findings: No rash.   Neurological:      General: No focal deficit present.      Mental Status: She is alert and oriented to person, place, and time.      Sensory: No sensory deficit.      Motor: Motor function is intact.   Psychiatric:         Mood and Affect: Mood is anxious.         Behavior: Behavior normal.         Vital Signs  ED Triage Vitals [06/10/24 1750]   Temperature Pulse Respirations Blood Pressure SpO2   98.6 °F (37 °C) 73 20 152/96 96 %      Temp Source Heart Rate Source Patient Position - Orthostatic VS BP Location FiO2 (%)   Tympanic Monitor Sitting Right arm --      Pain Score       --           Vitals:    06/10/24 1750 06/10/24 1927   BP: 152/96 126/94   Pulse: 73 65   Patient Position - Orthostatic VS: Sitting          Visual  Acuity      ED Medications  Medications   LORazepam (ATIVAN) tablet 2 mg (2 mg Oral Given 6/10/24 1822)       Diagnostic Studies  Results Reviewed       Procedure Component Value Units Date/Time    POCT pregnancy, urine [[id number]]  (Normal) Resulted: 06/10/24 1919    Lab Status: Final result Updated: 06/10/24 1919     EXT Preg Test, Ur Negative     Control Valid    TSH, 3rd generation with Free T4 reflex [[id number]]  (Normal) Collected: 06/10/24 1816    Lab Status: Final result Specimen: Blood from Arm, Right Updated: 06/10/24 1918     TSH 3RD GENERATON 0.484 uIU/mL     HS Troponin I 2hr [[id number]]     Lab Status: No result Specimen: Blood     HS Troponin 0hr (reflex protocol) [[id number]]  (Normal) Collected: 06/10/24 1816    Lab Status: Final result Specimen: Blood from Arm, Right Updated: 06/10/24 1909     hs TnI 0hr <2 ng/L     Comprehensive metabolic panel [[id number]]  (Abnormal) Collected: 06/10/24 1816    Lab Status: Final result Specimen: Blood from Arm, Right Updated: 06/10/24 1904     Sodium 134 mmol/L      Potassium 4.0 mmol/L      Chloride 101 mmol/L      CO2 25 mmol/L      ANION GAP 8 mmol/L      BUN 12 mg/dL      Creatinine 0.80 mg/dL      Glucose 120 mg/dL      Calcium 9.1 mg/dL      AST 16 U/L      ALT 13 U/L      Alkaline Phosphatase 101 U/L      Total Protein 7.4 g/dL      Albumin 4.4 g/dL      Total Bilirubin 0.45 mg/dL      eGFR 88 ml/min/1.73sq m     Narrative:      National Kidney Disease Foundation guidelines for Chronic Kidney Disease (CKD):     Stage 1 with normal or high GFR (GFR > 90 mL/min/1.73 square meters)    Stage 2 Mild CKD (GFR = 60-89 mL/min/1.73 square meters)    Stage 3A Moderate CKD (GFR = 45-59 mL/min/1.73 square meters)    Stage 3B Moderate CKD (GFR = 30-44 mL/min/1.73 square meters)    Stage 4 Severe CKD (GFR = 15-29 mL/min/1.73 square meters)    Stage 5 End Stage CKD (GFR <15 mL/min/1.73 square meters)  Note: GFR calculation is accurate only with a steady state creatinine     Magnesium [[id number]]  (Normal) Collected: 06/10/24 1816    Lab Status: Final result Specimen: Blood from Arm, Right Updated: 06/10/24 1904     Magnesium 2.1 mg/dL     D-Dimer [[id number]]  (Normal) Collected: 06/10/24 1816    Lab Status: Final result Specimen: Blood from Arm, Right Updated: 06/10/24 1859     D-Dimer, Quant <0.27 ug/ml FEU     CBC and differential [[id number]] Collected: 06/10/24 1816    Lab Status: Final result Specimen: Blood from Arm, Right Updated: 06/10/24 1832     WBC 8.32 Thousand/uL      RBC 4.44 Million/uL      Hemoglobin 12.8 g/dL      Hematocrit 39.4 %      MCV 89 fL      MCH 28.8 pg      MCHC 32.5 g/dL      RDW 13.7 %      MPV 8.9 fL      Platelets 261 Thousands/uL      nRBC 0 /100 WBCs      Segmented % 64 %      Immature Grans % 0 %      Lymphocytes % 25 %      Monocytes % 5 %      Eosinophils Relative 5 %      Basophils Relative 1 %      Absolute Neutrophils 5.37 Thousands/µL      Absolute Immature Grans 0.03 Thousand/uL      Absolute Lymphocytes 2.06 Thousands/µL      Absolute Monocytes 0.39 Thousand/µL      Eosinophils Absolute 0.41 Thousand/µL      Basophils Absolute 0.06 Thousands/µL                    XR chest 1 view portable   ED Interpretation by Good Thompson MD (06/10 1901)   No widened mediastinum.  No pneumothorax.  No CHF or infiltrates.                 Procedures  ECG 12 Lead Documentation Only    Date/Time: 6/10/2024 6:11 PM    Performed by: Good Thompson MD  Authorized by: Good Thompson MD    ECG reviewed by me, the ED Provider: yes    Patient location:  ED  Comments:      Normal sinus rhythm.  Septal infarct, age undetermined.  Abnormal EKG.           ED Course             HEART Risk Score      Flowsheet Row Most Recent Value   Heart Score Risk Calculator    History 0 Filed at: 06/10/2024 1910   ECG 0 Filed at: 06/10/2024 1910   Age 1 Filed at: 06/10/2024 1910   Risk Factors 1 Filed at: 06/10/2024 1910   Troponin 0 Filed at: 06/10/2024 1910   HEART Score 2  Filed at: 06/10/2024 1910                                        Medical Decision Making  No arrhythmia.  No hyperthyroidism.  No electrolyte abnormality.  No anemia or hypoglycemia.  Patient ruled out for acute myocardial infarction.  D-dimer is negative making pulmonary embolism unlikely.  There is no pneumonia or pneumothorax.  No back pain or migration to suggest dissection.  Normal mediastinum on chest x-ray.  Equal pulses.  No EKG or troponin evidence of pericarditis or myocarditis.  Most likely anxiety.  Cocaine abuse may be a contributing factor.  Appropriate for discharge and outpatient management.    Amount and/or Complexity of Data Reviewed  Labs: ordered. Decision-making details documented in ED Course.  Radiology: ordered and independent interpretation performed. Decision-making details documented in ED Course.  ECG/medicine tests: ordered and independent interpretation performed. Decision-making details documented in ED Course.    Risk  Prescription drug management.  Decision regarding hospitalization.             Disposition  Final diagnoses:   Palpitations   Chest pain, unspecified     Time reflects when diagnosis was documented in both MDM as applicable and the Disposition within this note       Time User Action Codes Description Comment    6/10/2024  7:32 PM Good Thompson Add [R00.2] Palpitations     6/10/2024  7:32 PM Good Thompson Add [R07.9] Chest pain, unspecified           ED Disposition       ED Disposition   Discharge    Condition   Stable    Date/Time   Mon Diego 10, 2024 1932    Comment   Mireya Gandhi discharge to home/self care.                   Follow-up Information       Follow up With Specialties Details Why Contact Kaya Burgos  In 2 days  [address] [place] [address]  [address]  [facility] [address]  [phone]              Patient's Medications   Discharge Prescriptions    No medications on file       No discharge procedures on file.    PDMP Review         Value Time  User    PDMP Reviewed  Yes 3/7/2024  2:15 PM Brigette Davis PA-C            ED Provider  Electronically Signed by             Good Thompson MD  06/10/24 1933

## 2024-06-11 ENCOUNTER — TELEPHONE (OUTPATIENT)
Dept: OTHER | Age: 49
End: 2024-06-11

## 2024-06-11 ENCOUNTER — TELEPHONE (OUTPATIENT)
Dept: PSYCHIATRY | Facility: CLINIC | Age: 49
End: 2024-06-11

## 2024-06-11 NOTE — TELEPHONE ENCOUNTER
Patient is calling regarding cancelling an appointment.    Date/Time: 6/11 3pm    Reason: scheduling conflict    Patient was rescheduled: YES [] NO [x]  Pt stated she will call back to reschedule because she is currently at another appt

## 2024-06-11 NOTE — TELEPHONE ENCOUNTER
Mireya called to schedule an appt with VANESSA Tillman Therapist. Per Laron, Mireya is to schedule an appt with Leti MCKENZIE CM, before seeing [facility]. Explained to Mireya that she must make an appt with our CM before seeing Laron, since she has missed appts in the past. Mireya was agreeable to seeing Leti MCKENZIE. Schedule her with Leti on 6/13/24 at 11 am.    For your review.

## 2024-06-13 ENCOUNTER — OFFICE VISIT (OUTPATIENT)
Dept: OTHER | Age: 49
End: 2024-06-13
Payer: MEDICARE

## 2024-06-13 ENCOUNTER — OFFICE VISIT (OUTPATIENT)
Dept: PSYCHIATRY | Facility: CLINIC | Age: 49
End: 2024-06-13

## 2024-06-13 VITALS
BODY MASS INDEX: 23.7 KG/M2 | SYSTOLIC BLOOD PRESSURE: 135 MMHG | WEIGHT: 160 LBS | DIASTOLIC BLOOD PRESSURE: 92 MMHG | HEIGHT: 69 IN | HEART RATE: 80 BPM

## 2024-06-13 DIAGNOSIS — F11.90 OPIOID USE DISORDER: Primary | ICD-10-CM

## 2024-06-13 DIAGNOSIS — F19.90 POLYSUBSTANCE USE DISORDER: Primary | ICD-10-CM

## 2024-06-13 DIAGNOSIS — F19.90 POLYSUBSTANCE USE DISORDER: ICD-10-CM

## 2024-06-13 PROCEDURE — 99212 OFFICE O/P EST SF 10 MIN: CPT

## 2024-06-13 PROCEDURE — NC001 PR NO CHARGE

## 2024-06-13 RX ORDER — BUPRENORPHINE AND NALOXONE 12; 3 MG/1; MG/1
12 FILM, SOLUBLE BUCCAL; SUBLINGUAL 2 TIMES DAILY
Qty: 60 FILM | Refills: 1 | Status: SHIPPED | OUTPATIENT
Start: 2024-06-13 | End: 2024-08-12

## 2024-06-13 NOTE — ASSESSMENT & PLAN NOTE
Patient reports she is doing well from MOUD standpoint   Denies any cravings or re-occurrence of use   Will continue current medication   PDMP reviewed   Follow up in 2 months

## 2024-06-13 NOTE — PROGRESS NOTES
[facility]    Progress Note  Mireya Gandhi 49 y.o. female MRN: [id number]  @ Encounter: [id number]      1. Opioid use disorder  Assessment & Plan:  Patient reports she is doing well from MOUD standpoint   Denies any cravings or re-occurrence of use   Will continue current medication   PDMP reviewed   Follow up in 2 months   Orders:  -     buprenorphine-naloxone (Suboxone) 12-3 MG; Place 1 Film (12 mg total) under the tongue 2 (two) times a day  2. Polysubstance use disorder  Assessment & Plan:  Continues intermittent use of crack cocaine   Reports last use was 5 days ago   Encouraged Cessation         Support Services  Case Management and Certified  are following.   Patient was referred to the [facility] Certified Addiction Counselors: Yes   The patient is actively engaged with the [facility] Certified Addiction Counselor’s psychotherapy plan: Yes    buprenorphine-naloxone      PDMP reviewed:     PDMP Review         Value Time User    PDMP Reviewed  Yes 6/13/2024  2:43 PM Brigette Davis PA-C            SUBJECTIVE  Patient seen for follow up appointment. Denies any re-occurrence of use. States that she was in the ED recently for chest pain- intermittently using crack cocaine.     Drug cravings: denies  Motivation to continue treatment: high       Current Outpatient Medications:     buprenorphine-naloxone (Suboxone) 12-3 MG, Place 1 Film (12 mg total) under the tongue 2 (two) times a day, Disp: 60 Film, Rfl: 1    ALPRAZolam (XANAX) 1 mg tablet, Take 1 mg by mouth 4 (four) times a day as needed for anxiety, Disp: , Rfl:     aluminum-magnesium hydroxide-simethicone (MAALOX MAX) 400-400-40 MG/5ML suspension, Take 10 mL by mouth every 6 (six) hours as needed for indigestion or heartburn (nausea), Disp: 355 mL, Rfl: 0    famotidine (PEPCID) 40 MG tablet, Take 1 tablet (40 mg total) by mouth daily at bedtime as needed for heartburn, Disp: 20 tablet, Rfl: 0    guaiFENesin (MUCINEX)  600 mg 12 hr tablet, Take 1 tablet (600 mg total) by mouth every 12 (twelve) hours, Disp: 14 tablet, Rfl: 0    hydrOXYzine HCL (ATARAX) 25 mg tablet, Take 2 tablets (50 mg total) by mouth every 6 (six) hours as needed for anxiety, Disp: 120 tablet, Rfl: 0    hydrOXYzine pamoate (VISTARIL) 50 mg capsule, Take 1 capsule (50 mg total) by mouth 3 (three) times a day, Disp: 90 capsule, Rfl: 1    ibuprofen (MOTRIN) 600 mg tablet, Take 1 tablet (600 mg total) by mouth every 6 (six) hours as needed for mild pain, Disp: 40 tablet, Rfl: 0    magnesium oxide (MAG-OX) 400 mg, Take 2 tablets (800 mg total) by mouth 2 (two) times a day for 7 days, Disp: 28 tablet, Rfl: 0    methimazole (TAPAZOLE) 5 mg tablet, Take 2.5 mg by mouth daily, Disp: , Rfl:     mirtazapine (REMERON SOL-TAB) 30 mg dispersible tablet, Take 30 mg by mouth (Patient not taking: Reported on 8/18/2022), Disp: , Rfl:     Multiple Vitamin (multivitamin) tablet, Take 1 tablet by mouth daily (Patient not taking: Reported on 6/13/2024), Disp: , Rfl:     naloxone (NARCAN) 4 mg/0.1 mL nasal spray, Administer 1 spray into a nostril. If no response after 2-3 minutes, give another dose in the other nostril using a new spray., Disp: 1 each, Rfl: 5    omeprazole (PriLOSEC) 40 MG capsule, , Disp: , Rfl:     prazosin (MINIPRESS) 2 mg capsule, Take 1 capsule (2 mg total) by mouth daily at bedtime, Disp: 30 capsule, Rfl: 0    ziprasidone (GEODON) 80 mg capsule, Take 80 mg by mouth daily at bedtime, Disp: , Rfl:     ziprasidone (GEODON) 80 mg capsule, Take 40 mg by mouth every morning (Patient not taking: Reported on 6/13/2024), Disp: , Rfl:     Objective     Vitals:    06/13/24 1415   BP: 135/92   Pulse: 80       Physical Exam  Neurological:      Mental Status: She is alert and oriented to person, place, and time.   Psychiatric:         Behavior: Behavior is cooperative.              Lab Results:   Results from last 6 Months   Lab Units 06/10/24  1816   WBC Thousand/uL 8.32    HEMOGLOBIN g/dL 12.8   HEMATOCRIT % 39.4   PLATELETS Thousands/uL 261      Results from last 6 Months   Lab Units 06/10/24  1816   POTASSIUM mmol/L 4.0   CHLORIDE mmol/L 101   CO2 mmol/L 25   BUN mg/dL 12   CREATININE mg/dL 0.80   CALCIUM mg/dL 9.1   ALBUMIN g/dL 4.4   ALK PHOS U/L 101   ALT U/L 13   AST U/L 16                         Counseling / Coordination of Care  Total time spent today 20 minutes. Greater than 50% of total time was spent with the patient and / or family counseling and / or coordination of care.     Brigette Davis PA-C

## 2024-06-14 NOTE — ASSESSMENT & PLAN NOTE
Continues intermittent use of crack cocaine   Reports last use was 5 days ago   Encouraged Cessation

## 2024-06-17 ENCOUNTER — HOSPITAL ENCOUNTER (EMERGENCY)
Facility: HOSPITAL | Age: 49
Discharge: HOME | End: 2024-06-17
Attending: EMERGENCY MEDICINE | Admitting: EMERGENCY MEDICINE
Payer: MEDICARE

## 2024-06-17 VITALS
OXYGEN SATURATION: 98 % | RESPIRATION RATE: 18 BRPM | BODY MASS INDEX: 24.04 KG/M2 | SYSTOLIC BLOOD PRESSURE: 116 MMHG | TEMPERATURE: 98.2 F | WEIGHT: 162.8 LBS | HEART RATE: 70 BPM | DIASTOLIC BLOOD PRESSURE: 63 MMHG

## 2024-06-17 DIAGNOSIS — N39.0 UTI (URINARY TRACT INFECTION): Primary | ICD-10-CM

## 2024-06-17 LAB
BACTERIA UR QL AUTO: NORMAL /HPF
BILIRUB UR QL STRIP: ABNORMAL
CLARITY UR: CLEAR
COLOR UR: ABNORMAL
EXT PREGNANCY TEST URINE: NEGATIVE
EXT. CONTROL: NORMAL
GLUCOSE UR STRIP-MCNC: NEGATIVE MG/DL
HGB UR QL STRIP.AUTO: NEGATIVE
KETONES UR STRIP-MCNC: NEGATIVE MG/DL
LEUKOCYTE ESTERASE UR QL STRIP: 25
NITRITE UR QL STRIP: POSITIVE
NON-SQ EPI CELLS URNS QL MICRO: NORMAL /HPF
PH UR STRIP.AUTO: 7 [PH]
PROT UR STRIP-MCNC: NEGATIVE MG/DL
RBC #/AREA URNS AUTO: NORMAL /HPF
SP GR UR STRIP.AUTO: 1.01 (ref 1–1.04)
UROBILINOGEN UA: 8 MG/DL
WBC #/AREA URNS AUTO: NORMAL /HPF

## 2024-06-17 PROCEDURE — 81001 URINALYSIS AUTO W/SCOPE: CPT

## 2024-06-17 PROCEDURE — 81003 URINALYSIS AUTO W/O SCOPE: CPT

## 2024-06-17 PROCEDURE — 99284 EMERGENCY DEPT VISIT MOD MDM: CPT

## 2024-06-17 PROCEDURE — 81025 URINE PREGNANCY TEST: CPT

## 2024-06-17 PROCEDURE — 99283 EMERGENCY DEPT VISIT LOW MDM: CPT

## 2024-06-17 RX ORDER — CEPHALEXIN 500 MG/1
500 CAPSULE ORAL EVERY 12 HOURS SCHEDULED
Qty: 14 CAPSULE | Refills: 0 | Status: SHIPPED | OUTPATIENT
Start: 2024-06-17 | End: 2024-06-24

## 2024-06-17 NOTE — ED PROVIDER NOTES
History  Chief Complaint   Patient presents with    Blood in Urine     Urgency burning and frequency with blood noted in her urine x 1 week     Mireya is a 49 year old female presenting to the ED for urinary urgency, frequency x 1 week. Notes she believed she saw blood but was taking an over the counter medication for symptomatic relief that is known to change the urine a dark color. There is associated suprapubic pressure but no dysuria, flank pain, vaginal concern, fever.        Prior to Admission Medications   Prescriptions Last Dose Informant Patient Reported? Taking?   ALPRAZolam (XANAX) 1 mg tablet   Yes No   Sig: Take 1 mg by mouth 4 (four) times a day as needed for anxiety   Multiple Vitamin (multivitamin) tablet   Yes No   Sig: Take 1 tablet by mouth daily   Patient not taking: Reported on 6/13/2024   aluminum-magnesium hydroxide-simethicone (MAALOX MAX) 400-400-40 MG/5ML suspension   No No   Sig: Take 10 mL by mouth every 6 (six) hours as needed for indigestion or heartburn (nausea)   buprenorphine-naloxone (Suboxone) 12-3 MG   No No   Sig: Place 1 Film (12 mg total) under the tongue 2 (two) times a day   famotidine (PEPCID) 40 MG tablet   No No   Sig: Take 1 tablet (40 mg total) by mouth daily at bedtime as needed for heartburn   guaiFENesin (MUCINEX) 600 mg 12 hr tablet   No No   Sig: Take 1 tablet (600 mg total) by mouth every 12 (twelve) hours   hydrOXYzine HCL (ATARAX) 25 mg tablet   No No   Sig: Take 2 tablets (50 mg total) by mouth every 6 (six) hours as needed for anxiety   hydrOXYzine pamoate (VISTARIL) 50 mg capsule   No No   Sig: Take 1 capsule (50 mg total) by mouth 3 (three) times a day   ibuprofen (MOTRIN) 600 mg tablet   No No   Sig: Take 1 tablet (600 mg total) by mouth every 6 (six) hours as needed for mild pain   magnesium oxide (MAG-OX) 400 mg   No No   Sig: Take 2 tablets (800 mg total) by mouth 2 (two) times a day for 7 days   methimazole (TAPAZOLE) 5 mg tablet   Yes No   Sig: Take  2.5 mg by mouth daily   mirtazapine (REMERON SOL-TAB) 30 mg dispersible tablet   Yes No   Sig: Take 30 mg by mouth   Patient not taking: Reported on 8/18/2022   naloxone (NARCAN) 4 mg/0.1 mL nasal spray   No No   Sig: Administer 1 spray into a nostril. If no response after 2-3 minutes, give another dose in the other nostril using a new spray.   omeprazole (PriLOSEC) 40 MG capsule   Yes No   prazosin (MINIPRESS) 2 mg capsule   No No   Sig: Take 1 capsule (2 mg total) by mouth daily at bedtime   ziprasidone (GEODON) 80 mg capsule   Yes No   Sig: Take 80 mg by mouth daily at bedtime   ziprasidone (GEODON) 80 mg capsule   Yes No   Sig: Take 40 mg by mouth every morning   Patient not taking: Reported on 6/13/2024      Facility-Administered Medications: None       Past Medical History:   Diagnosis Date    Cardiomyopathy (HCC)     Elevated transaminase measurement     Irritable bowel syndrome (IBS)     with Constipation    Nutritional deficiency     Overactive thyroid gland     Palpitations     Sweating abnormality     Vaginal infection     Vitamin D insufficiency        Past Surgical History:   Procedure Laterality Date    ANKLE SURGERY Left     FINGER SURGERY      TUBAL LIGATION      UPPER GASTROINTESTINAL ENDOSCOPY         Family History   Problem Relation Age of Onset    No Known Problems Mother     Diabetes Father     Cancer Father      I have reviewed and agree with the history as documented.    E-Cigarette/Vaping    E-Cigarette Use Never User      E-Cigarette/Vaping Substances     Social History     Tobacco Use    Smoking status: Every Day     Current packs/day: 0.50     Average packs/day: 0.5 packs/day for 30.0 years (15.0 ttl pk-yrs)     Types: Cigarettes    Smokeless tobacco: Never   Vaping Use    Vaping status: Never Used   Substance Use Topics    Alcohol use: Not Currently     Comment: occasionally    Drug use: Yes     Types: Cocaine, Marijuana       Review of Systems   Constitutional:  Negative for chills and  fever.   Eyes:  Negative for photophobia and visual disturbance.   Respiratory:  Negative for cough and shortness of breath.    Cardiovascular:  Negative for chest pain and palpitations.   Gastrointestinal:  Positive for abdominal pain. Negative for diarrhea, nausea and vomiting.   Genitourinary:  Positive for frequency and urgency. Negative for decreased urine volume, dysuria, flank pain, vaginal bleeding, vaginal discharge and vaginal pain.   Musculoskeletal:  Negative for myalgias, neck pain and neck stiffness.   Skin:  Negative for rash.   Neurological:  Negative for light-headedness and headaches.       Physical Exam  Physical Exam  Vitals reviewed.   Constitutional:       General: She is not in acute distress.     Appearance: Normal appearance. She is not ill-appearing, toxic-appearing or diaphoretic.   HENT:      Head: Normocephalic and atraumatic.      Right Ear: External ear normal.      Left Ear: External ear normal.      Nose: Nose normal.   Cardiovascular:      Rate and Rhythm: Normal rate and regular rhythm.      Pulses: Normal pulses.   Pulmonary:      Effort: Pulmonary effort is normal. No respiratory distress.   Abdominal:      Palpations: Abdomen is soft.      Tenderness: There is abdominal tenderness in the suprapubic area. There is no right CVA tenderness, left CVA tenderness, guarding or rebound.   Musculoskeletal:         General: Normal range of motion.      Cervical back: Normal range of motion and neck supple. No rigidity or tenderness.   Lymphadenopathy:      Cervical: No cervical adenopathy.   Skin:     General: Skin is warm and dry.      Capillary Refill: Capillary refill takes less than 2 seconds.   Neurological:      General: No focal deficit present.      Mental Status: She is alert and oriented to person, place, and time.   Psychiatric:         Mood and Affect: Mood normal.         Behavior: Behavior normal.         Vital Signs  ED Triage Vitals [06/17/24 1550]   Temperature Pulse  Respirations Blood Pressure SpO2   98.2 °F (36.8 °C) 64 16 124/71 98 %      Temp Source Heart Rate Source Patient Position - Orthostatic VS BP Location FiO2 (%)   Oral Monitor Sitting Left arm --      Pain Score       --           Vitals:    06/17/24 1550 06/17/24 1614   BP: 124/71 116/63   Pulse: 64 70   Patient Position - Orthostatic VS: Sitting Sitting         Visual Acuity      ED Medications  Medications - No data to display    Diagnostic Studies  Results Reviewed       Procedure Component Value Units Date/Time    Urine Microscopic [[id number]]  (Normal) Collected: 06/17/24 1556    Lab Status: Final result Specimen: Urine, Clean Catch Updated: 06/17/24 1636     RBC, UA None Seen /hpf      WBC, UA 2-4 /hpf      Epithelial Cells Occasional /hpf      Bacteria, UA Occasional /hpf     UA w Reflex to Microscopic w Reflex to Culture [[id number]]  (Abnormal) Collected: 06/17/24 1556    Lab Status: Final result Specimen: Urine, Clean Catch Updated: 06/17/24 1622     Color, UA Brown     Clarity, UA Clear     Specific Gravity, UA 1.010     pH, UA 7.0     Leukocytes, UA 25.0     Nitrite, UA Positive     Protein, UA Negative mg/dl      Glucose, UA Negative mg/dl      Ketones, UA Negative mg/dl      Bilirubin, UA 3 mg/dL     Occult Blood, UA Negative     UROBILINOGEN UA 8.0 mg/dL     POCT pregnancy, urine [[id number]]  (Normal) Resulted: 06/17/24 1606    Lab Status: Final result Updated: 06/17/24 1606     EXT Preg Test, Ur Negative     Control Valid                   No orders to display              Procedures  Procedures         ED Course  ED Course as of 06/17/24 1639   Mon Jun 17, 2024   1608 PREGNANCY TEST URINE: Negative   1626 Leukocytes, UA(!): 25.0   1626 Nitrite, UA(!): Positive   1626 Blood, UA: Negative                                             Medical Decision Making  49 year old female presenting with urinary urgency, frequency x 1 week with suprapubic pressure. There is no flank pain for concern of  "pyelonephritis or nephrolithiasis. UA consistent with UTI, plan to treat with cephalexin. Discussed supportive care. Pt stable at time of discharge, vital signs reviewed, questions answered. Strict ER return precautions provided/discussed and were well understood by patient. Patient's vitals, labs and/or imaging results, diagnosis, and treatment plan were discussed with the patient. All new and/or changed medications were discussed - specifically to include route of administration, how often to take, when to take, and the pharmacy they were sent to. Strict return precautions as well as close follow up with PCP was discussed with the patient and the patient was agreeable to my recommendations.  Patient verbally acknowledged understanding. All labs, imaging were reviewed and used in the medical decision making process (if ordered).     Portions of this chart may have been written with voice recognition software.  Occasional grammatical errors, wrong word or \"sound a like\" substitutions may have occurred due to software limitations.  Please read carefully and use context to recognize where substitutions have occurred.      Problems Addressed:  UTI (urinary tract infection): acute illness or injury    Amount and/or Complexity of Data Reviewed  Labs: ordered. Decision-making details documented in ED Course.    Risk  Prescription drug management.             Disposition  Final diagnoses:   UTI (urinary tract infection)     Time reflects when diagnosis was documented in both MDM as applicable and the Disposition within this note       Time User Action Codes Description Comment    6/17/2024  4:30 PM Mar Keene Add [N39.0] UTI (urinary tract infection)           ED Disposition       ED Disposition   Discharge    Condition   Stable    Date/Time   Mon Jun 17, 2024  4:30 PM    Comment   Mireya Gandhi discharge to home/self care.                   Follow-up Information       Follow up With Specialties Details Why " Contact Info Additional Information    Apolonia Burgos  Schedule an appointment as soon as possible for a visit  Follow up, As needed [address] [place] [address]  [address]  [facility] [address]  [phone]       [facility] Emergency Department Emergency Medicine Go to  If symptoms worsen [address] [facility] [address] [phone] [facility] Emergency Department            Discharge Medication List as of 6/17/2024  4:30 PM        START taking these medications    Details   cephalexin (KEFLEX) 500 mg capsule Take 1 capsule (500 mg total) by mouth every 12 (twelve) hours for 7 days, Starting Mon 6/17/2024, Until Mon 6/24/2024, Normal           CONTINUE these medications which have NOT CHANGED    Details   ALPRAZolam (XANAX) 1 mg tablet Take 1 mg by mouth 4 (four) times a day as needed for anxiety, Historical Med      aluminum-magnesium hydroxide-simethicone (MAALOX MAX) 400-400-40 MG/5ML suspension Take 10 mL by mouth every 6 (six) hours as needed for indigestion or heartburn (nausea), Starting Thu 2/4/2021, Normal      buprenorphine-naloxone (Suboxone) 12-3 MG Place 1 Film (12 mg total) under the tongue 2 (two) times a day, Starting Thu 6/13/2024, Until Mon 8/12/2024, Normal      famotidine (PEPCID) 40 MG tablet Take 1 tablet (40 mg total) by mouth daily at bedtime as needed for heartburn, Starting Fri 5/29/2020, Normal      guaiFENesin (MUCINEX) 600 mg 12 hr tablet Take 1 tablet (600 mg total) by mouth every 12 (twelve) hours, Starting Tue 11/23/2021, Normal      hydrOXYzine HCL (ATARAX) 25 mg tablet Take 2 tablets (50 mg total) by mouth every 6 (six) hours as needed for anxiety, Starting Mon 10/23/2023, Until Wed 11/22/2023 at 2359, Normal      hydrOXYzine pamoate (VISTARIL) 50 mg capsule Take 1 capsule (50 mg total) by mouth 3 (three) times a day, Starting Mon 11/27/2023, Until Fri 1/26/2024, Normal      ibuprofen (MOTRIN) 600 mg tablet Take 1 tablet (600 mg  total) by mouth every 6 (six) hours as needed for mild pain, Starting Tue 2/18/2020, Normal      magnesium oxide (MAG-OX) 400 mg Take 2 tablets (800 mg total) by mouth 2 (two) times a day for 7 days, Starting Fri 6/18/2021, Until Fri 6/25/2021, Normal      methimazole (TAPAZOLE) 5 mg tablet Take 2.5 mg by mouth daily, Starting Thu 7/29/2021, Historical Med      mirtazapine (REMERON SOL-TAB) 30 mg dispersible tablet Take 30 mg by mouth, Starting Thu 8/8/2019, Historical Med      Multiple Vitamin (multivitamin) tablet Take 1 tablet by mouth daily, Historical Med      naloxone (NARCAN) 4 mg/0.1 mL nasal spray Administer 1 spray into a nostril. If no response after 2-3 minutes, give another dose in the other nostril using a new spray., Normal      omeprazole (PriLOSEC) 40 MG capsule Starting Thu 9/2/2021, Historical Med      prazosin (MINIPRESS) 2 mg capsule Take 1 capsule (2 mg total) by mouth daily at bedtime, Starting Mon 11/20/2023, Until Wed 12/20/2023, Normal      !! ziprasidone (GEODON) 80 mg capsule Take 80 mg by mouth daily at bedtime, Starting Thu 8/8/2019, Historical Med      !! ziprasidone (GEODON) 80 mg capsule Take 40 mg by mouth every morning, Starting Thu 8/12/2021, Historical Med       !! - Potential duplicate medications found. Please discuss with provider.          No discharge procedures on file.    PDMP Review         Value Time User    PDMP Reviewed  Yes 6/13/2024  2:43 PM Brigette Davis PA-C            ED Provider  Electronically Signed by             Mar Keene PA-C  06/17/24 1544

## 2024-06-19 ENCOUNTER — TELEPHONE (OUTPATIENT)
Dept: PSYCHIATRY | Facility: CLINIC | Age: 49
End: 2024-06-19

## 2024-06-19 ENCOUNTER — OFFICE VISIT (OUTPATIENT)
Dept: PSYCHIATRY | Facility: CLINIC | Age: 49
End: 2024-06-19

## 2024-06-19 DIAGNOSIS — F19.90 POLYSUBSTANCE USE DISORDER: Primary | ICD-10-CM

## 2024-06-19 PROCEDURE — NC001 PR NO CHARGE

## 2024-06-19 NOTE — PSYCH
Mireya Gandhi  06/19/24  Start Time: 1515  Stop Time: 1605  Total Visit Time: 50 minutes    Visit type: In person    Recovery needs addressed during session: Healthcare Coverage, Basic Needs, and Living & Financial [place]    Notes (DAP Format)  DATA: Patient presented for in-person, scheduled session with this CM.   Patient and this CM were able to create an online [facility] account to obtain the necessary information from patient's state medicaid and SNAP benefits to provide to [facility] as patient is requesting housing assistance as patient transitions to a new place of residency.   Patient and this CM took the time to renew medical benefits while utilizing [facility] account  Patient and this CM made contact with [employer] to request what is needed in order to process patient's [employer] account.    ASSESSMENT: Patient presented well groomed, alert and oriented x4. Patient was talkative throughout session, presented more open to conversation with this CM.   Patient did not present as a concern to this CM, low risk SI/HI    PLAN: Patient plans [facility] new [facility] with Milvia Nguyen.   Patient has requested to re-engage in services with Psychotherapist, to which this CM did schedule.   This CM will fax necessary information [facility].   Patient will call this CM if additional assistance is needed, will schedule when needed.       Referrals made during this visit: [facility] [employer], [facility]    Recovery connections: Psychotherapy, MAT, Case Management     Next appointment: Follow up as needed

## 2024-06-25 ENCOUNTER — SOCIAL WORK (OUTPATIENT)
Dept: PSYCHIATRY | Facility: CLINIC | Age: 49
End: 2024-06-25
Payer: MEDICARE

## 2024-06-25 DIAGNOSIS — F39 MOOD DISORDER (HCC): ICD-10-CM

## 2024-06-25 DIAGNOSIS — F43.10 PTSD (POST-TRAUMATIC STRESS DISORDER): ICD-10-CM

## 2024-06-25 DIAGNOSIS — F11.90 OPIOID USE DISORDER: Primary | ICD-10-CM

## 2024-06-25 PROCEDURE — 90834 PSYTX W PT 45 MINUTES: CPT | Performed by: COUNSELOR

## 2024-06-25 NOTE — PSYCH
Mireya Gandhi  06/13/2024  Start Time: 1105  Stop Time: 1205  Total Visit Time: 60 minutes    Visit type: In person    Recovery needs addressed during session: Other Billing concerns    Notes (DAP Format)  DATA: Patient presented for in-person, scheduled visit with this CM.   Patient's cable and internet was turned off due to lack of payment. Patient made a payment towards pre-paid phone rather than towards cable/internet which ultimately caused bill to be terminated.   Patient and this CM outreached [employer] to see if this amount could be transferred as patient's pre-paid phone is through [employer] as well, but unfortunately the bill was posted. This CM was able to have a promise date posted for patient with an agreeable amount from patient towards total amount due. Patient's cable/internet was ultimately restored.    ASSESSMENT: Patient did present well-groomed. Patient did feel stress due to lack of cable at home as that is patient's only entertainment when at home as patient does not leave the house often due to lack of transportation.   Patient presented low risk SI/HI, no concern to this CM.     PLAN: Patient requested to follow up with this CM in one week to assist with housing.  Patient will continue to meet with assigned treatment team     Referrals made during this visit: No formal referrals made during this session     Recovery connections: Psychotherapy, Medical Toxicology    Next appointment: Follow up , one week

## 2024-06-25 NOTE — PSYCH
Behavioral Health Psychotherapy Progress Note    Psychotherapy Provided: Individual Psychotherapy     1. Opioid use disorder        2. Mood disorder (HCC)        3. PTSD (post-traumatic stress disorder)            Goals addressed in session: Goal 1, Goal 2, and Goal 3      DATA: Mireya was seen for her therapy appointment.  Mireya discussed feeling overwhelmed by her housing situation, stating her landlord is expecting her to leave the apartment in the next 4 days. Mireya discussed how she is managing this stress. Mireya discussed how she continues to search for housing and has plans to place her belongings in storage.  Mireya discussed possibly moving out of the area, stating she would move to [facility] near her father.  Mireya and therapist discussed Mireya's options and therapist discussed with Mireya ways to improve decision making. Mireya and therapist arrived at the decision that Mireya will visit her father and determine if this an appropriate plan. Mireya received an application for [facility] affordable housing list. Mireya agreed to call [facility] and inquire. Mireya was scheduled for a session in 2 weeks.  During this session, this clinician used the following therapeutic modalities: Client-centered Therapy, Cognitive Behavioral Therapy, Motivational Interviewing, and Supportive Psychotherapy    Substance Abuse was addressed during this session. If the client is diagnosed with a co-occurring substance use disorder, please indicate any changes in the frequency or amount of use: Mireya reports continued crack cocaine use, with last use about a week half ago. Therapist utilized stage appropriate interventions to address this substance abuse issue.  Mireya minimized this use and states she guerline that her use is decreasing and this is not a problem for her at this time.  Therapist attempted to highlight the complication of substance abuse on other areas  "of Mireya's life. Mireya continued to minimize the effects of her substance use on her life. Stage of change for addressing substance use diagnoses: Pre-contemplation    ASSESSMENT:  Mireya Gandhi presents with a Euthymic/ normal mood. Mireya was engaged in therapy but continues to struggle to make healthy changes.      her affect is Normal range and intensity, which is congruent, with her mood and the content of the session. The client has made progress on their goals.     Mireya Gandhi presents with a none risk of suicide, none risk of self-harm, and none risk of harm to others.    For any risk assessment that surpasses a \"low\" rating, a safety plan must be developed.    A safety plan was indicated: no  If yes, describe in detail n/a    PLAN: Between sessions, Mireya Gadnhi will continue to apply for rooms for rent and continue to consider where she can find increased emotional support. Mireya will plan a trip to [facility]. At the next session, the therapist will use Client-centered Therapy, Cognitive Behavioral Therapy, Motivational Interviewing, and Supportive Psychotherapy to address Mireya's continued need to maintain a balanced mood and understand how her substance use affects her mental health.    Behavioral Health Treatment Plan and Discharge Planning: Mireya Gandhi is aware of and agrees to continue to work on their treatment plan. They have identified and are working toward their discharge goals. yes    Visit start and stop times:    06/25/24  Start Time: 1457  Stop Time: 1545  Total Visit Time: 48 minutes  "

## 2024-06-27 ENCOUNTER — TELEPHONE (OUTPATIENT)
Dept: OTHER | Age: 49
End: 2024-06-27

## 2024-07-01 ENCOUNTER — TELEPHONE (OUTPATIENT)
Dept: PSYCHIATRY | Facility: CLINIC | Age: 49
End: 2024-07-01

## 2024-07-01 NOTE — TELEPHONE ENCOUNTER
Left voicemail informing patient and/or parent/guardian of the Psych Encounter form needing to be signed as a requirement from the insurance company for billing purposes. Patient can access form via [employer] and sign electronically.     Please make patient aware this form must be signed for each visit as a requirement to continue future visits with provider.

## 2024-07-11 ENCOUNTER — TELEPHONE (OUTPATIENT)
Dept: OTHER | Age: 49
End: 2024-07-11

## 2024-07-11 NOTE — TELEPHONE ENCOUNTER
Mireya called to notify Leti MCKENZIE CM, that the [employer] [facility] closed her case due to not receiving documents. Mireya requests a call back to [phone].    For your review.

## 2024-07-15 ENCOUNTER — TELEPHONE (OUTPATIENT)
Dept: OTHER | Age: 49
End: 2024-07-15

## 2024-07-15 NOTE — TELEPHONE ENCOUNTER
Per Laron, Luh and Laron do believe that Leti sent the requested paperwork to [employer].    Mireya called and informed her of above. Mireya states that she will go to [facility] in person to check.

## 2024-07-15 NOTE — TELEPHONE ENCOUNTER
Mireya called to speak with Leti MCKENZIE CM. Informed Mireya that Leti is GUERO to return tomorrow morning. Mireya states that Leti was to fax paperwork to the [facility] by today, but was unsure if she has sent it or not.    Leti please review.    Luh or Taurus, are you able to review in Leti's absence?    Please advise.

## 2024-07-16 ENCOUNTER — TELEMEDICINE (OUTPATIENT)
Dept: PSYCHIATRY | Facility: CLINIC | Age: 49
End: 2024-07-16
Payer: MEDICARE

## 2024-07-16 DIAGNOSIS — F19.90 POLYSUBSTANCE USE DISORDER: ICD-10-CM

## 2024-07-16 DIAGNOSIS — F39 MOOD DISORDER (HCC): ICD-10-CM

## 2024-07-16 DIAGNOSIS — F11.90 OPIOID USE DISORDER: ICD-10-CM

## 2024-07-16 DIAGNOSIS — F43.10 PTSD (POST-TRAUMATIC STRESS DISORDER): Primary | ICD-10-CM

## 2024-07-16 PROCEDURE — 90834 PSYTX W PT 45 MINUTES: CPT | Performed by: COUNSELOR

## 2024-07-16 NOTE — PSYCH
"Behavioral Health Psychotherapy Progress Note    Psychotherapy Provided: Individual Psychotherapy     1. PTSD (post-traumatic stress disorder)        2. Mood disorder (HCC)        3. Opioid use disorder        4. Polysubstance use disorder            Goals addressed in session: Goal 1, Goal 2, and Goal 3      DATA: Mireya was seen for a telehealth therapy session. Mireya discussed her frustration and stress due to her housing situation. Mireya vented to therapist and attempted to problem solve this situation with therapist.  Mireya reports she was able to gain possible support through [employer].  Mireya reports she has been searching for a new apartment. Mireya states she does not \"feel well\". When pressed Mireya stated \"everything, \"emotionally, physically\". Mireya commented she felt she has been hallucinating.  Mireya reports she does intend on attending her PCP appointment in 2 days and therapist discussed with Mireya contacting her psychiatrist to arrange an appointment. Mireya stated she would consider this option. Mireya reports she has restarted her medications at this time. Therapist highlighted that often times increase in stress can cause increase in symptoms and encouraged Mireya to continue with her medications. Mireya discussed the use of positive affirmations to improve her stress reaction.  Mireya reports she did not investigate moving closer to her family in [place].  Mireya discussed feeling overwhelmed and not completing any tasks. Therapist discussed with Mireya taking on smaller tasks to make situations more manageable. Mireya was scheduled for a session in 2 weeks.  During this session, this clinician used the following therapeutic modalities: Client-centered Therapy, Cognitive Behavioral Therapy, Motivational Interviewing, and Supportive Psychotherapy    Substance Abuse was addressed during this session. If the client is diagnosed with a " "co-occurring substance use disorder, please indicate any changes in the frequency or amount of use: Mireya reports she has been limiting her crack cocaine use due to her finances. Mireya commented she has been unable to smoke marijuana due to her financing situation. Therapist utilized stage appropriate interventions to address this substance abuse issue.  Mireya continues to minimize her use and the effect of her substance use on her mental and physical health. Stage of change for addressing substance use diagnoses: Pre-contemplation    ASSESSMENT:  Mireya Gandhi presents with a Euthymic/ normal mood. Mireya presents as struggling at this time but does not present as a danger to herself or others. Mireya was able to discuss her needs with therapist      her affect is Normal range and intensity, which is congruent, with her mood and the content of the session. The client has made progress on their goals.     Mireya Gandhi presents with a none risk of suicide, none risk of self-harm, and none risk of harm to others.    For any risk assessment that surpasses a \"low\" rating, a safety plan must be developed.    A safety plan was indicated: no  If yes, describe in detail n/a    PLAN: Between sessions, Mireya Gandhi will will continue to search for an apartment and begin focusing on her success. At the next session, the therapist will use Client-centered Therapy, Cognitive Behavioral Therapy, Motivational Interviewing, and Supportive Psychotherapy to address Mireya's difficulty managing her stress levels and understanding how her substance abuse affects her mental health.    Behavioral Health Treatment Plan and Discharge Planning: Mireya Gandhi is aware of and agrees to continue to work on their treatment plan. They have identified and are working toward their discharge goals. yes    Visit start and stop times:    07/16/24  Start Time: 1403  Stop Time: 1446  Total Visit Time: 43 " minutes  Virtual Regular Visit    Verification of patient location:    Patient is located at Home in the following state in which I hold an active license [place]      Assessment/Plan:    Problem List Items Addressed This Visit       Polysubstance use disorder    Mood disorder (HCC)    Opioid use disorder    PTSD (post-traumatic stress disorder) - Primary       Goals addressed in session: Goal 1, Goal 2, and Goal 3           Reason for visit is   Chief Complaint   Patient presents with    Virtual Regular Visit          Encounter provider Taurus Carvajal      Recent Visits  No visits were found meeting these conditions.  Showing recent visits within past 7 days and meeting all other requirements  Today's Visits  Date Type Provider Dept   07/16/24 Telemedicine Taurus Carvajal [facility]   Showing today's visits and meeting all other requirements  Future Appointments  No visits were found meeting these conditions.  Showing future appointments within next 150 days and meeting all other requirements       The patient was identified by name and date of birth. Mireya Gandhi was informed that this is a telemedicine visit and that the visit is being conducted throughthe [employer] platform. She agrees to proceed..  My office door was closed. No one else was in the room.  She acknowledged consent and understanding of privacy and security of the video platform. The patient has agreed to participate and understands they can discontinue the visit at any time.    Patient is aware this is a billable service.     Subjective  Mireya Gandhi is a 49 y.o. female who was seen for a telehealth session .      HPI     Past Medical History:   Diagnosis Date    Cardiomyopathy (HCC)     Elevated transaminase measurement     Irritable bowel syndrome (IBS)     with Constipation    Nutritional deficiency     Overactive thyroid gland     Palpitations     Sweating abnormality     Vaginal infection     Vitamin D insufficiency         Past Surgical History:   Procedure Laterality Date    ANKLE SURGERY Left     FINGER SURGERY      TUBAL LIGATION      UPPER GASTROINTESTINAL ENDOSCOPY         Current Outpatient Medications   Medication Sig Dispense Refill    ALPRAZolam (XANAX) 1 mg tablet Take 1 mg by mouth 4 (four) times a day as needed for anxiety      aluminum-magnesium hydroxide-simethicone (MAALOX MAX) 400-400-40 MG/5ML suspension Take 10 mL by mouth every 6 (six) hours as needed for indigestion or heartburn (nausea) 355 mL 0    buprenorphine-naloxone (Suboxone) 12-3 MG Place 1 Film (12 mg total) under the tongue 2 (two) times a day 60 Film 1    famotidine (PEPCID) 40 MG tablet Take 1 tablet (40 mg total) by mouth daily at bedtime as needed for heartburn 20 tablet 0    guaiFENesin (MUCINEX) 600 mg 12 hr tablet Take 1 tablet (600 mg total) by mouth every 12 (twelve) hours 14 tablet 0    hydrOXYzine HCL (ATARAX) 25 mg tablet Take 2 tablets (50 mg total) by mouth every 6 (six) hours as needed for anxiety 120 tablet 0    hydrOXYzine pamoate (VISTARIL) 50 mg capsule Take 1 capsule (50 mg total) by mouth 3 (three) times a day 90 capsule 1    ibuprofen (MOTRIN) 600 mg tablet Take 1 tablet (600 mg total) by mouth every 6 (six) hours as needed for mild pain 40 tablet 0    magnesium oxide (MAG-OX) 400 mg Take 2 tablets (800 mg total) by mouth 2 (two) times a day for 7 days 28 tablet 0    methimazole (TAPAZOLE) 5 mg tablet Take 2.5 mg by mouth daily      mirtazapine (REMERON SOL-TAB) 30 mg dispersible tablet Take 30 mg by mouth (Patient not taking: Reported on 8/18/2022)      Multiple Vitamin (multivitamin) tablet Take 1 tablet by mouth daily (Patient not taking: Reported on 6/13/2024)      naloxone (NARCAN) 4 mg/0.1 mL nasal spray Administer 1 spray into a nostril. If no response after 2-3 minutes, give another dose in the other nostril using a new spray. 1 each 5    omeprazole (PriLOSEC) 40 MG capsule       prazosin (MINIPRESS) 2 mg capsule Take 1  capsule (2 mg total) by mouth daily at bedtime 30 capsule 0    ziprasidone (GEODON) 80 mg capsule Take 80 mg by mouth daily at bedtime      ziprasidone (GEODON) 80 mg capsule Take 40 mg by mouth every morning (Patient not taking: Reported on 6/13/2024)       No current facility-administered medications for this visit.        Allergies   Allergen Reactions    Gabapentin Anaphylaxis    Hydrocodone Hives    Adhesive [Medical Tape] Rash       Review of Systems    Video Exam    There were no vitals filed for this visit.    Physical Exam     Visit Time    Visit Start Time: 1403  Visit Stop Time: 1446  Total Visit Duration:  43 minutes

## 2024-07-19 ENCOUNTER — TELEPHONE (OUTPATIENT)
Dept: PSYCHIATRY | Facility: CLINIC | Age: 49
End: 2024-07-19

## 2024-07-25 ENCOUNTER — OFFICE VISIT (OUTPATIENT)
Dept: PSYCHIATRY | Facility: CLINIC | Age: 49
End: 2024-07-25

## 2024-07-25 DIAGNOSIS — F19.90 POLYSUBSTANCE USE DISORDER: Primary | ICD-10-CM

## 2024-07-25 NOTE — PSYCH
Patient was scheduled for in-person, scheduled session with this CM in order to complete [employer] application.   Patient did not present to the office and will be recorded as a 'no show.'

## 2024-07-30 ENCOUNTER — TELEMEDICINE (OUTPATIENT)
Dept: PSYCHIATRY | Facility: CLINIC | Age: 49
End: 2024-07-30
Payer: MEDICARE

## 2024-07-30 DIAGNOSIS — F39 MOOD DISORDER (HCC): ICD-10-CM

## 2024-07-30 DIAGNOSIS — F11.90 OPIOID USE DISORDER: Primary | ICD-10-CM

## 2024-07-30 DIAGNOSIS — F19.90 POLYSUBSTANCE USE DISORDER: ICD-10-CM

## 2024-07-30 DIAGNOSIS — F43.10 PTSD (POST-TRAUMATIC STRESS DISORDER): ICD-10-CM

## 2024-07-30 PROCEDURE — 90834 PSYTX W PT 45 MINUTES: CPT | Performed by: COUNSELOR

## 2024-07-30 NOTE — PSYCH
"Behavioral Health Psychotherapy Progress Note    Psychotherapy Provided: Individual Psychotherapy     1. Opioid use disorder        2. Mood disorder (HCC)        3. PTSD (post-traumatic stress disorder)        4. Polysubstance use disorder            Goals addressed in session: Goal 1, Goal 2, and Goal 3      DATA: Mireya was seen for a telehealth session. Mireya reviewed her recent hospital visit. Mireya struggled to provide an effective explanation of her recent hospital stay.  Mireya denied any increase in psychiatric symptoms.  Mireya presented as agitated and described multiple situations in her life that are causing her frustration. Mireya struggled to accept any feedback. Mireya reports she had attended court and will be need to be out of the home by 8/31/2024. Therapist worked with Mireya to develop a focused plan to address her housing needs. Mireya was asked to complete a \"to do\" list and make this list manageable. Therapist highlighted SMART goal planning techniques and Mireya was provided with a few phone numbers for possible apartment/rooms to rent. Mireya was reminded to sign her encounter forms on [employer].  Mireya was scheduled for a follow up in 1 week.  During this session, this clinician used the following therapeutic modalities: Client-centered Therapy, Cognitive Behavioral Therapy, Motivational Interviewing, and Supportive Psychotherapy    Substance Abuse was addressed during this session. If the client is diagnosed with a co-occurring substance use disorder, please indicate any changes in the frequency or amount of use: Mireya denied any substance use at this time, however, during her hospital stay 7/19 records indicate she had a positive uds for cocaine.  Therapist utilized stage appropriate interventions to address this substance abuse issue.   Mireya struggled to discuss her substance use and states this is not a problem at this time.  Stage of change for " "addressing substance use diagnoses: Pre-contemplation    ASSESSMENT:  Mireya Gandhi presents with a Angry and Anxious mood. Mireya presents with increase in struggles due to increase in stress. Mireya was agreeable to making her stress more manageable.      her affect is Normal range and intensity, which is congruent, with her mood and the content of the session. The client has made progress on their goals.     Mireya Gandhi presents with a none risk of suicide, none risk of self-harm, and none risk of harm to others.    For any risk assessment that surpasses a \"low\" rating, a safety plan must be developed.    A safety plan was indicated: no  If yes, describe in detail n/a    PLAN: Between sessions, Mireya Gandhi will make a \"to do\" list and call possible housing/room rentals. At the next session, the therapist will use Client-centered Therapy, Cognitive Behavioral Therapy, Motivational Interviewing, and Supportive Psychotherapy to address Mireya's struggle with managing her stress levels and following up regarding any substance abuse.    Behavioral Health Treatment Plan and Discharge Planning: Mireya Gandhi is aware of and agrees to continue to work on their treatment plan. They have identified and are working toward their discharge goals. yes    Visit start and stop times:    07/30/24  Start Time: 1409  Stop Time: 1448  Total Visit Time: 39 minutes  Virtual Regular Visit    Verification of patient location:    Patient is located at Home in the following state in which I hold an active license [place]      Assessment/Plan:    Problem List Items Addressed This Visit       Polysubstance use disorder    Mood disorder (HCC)    Opioid use disorder - Primary    PTSD (post-traumatic stress disorder)       Goals addressed in session: Goal 1, Goal 2, and Goal 3           Reason for visit is   Chief Complaint   Patient presents with    Virtual Regular Visit          Encounter provider Taurus" Wei      Recent Visits  No visits were found meeting these conditions.  Showing recent visits within past 7 days and meeting all other requirements  Today's Visits  Date Type Provider Dept   07/30/24 Telemedicine Taurus Carvajal [facility]   Showing today's visits and meeting all other requirements  Future Appointments  No visits were found meeting these conditions.  Showing future appointments within next 150 days and meeting all other requirements       The patient was identified by name and date of birth. Mireya Gandhi was informed that this is a telemedicine visit and that the visit is being conducted throughthe [employer] platform. She agrees to proceed..  My office door was closed. No one else was in the room.  She acknowledged consent and understanding of privacy and security of the video platform. The patient has agreed to participate and understands they can discontinue the visit at any time.    Patient is aware this is a billable service.     Subjective  Mireya Gandhi is a 49 y.o. female was seen for therapy virtually .      HPI     Past Medical History:   Diagnosis Date    Cardiomyopathy (HCC)     Elevated transaminase measurement     Irritable bowel syndrome (IBS)     with Constipation    Nutritional deficiency     Overactive thyroid gland     Palpitations     Sweating abnormality     Vaginal infection     Vitamin D insufficiency        Past Surgical History:   Procedure Laterality Date    ANKLE SURGERY Left     FINGER SURGERY      TUBAL LIGATION      UPPER GASTROINTESTINAL ENDOSCOPY         Current Outpatient Medications   Medication Sig Dispense Refill    ALPRAZolam (XANAX) 1 mg tablet Take 1 mg by mouth 4 (four) times a day as needed for anxiety      aluminum-magnesium hydroxide-simethicone (MAALOX MAX) 400-400-40 MG/5ML suspension Take 10 mL by mouth every 6 (six) hours as needed for indigestion or heartburn (nausea) 355 mL 0    buprenorphine-naloxone (Suboxone) 12-3 MG  Place 1 Film (12 mg total) under the tongue 2 (two) times a day 60 Film 1    famotidine (PEPCID) 40 MG tablet Take 1 tablet (40 mg total) by mouth daily at bedtime as needed for heartburn 20 tablet 0    guaiFENesin (MUCINEX) 600 mg 12 hr tablet Take 1 tablet (600 mg total) by mouth every 12 (twelve) hours 14 tablet 0    hydrOXYzine HCL (ATARAX) 25 mg tablet Take 2 tablets (50 mg total) by mouth every 6 (six) hours as needed for anxiety 120 tablet 0    hydrOXYzine pamoate (VISTARIL) 50 mg capsule Take 1 capsule (50 mg total) by mouth 3 (three) times a day 90 capsule 1    ibuprofen (MOTRIN) 600 mg tablet Take 1 tablet (600 mg total) by mouth every 6 (six) hours as needed for mild pain 40 tablet 0    magnesium oxide (MAG-OX) 400 mg Take 2 tablets (800 mg total) by mouth 2 (two) times a day for 7 days 28 tablet 0    methimazole (TAPAZOLE) 5 mg tablet Take 2.5 mg by mouth daily      mirtazapine (REMERON SOL-TAB) 30 mg dispersible tablet Take 30 mg by mouth (Patient not taking: Reported on 8/18/2022)      Multiple Vitamin (multivitamin) tablet Take 1 tablet by mouth daily (Patient not taking: Reported on 6/13/2024)      naloxone (NARCAN) 4 mg/0.1 mL nasal spray Administer 1 spray into a nostril. If no response after 2-3 minutes, give another dose in the other nostril using a new spray. 1 each 5    omeprazole (PriLOSEC) 40 MG capsule       prazosin (MINIPRESS) 2 mg capsule Take 1 capsule (2 mg total) by mouth daily at bedtime 30 capsule 0    ziprasidone (GEODON) 80 mg capsule Take 80 mg by mouth daily at bedtime      ziprasidone (GEODON) 80 mg capsule Take 40 mg by mouth every morning (Patient not taking: Reported on 6/13/2024)       No current facility-administered medications for this visit.        Allergies   Allergen Reactions    Gabapentin Anaphylaxis    Hydrocodone Hives    Adhesive [Medical Tape] Rash       Review of Systems    Video Exam    There were no vitals filed for this visit.    Physical Exam     Visit  Time    Visit Start Time: 1409  Visit Stop Time: 1448  Total Visit Duration:  39 minutes

## 2024-08-14 ENCOUNTER — TELEPHONE (OUTPATIENT)
Dept: OTHER | Age: 49
End: 2024-08-14

## 2024-08-14 NOTE — TELEPHONE ENCOUNTER
Mireya left a VM on the MAT line asking when is her next appt. Called back Mireya and informed her of her upcoming appt with Brigette BACON, stephen. MAT office number provided.

## 2024-08-22 ENCOUNTER — OFFICE VISIT (OUTPATIENT)
Dept: OTHER | Age: 49
End: 2024-08-22
Payer: MEDICARE

## 2024-08-22 VITALS
DIASTOLIC BLOOD PRESSURE: 62 MMHG | WEIGHT: 155 LBS | HEIGHT: 69 IN | BODY MASS INDEX: 22.96 KG/M2 | SYSTOLIC BLOOD PRESSURE: 140 MMHG | HEART RATE: 76 BPM

## 2024-08-22 DIAGNOSIS — F39 MOOD DISORDER (HCC): ICD-10-CM

## 2024-08-22 DIAGNOSIS — F11.90 OPIOID USE DISORDER: Primary | ICD-10-CM

## 2024-08-22 PROCEDURE — 99213 OFFICE O/P EST LOW 20 MIN: CPT

## 2024-08-22 RX ORDER — BUPRENORPHINE AND NALOXONE 12; 3 MG/1; MG/1
12 FILM, SOLUBLE BUCCAL; SUBLINGUAL 2 TIMES DAILY
Qty: 60 FILM | Refills: 1 | Status: SHIPPED | OUTPATIENT
Start: 2024-08-22 | End: 2024-10-30 | Stop reason: SDUPTHER

## 2024-08-22 NOTE — ASSESSMENT & PLAN NOTE
Reports that her psychiatric medications were changed due side effects of bradycardia. Started on Latuda and Geodon and Prazosin were d/c

## 2024-08-22 NOTE — PROGRESS NOTES
[facility]    Progress Note  Mireya Gandhi 49 y.o. female MRN: [id number]  @ Encounter: [id number]      1. Opioid use disorder  Assessment & Plan:  Patient reports she is doing well from a MOUD standpoint. Continues to take Suboxone 12 mg BID   PDMP reviewed  Continue current medication   F/U in 2 months   Orders:  -     buprenorphine-naloxone (Suboxone) 12-3 MG; Place 1 Film (12 mg total) under the tongue 2 (two) times a day  2. Mood disorder (HCC)  Assessment & Plan:  Reports that her psychiatric medications were changed due side effects of bradycardia. Started on Latuda and Geodon and Prazosin were d/c         Support Services  Case Management and Certified  are following.   Patient was referred to the [facility] Certified Addiction Counselors: No  The patient is actively engaged with the [facility] Certified Addiction Counselor’s psychotherapy plan: No    buprenorphine-naloxone      PDMP reviewed:     PDMP Review         Value Time User    PDMP Reviewed  Yes 8/22/2024  2:30 PM Brigette Davis PA-C            SUBJECTIVE  Patient reports no further cravings; doing well on current dose and would like to continue    Drug cravings: none   Motivation to continue treatment: high       Current Outpatient Medications:     buprenorphine-naloxone (Suboxone) 12-3 MG, Place 1 Film (12 mg total) under the tongue 2 (two) times a day, Disp: 60 Film, Rfl: 1    ALPRAZolam (XANAX) 1 mg tablet, Take 1 mg by mouth 4 (four) times a day as needed for anxiety, Disp: , Rfl:     aluminum-magnesium hydroxide-simethicone (MAALOX MAX) 400-400-40 MG/5ML suspension, Take 10 mL by mouth every 6 (six) hours as needed for indigestion or heartburn (nausea), Disp: 355 mL, Rfl: 0    famotidine (PEPCID) 40 MG tablet, Take 1 tablet (40 mg total) by mouth daily at bedtime as needed for heartburn, Disp: 20 tablet, Rfl: 0    guaiFENesin (MUCINEX) 600 mg 12 hr tablet, Take 1 tablet (600 mg total) by mouth every 12  (twelve) hours, Disp: 14 tablet, Rfl: 0    hydrOXYzine HCL (ATARAX) 25 mg tablet, Take 2 tablets (50 mg total) by mouth every 6 (six) hours as needed for anxiety, Disp: 120 tablet, Rfl: 0    hydrOXYzine pamoate (VISTARIL) 50 mg capsule, Take 1 capsule (50 mg total) by mouth 3 (three) times a day, Disp: 90 capsule, Rfl: 1    ibuprofen (MOTRIN) 600 mg tablet, Take 1 tablet (600 mg total) by mouth every 6 (six) hours as needed for mild pain, Disp: 40 tablet, Rfl: 0    magnesium oxide (MAG-OX) 400 mg, Take 2 tablets (800 mg total) by mouth 2 (two) times a day for 7 days, Disp: 28 tablet, Rfl: 0    methimazole (TAPAZOLE) 5 mg tablet, Take 2.5 mg by mouth daily, Disp: , Rfl:     mirtazapine (REMERON SOL-TAB) 30 mg dispersible tablet, Take 30 mg by mouth (Patient not taking: Reported on 8/18/2022), Disp: , Rfl:     Multiple Vitamin (multivitamin) tablet, Take 1 tablet by mouth daily (Patient not taking: Reported on 6/13/2024), Disp: , Rfl:     naloxone (NARCAN) 4 mg/0.1 mL nasal spray, Administer 1 spray into a nostril. If no response after 2-3 minutes, give another dose in the other nostril using a new spray., Disp: 1 each, Rfl: 5    omeprazole (PriLOSEC) 40 MG capsule, , Disp: , Rfl:     prazosin (MINIPRESS) 2 mg capsule, Take 1 capsule (2 mg total) by mouth daily at bedtime, Disp: 30 capsule, Rfl: 0    ziprasidone (GEODON) 80 mg capsule, Take 80 mg by mouth daily at bedtime, Disp: , Rfl:     ziprasidone (GEODON) 80 mg capsule, Take 40 mg by mouth every morning (Patient not taking: Reported on 6/13/2024), Disp: , Rfl:     Objective     Vitals:    08/22/24 1359   BP: 140/62   Pulse: 76       Physical Exam  Constitutional:       General: She is not in acute distress.     Appearance: She is not diaphoretic.   Eyes:      General: No scleral icterus.  Pulmonary:      Effort: No respiratory distress.      Breath sounds: Normal breath sounds.   Abdominal:      General: There is no distension.      Palpations: Abdomen is soft.    Neurological:      Mental Status: She is alert and oriented to person, place, and time.              Lab Results:   Results from last 6 Months   Lab Units 06/10/24  1816   WBC Thousand/uL 8.32   HEMOGLOBIN g/dL 12.8   HEMATOCRIT % 39.4   PLATELETS Thousands/uL 261      Results from last 6 Months   Lab Units 07/23/24  0605 07/20/24  0450 07/19/24  1147   POTASSIUM mmol/L 4.9   < > 4.0   CHLORIDE mmol/L 105   < > 104   CO2 mmol/L 26   < > 25   BUN mg/dL 18   < > 14   CREATININE mg/dL 1.02   < > 0.87   CALCIUM mg/dL 8.9   < > 8.8   ALBUMIN g/dL  --   --  4.3   ALK PHOS U/L  --   --  104   ALT U/L  --   --  17   AST U/L  --   --  25    < > = values in this interval not displayed.                         Counseling / Coordination of Care  Total time spent today 20 minutes. Greater than 50% of total time was spent with the patient and / or family counseling and / or coordination of care.     Brigette Davis PA-C

## 2024-08-22 NOTE — ASSESSMENT & PLAN NOTE
Patient reports she is doing well from a MOUD standpoint. Continues to take Suboxone 12 mg BID   PDMP reviewed  Continue current medication   F/U in 2 months

## 2024-08-25 ENCOUNTER — HOSPITAL ENCOUNTER (EMERGENCY)
Facility: HOSPITAL | Age: 49
Discharge: HOME | End: 2024-08-25
Attending: EMERGENCY MEDICINE | Admitting: EMERGENCY MEDICINE
Payer: MEDICARE

## 2024-08-25 ENCOUNTER — APPOINTMENT (EMERGENCY)
Dept: RADIOLOGY | Facility: HOSPITAL | Age: 49
End: 2024-08-25
Payer: MEDICARE

## 2024-08-25 VITALS
SYSTOLIC BLOOD PRESSURE: 144 MMHG | DIASTOLIC BLOOD PRESSURE: 80 MMHG | HEART RATE: 63 BPM | TEMPERATURE: 98.6 F | RESPIRATION RATE: 18 BRPM | OXYGEN SATURATION: 96 %

## 2024-08-25 DIAGNOSIS — Z72.0 TOBACCO ABUSE: ICD-10-CM

## 2024-08-25 DIAGNOSIS — J40 BRONCHITIS: Primary | ICD-10-CM

## 2024-08-25 LAB
ATRIAL RATE: 52 BPM
FLUAV RNA RESP QL NAA+PROBE: NEGATIVE
FLUBV RNA RESP QL NAA+PROBE: NEGATIVE
P AXIS: 63 DEGREES
PR INTERVAL: 146 MS
QRS AXIS: 46 DEGREES
QRSD INTERVAL: 94 MS
QT INTERVAL: 460 MS
QTC INTERVAL: 427 MS
RSV RNA RESP QL NAA+PROBE: NEGATIVE
SARS-COV-2 RNA RESP QL NAA+PROBE: NEGATIVE
T WAVE AXIS: 60 DEGREES
VENTRICULAR RATE: 52 BPM

## 2024-08-25 PROCEDURE — 99285 EMERGENCY DEPT VISIT HI MDM: CPT

## 2024-08-25 PROCEDURE — 99285 EMERGENCY DEPT VISIT HI MDM: CPT | Performed by: EMERGENCY MEDICINE

## 2024-08-25 PROCEDURE — 0241U HB NFCT DS VIR RESP RNA 4 TRGT: CPT | Performed by: EMERGENCY MEDICINE

## 2024-08-25 PROCEDURE — 93010 ELECTROCARDIOGRAM REPORT: CPT | Performed by: STUDENT IN AN ORGANIZED HEALTH CARE EDUCATION/TRAINING PROGRAM

## 2024-08-25 PROCEDURE — 93005 ELECTROCARDIOGRAM TRACING: CPT

## 2024-08-25 PROCEDURE — 71045 X-RAY EXAM CHEST 1 VIEW: CPT

## 2024-08-25 RX ORDER — DESVENLAFAXINE 100 MG/1
100 TABLET, EXTENDED RELEASE ORAL DAILY
COMMUNITY
Start: 2024-08-20

## 2024-08-25 RX ORDER — DICYCLOMINE HCL 20 MG
20 TABLET ORAL EVERY 6 HOURS
COMMUNITY
Start: 2024-08-06

## 2024-08-25 RX ORDER — LANOLIN ALCOHOL/MO/W.PET/CERES
1000 CREAM (GRAM) TOPICAL DAILY
COMMUNITY
Start: 2024-08-20 | End: 2025-08-20

## 2024-08-25 RX ORDER — BUSPIRONE HYDROCHLORIDE 30 MG/1
30 TABLET ORAL 2 TIMES DAILY
COMMUNITY

## 2024-08-25 RX ORDER — AZITHROMYCIN 250 MG/1
TABLET, FILM COATED ORAL
Qty: 6 TABLET | Refills: 0 | Status: SHIPPED | OUTPATIENT
Start: 2024-08-25 | End: 2024-08-29

## 2024-08-25 RX ORDER — LOSARTAN POTASSIUM 25 MG/1
25 TABLET ORAL DAILY
COMMUNITY
Start: 2024-07-23

## 2024-08-25 RX ORDER — ESOMEPRAZOLE MAGNESIUM 40 MG/1
40 CAPSULE, DELAYED RELEASE ORAL
COMMUNITY
Start: 2024-08-03

## 2024-08-25 RX ORDER — PHENYLEPHRINE HCL 10 MG
TABLET ORAL
COMMUNITY
Start: 2024-07-23

## 2024-08-25 RX ORDER — LURASIDONE HYDROCHLORIDE 40 MG/1
40 TABLET, FILM COATED ORAL
COMMUNITY

## 2024-08-25 RX ORDER — ESTRADIOL/NORETHINDRONE ACETATE TRANSDERMAL SYSTEM .05; .14 MG/D; MG/D
1 PATCH, EXTENDED RELEASE TRANSDERMAL 2 TIMES WEEKLY
COMMUNITY

## 2024-08-25 RX ORDER — BUDESONIDE AND FORMOTEROL FUMARATE DIHYDRATE 80; 4.5 UG/1; UG/1
2 AEROSOL RESPIRATORY (INHALATION) 2 TIMES DAILY
COMMUNITY
Start: 2024-05-31

## 2024-08-25 NOTE — ED PROVIDER NOTES
History  Chief Complaint   Patient presents with    Shortness of Breath     Pt arrives with EMS after she woke up this morning coughing with phlegm. Pt then developed SOB with brief subjective CP that has since resolved. No meds pta.      HPI      49-year-old female arrives via EMS secondary to waking up this morning she had a cough with a brief episode of nonradiating substernal chest pain without associated nausea or diaphoresis, the patient's chest pain spontaneously resolved, no medications given prior to arrival by EMS.  Patient lives alone and she became concerned because she was coughing up phlegm.  Patient denies any recent travel to the geographical area, fever, chills.  Denies any abdominal pain, diaphoresis or presyncopal symptoms.  No history of melena, hematochezia or hemoptysis.    Remaining 12 point review of systems is unremarkable.  Prior to Admission Medications   Prescriptions Last Dose Informant Patient Reported? Taking?   CombiPatch 0.05-0.14 MG/DAY   Yes No   Sig: Place 1 patch on the skin 2 (two) times a week   Hydrocortisone/Aloe Max Str 1 % cream   Yes Yes   Latuda 40 MG tablet   Yes No   Sig: Take 40 mg by mouth daily with dinner   budesonide-formoterol (SYMBICORT) 80-4.5 MCG/ACT inhaler   Yes Yes   Sig: Inhale 2 puffs 2 (two) times a day   buprenorphine-naloxone (Suboxone) 12-3 MG   No No   Sig: Place 1 Film (12 mg total) under the tongue 2 (two) times a day   busPIRone (BUSPAR) 30 MG tablet   Yes No   Sig: Take 30 mg by mouth 2 (two) times a day   desvenlafaxine (PRISTIQ) 100 mg 24 hr tablet   Yes Yes   Sig: Take 100 mg by mouth daily   dicyclomine (BENTYL) 20 mg tablet   Yes Yes   Sig: Take 20 mg by mouth every 6 (six) hours   esomeprazole (NexIUM) 40 MG capsule   Yes Yes   Sig: Take 40 mg by mouth   hydrOXYzine pamoate (VISTARIL) 50 mg capsule   No No   Sig: Take 1 capsule (50 mg total) by mouth 3 (three) times a day   losartan (COZAAR) 25 mg tablet   Yes Yes   Sig: Take 25 mg by mouth  daily   prazosin (MINIPRESS) 2 mg capsule   No No   Sig: Take 1 capsule (2 mg total) by mouth daily at bedtime   vitamin B-12 (VITAMIN B-12) 1,000 mcg tablet   Yes Yes   Sig: Take 1,000 mcg by mouth daily      Facility-Administered Medications: None       Past Medical History:   Diagnosis Date    Cardiomyopathy (HCC)     Elevated transaminase measurement     Irritable bowel syndrome (IBS)     with Constipation    Nutritional deficiency     Overactive thyroid gland     Palpitations     Sweating abnormality     Vaginal infection     Vitamin D insufficiency        Past Surgical History:   Procedure Laterality Date    ANKLE SURGERY Left     FINGER SURGERY      TUBAL LIGATION      UPPER GASTROINTESTINAL ENDOSCOPY         Family History   Problem Relation Age of Onset    No Known Problems Mother     Diabetes Father     Cancer Father      I have reviewed and agree with the history as documented.    E-Cigarette/Vaping    E-Cigarette Use Never User      E-Cigarette/Vaping Substances     Social History     Tobacco Use    Smoking status: Every Day     Current packs/day: 0.50     Average packs/day: 0.5 packs/day for 30.0 years (15.0 ttl pk-yrs)     Types: Cigarettes    Smokeless tobacco: Never   Vaping Use    Vaping status: Never Used   Substance Use Topics    Alcohol use: Not Currently     Comment: occasionally    Drug use: Yes     Types: Cocaine, Marijuana       Review of Systems   Constitutional: Negative.  Negative for chills, diaphoresis, fatigue and fever.   HENT: Negative.     Eyes: Negative.    Respiratory:  Positive for cough. Negative for chest tightness, shortness of breath and stridor.    Cardiovascular: Negative.  Negative for chest pain, palpitations and leg swelling.   Gastrointestinal: Negative.  Negative for abdominal pain.   Endocrine: Negative.    Genitourinary: Negative.    Musculoskeletal: Negative.    Allergic/Immunologic: Negative.    Neurological: Negative.    Hematological: Negative.     Psychiatric/Behavioral: Negative.         Physical Exam  Physical Exam  Vitals and nursing note reviewed.   Constitutional:       General: She is not in acute distress.     Appearance: She is well-developed and normal weight. She is not ill-appearing, toxic-appearing or diaphoretic.   HENT:      Head: Normocephalic and atraumatic.      Mouth/Throat:      Mouth: Mucous membranes are moist.      Pharynx: Oropharynx is clear.   Eyes:      Extraocular Movements: Extraocular movements intact.      Pupils: Pupils are equal, round, and reactive to light.   Cardiovascular:      Rate and Rhythm: Normal rate and regular rhythm.   Pulmonary:      Effort: Pulmonary effort is normal.      Breath sounds: Examination of the right-lower field reveals rhonchi. Examination of the left-lower field reveals rhonchi. Rhonchi present.   Musculoskeletal:         General: Normal range of motion.      Cervical back: Normal range of motion and neck supple.      Right lower leg: No tenderness. No edema.      Left lower leg: No tenderness. No edema.   Skin:     General: Skin is warm.      Capillary Refill: Capillary refill takes less than 2 seconds.   Neurological:      General: No focal deficit present.      Mental Status: She is alert and oriented to person, place, and time.   Psychiatric:         Mood and Affect: Mood normal.         Behavior: Behavior normal.         Vital Signs  ED Triage Vitals [08/25/24 0852]   Temperature Pulse Respirations Blood Pressure SpO2   98.6 °F (37 °C) 63 18 144/80 96 %      Temp Source Heart Rate Source Patient Position - Orthostatic VS BP Location FiO2 (%)   Oral Monitor Lying Left arm --      Pain Score       --           Vitals:    08/25/24 0852   BP: 144/80   Pulse: 63   Patient Position - Orthostatic VS: Lying         Visual Acuity      ED Medications  Medications - No data to display    Diagnostic Studies  Results Reviewed       Procedure Component Value Units Date/Time    FLU/RSV/COVID - if FLU/RSV  clinically relevant [[id number]]  (Normal) Collected: 08/25/24 0901    Lab Status: Final result Specimen: Nares from Nose Updated: 08/25/24 0944     SARS-CoV-2 Negative     INFLUENZA A PCR Negative     INFLUENZA B PCR Negative     RSV PCR Negative    Narrative:      This test has been performed using the CoV-2/Flu/RSV plus assay on the [employer] GeneXpert platform. This test has been validated by the  and verified by the performing laboratory.     This test is designed to amplify and detect the following: nucleocapsid (N), envelope (E), and RNA-dependent RNA polymerase (RdRP) genes of the SARS-CoV-2 genome; matrix (M), basic polymerase (PB2), and acidic protein (PA) segments of the influenza A genome; matrix (M) and non-structural protein (NS) segments of the influenza B genome, and the nucleocapsid genes of RSV A and RSV B.     Positive results are indicative of the presence of Flu A, Flu B, RSV, and/or SARS-CoV-2 RNA. Positive results for SARS-CoV-2 or suspected novel influenza should be reported to state, local, or federal health departments according to local reporting requirements.      All results should be assessed in conjunction with clinical presentation and other laboratory markers for clinical management.     FOR PEDIATRIC PATIENTS - copy/paste COVID Guidelines URL to browser: [email or web address]                      XR chest 1 view portable   ED Interpretation by Geoffrey Gannon III, DO (08/25 0919)   Stat portable chest x-ray shows no acute fractures, dislocation, appears the patient has inflated lungs with a history of tobacco abuse raises possibility of COPD underlying disease.  No occult pneumothoraces.  No Pleural effusions noted.                 Procedures  ECG 12 Lead Documentation Only    Date/Time: 8/25/2024 9:03 AM    Performed by: Geoffrey Gannon III, DO  Authorized by: Geoffrey Gannon III, DO    Indications /  Diagnosis:  Cough, congestion, chest pain  ECG reviewed by me, the ED Provider: yes    Patient location:  ED  Comments:      I personally reviewed this EKG this performed and the patient August 25, 2024, EKG was completed at 9:02 AM and inter by me at 9:03 AM, sinus bradycardia with a ventricular rate of 52 bpm, remaining portion of all is within normal limits.    No diffuse elevations to indicate pericarditis.  No coved ST elevations greater than 2mm with negative T waves in V1-3 to indicate concern for brugada.  No biphasic T waves in V2, V3 to indicate Wellens (critical stenosis of LAD).   No elevation in aVR or deviation when compared to V1 (can be associated with ST depression in I,II, V4-6 when left main occlusion is present).            ED Course  ED Course as of 08/25/24 0951   Sun Aug 25, 2024   0900 Patient seen and evaluated, orders placed.  Cough, congestion without fever, onset today morning.    Brief focused differential dx in this patient is as follows: Bronchitis versus RSV versus flu versus COVID.   0949 Viral testing negative, chest x-ray unremarkable, due to her smoking history we will proceed with trial of Zithromax for atypical organisms, O2 saturation 96 to 98%, patient is stable for discharge.                                 SBIRT 22yo+      Flowsheet Row Most Recent Value   Initial Alcohol Screen: US AUDIT-C     1. How often do you have a drink containing alcohol? 0 Filed at: 08/25/2024 0854   2. How many drinks containing alcohol do you have on a typical day you are drinking?  0 Filed at: 08/25/2024 0854   3b. FEMALE Any Age, or MALE 65+: How often do you have 4 or more drinks on one occassion? 0 Filed at: 08/25/2024 0854   Audit-C Score 0 Filed at: 08/25/2024 0854   PRABHJOT: How many times in the past year have you...    Used an illegal drug or used a prescription medication for non-medical reasons? Never Filed at: 08/25/2024 0854                      Medical Decision  "Making  Nontoxic-appearing, cough, scattered rhonchi throughout the lung fields, O2 saturation is 96 to 98%, stable for discharge to home.    Portions of the record may have been created with voice recognition software. Occasional wrong word or \"sound a like\" substitutions may have occurred due to the inherent limitations of voice recognition software. Read the chart carefully and recognize, using context, where substitutions have occurred.       Counseling: I had a detailed discussion with the patient and/or guardian regarding: the historical points, exam findings, and any diagnostic results supporting the discharge diagnosis, lab results, radiology results, discharge instructions reviewed with patient and/or family/caregiver and understanding was verbalized. Instructions given to return to the emergency department if symptoms worsen or persist, or if there are any questions or concerns that arise at home.        Amount and/or Complexity of Data Reviewed  Radiology: ordered and independent interpretation performed.                 Disposition  Final diagnoses:   Bronchitis   Tobacco abuse     Time reflects when diagnosis was documented in both MDM as applicable and the Disposition within this note       Time User Action Codes Description Comment    8/25/2024  9:45 AM Geoffrey Gannon Add [J40] Bronchitis     8/25/2024  9:45 AM Geoffrey Gannon Add [Z72.0] Tobacco abuse           ED Disposition       ED Disposition   Discharge    Condition   Stable    Date/Time   Sun Aug 25, 2024 0943    Comment   Mireya Gandhi discharge to home/self care.                   Follow-up Information       Follow up With Specialties Details Why Contact Info    Apolonia Burgos    [facility] [address]  [facility] [id number] [phone]              Patient's Medications   Discharge Prescriptions    AZITHROMYCIN (ZITHROMAX Z-HANSA) 250 MG TABLET    Take 2 tablets today then 1 tablet daily x 4 days       Start Date: 8/25/2024 End " Date: 8/29/2024       Order Dose: --       Quantity: 6 tablet    Refills: 0       No discharge procedures on file.    PDMP Review         Value Time User    PDMP Reviewed  Yes 8/22/2024  2:30 PM Brigette Davis PA-C            ED Provider  Electronically Signed by             Geoffrey Gannon III,   08/25/24 0951

## 2024-09-11 ENCOUNTER — TELEPHONE (OUTPATIENT)
Age: 49
End: 2024-09-11

## 2024-09-11 NOTE — TELEPHONE ENCOUNTER
Pt called in regards not having her paperwork, not having the office phone number, and requesting to speak with Leti or Barney.  Writer confirmed pt needs to be transferred to [facility] Office.

## 2024-09-12 ENCOUNTER — HOSPITAL ENCOUNTER (EMERGENCY)
Facility: HOSPITAL | Age: 49
Discharge: HOME | End: 2024-09-12
Attending: EMERGENCY MEDICINE
Payer: MEDICARE

## 2024-09-12 VITALS
SYSTOLIC BLOOD PRESSURE: 120 MMHG | OXYGEN SATURATION: 97 % | BODY MASS INDEX: 21.85 KG/M2 | TEMPERATURE: 98.1 F | WEIGHT: 147.93 LBS | HEART RATE: 68 BPM | RESPIRATION RATE: 18 BRPM | DIASTOLIC BLOOD PRESSURE: 84 MMHG

## 2024-09-12 DIAGNOSIS — L30.1 DYSHIDROTIC ECZEMA: Primary | ICD-10-CM

## 2024-09-12 PROCEDURE — 99283 EMERGENCY DEPT VISIT LOW MDM: CPT | Performed by: EMERGENCY MEDICINE

## 2024-09-12 PROCEDURE — 99282 EMERGENCY DEPT VISIT SF MDM: CPT

## 2024-09-12 NOTE — ED PROVIDER NOTES
1. Dyshidrotic eczema      ED Disposition       ED Disposition   Discharge    Condition   Stable    Date/Time   Thu Sep 12, 2024  5:22 PM    Comment   Mireya Gandhi discharge to home/self care.                   Assessment & Plan       Medical Decision Making  Problems Addressed:  Dyshidrotic eczema:     Details: Presentation c/w dyshidrotic eczema.  Strongly believe patient is picking and peeling at skin despite claims she's not.  Advised to limit water exposure, use of an emoliant with cotton/knit gloves (was using rubber gloves.)                        Medications - No data to display    History of Present Illness       Patient is a 49-year-old female who presents with a several day h/o dry, cracked and peeling skin on b/l hands.  Believes its related to an antibiotic she had last month.  Admits to heavy hand washing lately due to moving.  Denies peeling any of the skin.              Review of Systems   Constitutional: Negative.    HENT: Negative.     Eyes: Negative.    Respiratory: Negative.     Cardiovascular: Negative.    Gastrointestinal: Negative.    Endocrine: Negative.    Genitourinary: Negative.    Musculoskeletal: Negative.    Skin:  Positive for rash.   Allergic/Immunologic: Negative.    Neurological: Negative.    Hematological: Negative.    Psychiatric/Behavioral: Negative.     All other systems reviewed and are negative.          Objective     ED Triage Vitals   Temperature Pulse Blood Pressure Respirations SpO2 Patient Position - Orthostatic VS   09/12/24 1708 09/12/24 1710 09/12/24 1710 09/12/24 1710 09/12/24 1710 --   98.1 °F (36.7 °C) 68 120/84 18 97 %       Temp Source Heart Rate Source BP Location FiO2 (%) Pain Score    09/12/24 1708 -- -- -- 09/12/24 1710    Oral    10 - Worst Possible Pain        Physical Exam  Vitals and nursing note reviewed.   Constitutional:       Appearance: Normal appearance.   HENT:      Head: Atraumatic.   Skin:     Comments: Dry cracked knucles on b/l hands.    Top layer of dermis missing on b/l palms with cracks.  No other rash/peeling on skin   Neurological:      Mental Status: She is alert.         Labs Reviewed - No data to display  No orders to display       Procedures       Rojelio Hoffmann MD  09/12/24 0485

## 2024-09-16 ENCOUNTER — DOCUMENTATION (OUTPATIENT)
Dept: PSYCHIATRY | Facility: CLINIC | Age: 49
End: 2024-09-16

## 2024-09-24 ENCOUNTER — HOSPITAL ENCOUNTER (OUTPATIENT)
Facility: HOSPITAL | Age: 49
Setting detail: OBSERVATION
Discharge: HOME | End: 2024-09-25
Attending: EMERGENCY MEDICINE | Admitting: FAMILY MEDICINE
Payer: MEDICARE

## 2024-09-24 DIAGNOSIS — F12.90 CANNABINOID HYPEREMESIS SYNDROME: ICD-10-CM

## 2024-09-24 DIAGNOSIS — R21 RASH OF BOTH HANDS: ICD-10-CM

## 2024-09-24 DIAGNOSIS — F19.90 POLYSUBSTANCE USE DISORDER: ICD-10-CM

## 2024-09-24 DIAGNOSIS — Z72.0 TOBACCO ABUSE: ICD-10-CM

## 2024-09-24 DIAGNOSIS — R11.10 INTRACTABLE VOMITING: Primary | ICD-10-CM

## 2024-09-24 DIAGNOSIS — R11.2 CANNABINOID HYPEREMESIS SYNDROME: ICD-10-CM

## 2024-09-24 PROBLEM — S22.42XA CLOSED FRACTURE OF MULTIPLE RIBS OF LEFT SIDE: Status: RESOLVED | Noted: 2020-02-04 | Resolved: 2024-09-24

## 2024-09-24 PROBLEM — D72.829 LEUKOCYTOSIS: Status: ACTIVE | Noted: 2024-09-24

## 2024-09-24 PROBLEM — R03.0 ELEVATED BLOOD PRESSURE READING: Status: ACTIVE | Noted: 2024-09-24

## 2024-09-24 PROBLEM — R00.2 PALPITATIONS: Status: RESOLVED | Noted: 2021-02-26 | Resolved: 2024-09-24

## 2024-09-24 PROBLEM — J94.2 HEMOTHORAX: Status: RESOLVED | Noted: 2020-02-18 | Resolved: 2024-09-24

## 2024-09-24 LAB
ALBUMIN SERPL BCG-MCNC: 4.6 G/DL (ref 3.5–5)
ALP SERPL-CCNC: 106 U/L (ref 34–104)
ALT SERPL W P-5'-P-CCNC: 12 U/L (ref 7–52)
AMPHETAMINES SERPL QL SCN: NEGATIVE
ANION GAP SERPL CALCULATED.3IONS-SCNC: 14 MMOL/L (ref 4–13)
AST SERPL W P-5'-P-CCNC: 16 U/L (ref 13–39)
BARBITURATES UR QL: NEGATIVE
BASOPHILS # BLD AUTO: 0.05 THOUSANDS/ΜL (ref 0–0.1)
BASOPHILS NFR BLD AUTO: 0 % (ref 0–1)
BENZODIAZ UR QL: NEGATIVE
BILIRUB SERPL-MCNC: 0.85 MG/DL (ref 0.2–1)
BUN SERPL-MCNC: 15 MG/DL (ref 5–25)
CALCIUM SERPL-MCNC: 9.5 MG/DL (ref 8.4–10.2)
CHLORIDE SERPL-SCNC: 102 MMOL/L (ref 96–108)
CO2 SERPL-SCNC: 20 MMOL/L (ref 21–32)
COCAINE UR QL: POSITIVE
CREAT SERPL-MCNC: 0.98 MG/DL (ref 0.6–1.3)
EOSINOPHIL # BLD AUTO: 0.02 THOUSAND/ΜL (ref 0–0.61)
EOSINOPHIL NFR BLD AUTO: 0 % (ref 0–6)
ERYTHROCYTE [DISTWIDTH] IN BLOOD BY AUTOMATED COUNT: 13.1 % (ref 11.6–15.1)
FENTANYL UR QL SCN: NEGATIVE
GFR SERPL CREATININE-BSD FRML MDRD: 68 ML/MIN/1.73SQ M
GLUCOSE SERPL-MCNC: 145 MG/DL (ref 65–140)
HCT VFR BLD AUTO: 40.7 % (ref 34.8–46.1)
HGB BLD-MCNC: 13.6 G/DL (ref 11.5–15.4)
HYDROCODONE UR QL SCN: NEGATIVE
IMM GRANULOCYTES # BLD AUTO: 0.05 THOUSAND/UL (ref 0–0.2)
IMM GRANULOCYTES NFR BLD AUTO: 0 % (ref 0–2)
LIPASE SERPL-CCNC: 19 U/L (ref 11–82)
LYMPHOCYTES # BLD AUTO: 1.5 THOUSANDS/ΜL (ref 0.6–4.47)
LYMPHOCYTES NFR BLD AUTO: 12 % (ref 14–44)
MCH RBC QN AUTO: 29.3 PG (ref 26.8–34.3)
MCHC RBC AUTO-ENTMCNC: 33.4 G/DL (ref 31.4–37.4)
MCV RBC AUTO: 88 FL (ref 82–98)
METHADONE UR QL: NEGATIVE
MONOCYTES # BLD AUTO: 0.38 THOUSAND/ΜL (ref 0.17–1.22)
MONOCYTES NFR BLD AUTO: 3 % (ref 4–12)
NEUTROPHILS # BLD AUTO: 10.84 THOUSANDS/ΜL (ref 1.85–7.62)
NEUTS SEG NFR BLD AUTO: 85 % (ref 43–75)
NRBC BLD AUTO-RTO: 0 /100 WBCS
OPIATES UR QL SCN: NEGATIVE
OXYCODONE+OXYMORPHONE UR QL SCN: NEGATIVE
PCP UR QL: NEGATIVE
PLATELET # BLD AUTO: 295 THOUSANDS/UL (ref 149–390)
PMV BLD AUTO: 9.5 FL (ref 8.9–12.7)
POTASSIUM SERPL-SCNC: 3.4 MMOL/L (ref 3.5–5.3)
PROT SERPL-MCNC: 7.5 G/DL (ref 6.4–8.4)
RBC # BLD AUTO: 4.64 MILLION/UL (ref 3.81–5.12)
SODIUM SERPL-SCNC: 136 MMOL/L (ref 135–147)
THC UR QL: POSITIVE
WBC # BLD AUTO: 12.84 THOUSAND/UL (ref 4.31–10.16)

## 2024-09-24 PROCEDURE — 96376 TX/PRO/DX INJ SAME DRUG ADON: CPT

## 2024-09-24 PROCEDURE — 80307 DRUG TEST PRSMV CHEM ANLYZR: CPT | Performed by: PHYSICIAN ASSISTANT

## 2024-09-24 PROCEDURE — 99204 OFFICE O/P NEW MOD 45 MIN: CPT | Performed by: INTERNAL MEDICINE

## 2024-09-24 PROCEDURE — 99285 EMERGENCY DEPT VISIT HI MDM: CPT

## 2024-09-24 PROCEDURE — 99285 EMERGENCY DEPT VISIT HI MDM: CPT | Performed by: EMERGENCY MEDICINE

## 2024-09-24 PROCEDURE — 83690 ASSAY OF LIPASE: CPT | Performed by: EMERGENCY MEDICINE

## 2024-09-24 PROCEDURE — 99223 1ST HOSP IP/OBS HIGH 75: CPT | Performed by: FAMILY MEDICINE

## 2024-09-24 PROCEDURE — 96361 HYDRATE IV INFUSION ADD-ON: CPT

## 2024-09-24 PROCEDURE — 96375 TX/PRO/DX INJ NEW DRUG ADDON: CPT

## 2024-09-24 PROCEDURE — 96374 THER/PROPH/DIAG INJ IV PUSH: CPT

## 2024-09-24 PROCEDURE — 96372 THER/PROPH/DIAG INJ SC/IM: CPT

## 2024-09-24 PROCEDURE — 85025 COMPLETE CBC W/AUTO DIFF WBC: CPT | Performed by: EMERGENCY MEDICINE

## 2024-09-24 PROCEDURE — 80053 COMPREHEN METABOLIC PANEL: CPT | Performed by: EMERGENCY MEDICINE

## 2024-09-24 PROCEDURE — 36415 COLL VENOUS BLD VENIPUNCTURE: CPT | Performed by: EMERGENCY MEDICINE

## 2024-09-24 RX ORDER — KETOROLAC TROMETHAMINE 30 MG/ML
15 INJECTION, SOLUTION INTRAMUSCULAR; INTRAVENOUS ONCE
Status: COMPLETED | OUTPATIENT
Start: 2024-09-24 | End: 2024-09-24

## 2024-09-24 RX ORDER — POTASSIUM CHLORIDE 14.9 MG/ML
20 INJECTION INTRAVENOUS ONCE
Status: COMPLETED | OUTPATIENT
Start: 2024-09-24 | End: 2024-09-24

## 2024-09-24 RX ORDER — NICOTINE 21 MG/24HR
1 PATCH, TRANSDERMAL 24 HOURS TRANSDERMAL DAILY
Status: DISCONTINUED | OUTPATIENT
Start: 2024-09-24 | End: 2024-09-25 | Stop reason: HOSPADM

## 2024-09-24 RX ORDER — SODIUM CHLORIDE 9 MG/ML
125 INJECTION, SOLUTION INTRAVENOUS CONTINUOUS
Status: DISCONTINUED | OUTPATIENT
Start: 2024-09-24 | End: 2024-09-25 | Stop reason: HOSPADM

## 2024-09-24 RX ORDER — PRAZOSIN HYDROCHLORIDE 1 MG/1
2 CAPSULE ORAL
Status: DISCONTINUED | OUTPATIENT
Start: 2024-09-24 | End: 2024-09-25 | Stop reason: HOSPADM

## 2024-09-24 RX ORDER — LURASIDONE HYDROCHLORIDE 40 MG/1
40 TABLET, FILM COATED ORAL
Status: DISCONTINUED | OUTPATIENT
Start: 2024-09-24 | End: 2024-09-25 | Stop reason: HOSPADM

## 2024-09-24 RX ORDER — ONDANSETRON 2 MG/ML
4 INJECTION INTRAMUSCULAR; INTRAVENOUS ONCE
Status: COMPLETED | OUTPATIENT
Start: 2024-09-24 | End: 2024-09-24

## 2024-09-24 RX ORDER — DESVENLAFAXINE 50 MG/1
100 TABLET, FILM COATED, EXTENDED RELEASE ORAL DAILY
Status: DISCONTINUED | OUTPATIENT
Start: 2024-09-24 | End: 2024-09-25 | Stop reason: HOSPADM

## 2024-09-24 RX ORDER — BUSPIRONE HYDROCHLORIDE 10 MG/1
30 TABLET ORAL 2 TIMES DAILY
Status: DISCONTINUED | OUTPATIENT
Start: 2024-09-24 | End: 2024-09-25 | Stop reason: HOSPADM

## 2024-09-24 RX ORDER — CAPSAICIN 0.025 %
CREAM (GRAM) TOPICAL 3 TIMES DAILY
Status: DISCONTINUED | OUTPATIENT
Start: 2024-09-24 | End: 2024-09-25

## 2024-09-24 RX ORDER — DIPHENHYDRAMINE HYDROCHLORIDE 50 MG/ML
25 INJECTION INTRAMUSCULAR; INTRAVENOUS ONCE
Status: COMPLETED | OUTPATIENT
Start: 2024-09-24 | End: 2024-09-24

## 2024-09-24 RX ORDER — PANTOPRAZOLE SODIUM 40 MG/1
40 TABLET, DELAYED RELEASE ORAL
Status: DISCONTINUED | OUTPATIENT
Start: 2024-09-24 | End: 2024-09-25 | Stop reason: HOSPADM

## 2024-09-24 RX ORDER — HYDROXYZINE HYDROCHLORIDE 50 MG/1
50 TABLET, FILM COATED ORAL 3 TIMES DAILY
Status: DISCONTINUED | OUTPATIENT
Start: 2024-09-24 | End: 2024-09-25 | Stop reason: HOSPADM

## 2024-09-24 RX ORDER — DICYCLOMINE HCL 20 MG
20 TABLET ORAL EVERY 6 HOURS
Status: DISCONTINUED | OUTPATIENT
Start: 2024-09-24 | End: 2024-09-25 | Stop reason: HOSPADM

## 2024-09-24 RX ORDER — PANTOPRAZOLE SODIUM 40 MG/10ML
40 INJECTION, POWDER, LYOPHILIZED, FOR SOLUTION INTRAVENOUS ONCE
Status: COMPLETED | OUTPATIENT
Start: 2024-09-24 | End: 2024-09-24

## 2024-09-24 RX ORDER — DROPERIDOL 2.5 MG/ML
0.62 INJECTION, SOLUTION INTRAMUSCULAR; INTRAVENOUS ONCE
Status: COMPLETED | OUTPATIENT
Start: 2024-09-24 | End: 2024-09-24

## 2024-09-24 RX ORDER — KETOROLAC TROMETHAMINE 30 MG/ML
15 INJECTION, SOLUTION INTRAMUSCULAR; INTRAVENOUS ONCE
Status: DISCONTINUED | OUTPATIENT
Start: 2024-09-24 | End: 2024-09-24

## 2024-09-24 RX ORDER — METOCLOPRAMIDE HYDROCHLORIDE 5 MG/ML
10 INJECTION INTRAMUSCULAR; INTRAVENOUS ONCE
Status: COMPLETED | OUTPATIENT
Start: 2024-09-24 | End: 2024-09-24

## 2024-09-24 RX ORDER — HALOPERIDOL 5 MG/ML
5 INJECTION INTRAMUSCULAR ONCE
Status: COMPLETED | OUTPATIENT
Start: 2024-09-24 | End: 2024-09-24

## 2024-09-24 RX ORDER — LOSARTAN POTASSIUM 25 MG/1
25 TABLET ORAL DAILY
Status: DISCONTINUED | OUTPATIENT
Start: 2024-09-24 | End: 2024-09-25 | Stop reason: HOSPADM

## 2024-09-24 RX ORDER — ACETAMINOPHEN 325 MG/1
650 TABLET ORAL EVERY 6 HOURS PRN
Status: DISCONTINUED | OUTPATIENT
Start: 2024-09-24 | End: 2024-09-25 | Stop reason: HOSPADM

## 2024-09-24 RX ORDER — BUDESONIDE AND FORMOTEROL FUMARATE DIHYDRATE 80; 4.5 UG/1; UG/1
2 AEROSOL RESPIRATORY (INHALATION) 2 TIMES DAILY
Status: DISCONTINUED | OUTPATIENT
Start: 2024-09-24 | End: 2024-09-25 | Stop reason: HOSPADM

## 2024-09-24 RX ORDER — MORPHINE SULFATE 4 MG/ML
4 INJECTION, SOLUTION INTRAMUSCULAR; INTRAVENOUS ONCE
Status: COMPLETED | OUTPATIENT
Start: 2024-09-24 | End: 2024-09-24

## 2024-09-24 RX ADMIN — HYDROXYZINE HYDROCHLORIDE 50 MG: 50 TABLET, FILM COATED ORAL at 08:03

## 2024-09-24 RX ADMIN — KETOROLAC TROMETHAMINE 15 MG: 30 INJECTION, SOLUTION INTRAMUSCULAR; INTRAVENOUS at 03:34

## 2024-09-24 RX ADMIN — PANTOPRAZOLE SODIUM 40 MG: 40 INJECTION, POWDER, FOR SOLUTION INTRAVENOUS at 04:42

## 2024-09-24 RX ADMIN — BUPRENORPHINE AND NALOXONE 12 MG: 8; 2 FILM BUCCAL; SUBLINGUAL at 21:52

## 2024-09-24 RX ADMIN — DESVENLAFAXINE 100 MG: 50 TABLET, FILM COATED, EXTENDED RELEASE ORAL at 08:03

## 2024-09-24 RX ADMIN — BUSPIRONE HYDROCHLORIDE 30 MG: 10 TABLET ORAL at 17:25

## 2024-09-24 RX ADMIN — DICYCLOMINE HYDROCHLORIDE 20 MG: 20 TABLET ORAL at 08:02

## 2024-09-24 RX ADMIN — TRIMETHOBENZAMIDE HYDROCHLORIDE 200 MG: 100 INJECTION INTRAMUSCULAR at 09:29

## 2024-09-24 RX ADMIN — ONDANSETRON 4 MG: 2 INJECTION INTRAMUSCULAR; INTRAVENOUS at 04:03

## 2024-09-24 RX ADMIN — HYDROXYZINE HYDROCHLORIDE 50 MG: 50 TABLET, FILM COATED ORAL at 21:52

## 2024-09-24 RX ADMIN — METOCLOPRAMIDE 10 MG: 5 INJECTION, SOLUTION INTRAMUSCULAR; INTRAVENOUS at 04:26

## 2024-09-24 RX ADMIN — DICYCLOMINE HYDROCHLORIDE 20 MG: 20 TABLET ORAL at 11:35

## 2024-09-24 RX ADMIN — SODIUM CHLORIDE 1000 ML: 0.9 INJECTION, SOLUTION INTRAVENOUS at 05:01

## 2024-09-24 RX ADMIN — POTASSIUM CHLORIDE 20 MEQ: 14.9 INJECTION, SOLUTION INTRAVENOUS at 05:03

## 2024-09-24 RX ADMIN — CYANOCOBALAMIN TAB 1000 MCG 1000 MCG: 1000 TAB at 08:03

## 2024-09-24 RX ADMIN — BUSPIRONE HYDROCHLORIDE 30 MG: 10 TABLET ORAL at 08:03

## 2024-09-24 RX ADMIN — HALOPERIDOL LACTATE 5 MG: 5 INJECTION, SOLUTION INTRAMUSCULAR at 04:42

## 2024-09-24 RX ADMIN — LOSARTAN POTASSIUM 25 MG: 25 TABLET, FILM COATED ORAL at 08:03

## 2024-09-24 RX ADMIN — BUPRENORPHINE AND NALOXONE 12 MG: 8; 2 FILM BUCCAL; SUBLINGUAL at 08:02

## 2024-09-24 RX ADMIN — LURASIDONE HYDROCHLORIDE 40 MG: 40 TABLET, COATED ORAL at 16:38

## 2024-09-24 RX ADMIN — DIPHENHYDRAMINE HYDROCHLORIDE 25 MG: 50 INJECTION, SOLUTION INTRAMUSCULAR; INTRAVENOUS at 04:26

## 2024-09-24 RX ADMIN — DROPERIDOL 0.62 MG: 2.5 INJECTION, SOLUTION INTRAMUSCULAR; INTRAVENOUS at 03:19

## 2024-09-24 RX ADMIN — SODIUM CHLORIDE 125 ML/HR: 0.9 INJECTION, SOLUTION INTRAVENOUS at 20:14

## 2024-09-24 RX ADMIN — DICYCLOMINE HYDROCHLORIDE 20 MG: 20 TABLET ORAL at 17:25

## 2024-09-24 RX ADMIN — HYDROXYZINE HYDROCHLORIDE 50 MG: 50 TABLET, FILM COATED ORAL at 16:37

## 2024-09-24 RX ADMIN — KETOROLAC TROMETHAMINE 15 MG: 30 INJECTION, SOLUTION INTRAMUSCULAR; INTRAVENOUS at 04:26

## 2024-09-24 RX ADMIN — SODIUM CHLORIDE 1000 ML: 0.9 INJECTION, SOLUTION INTRAVENOUS at 03:20

## 2024-09-24 RX ADMIN — SODIUM CHLORIDE 125 ML/HR: 0.9 INJECTION, SOLUTION INTRAVENOUS at 06:26

## 2024-09-24 RX ADMIN — BUDESONIDE AND FORMOTEROL FUMARATE DIHYDRATE 2 PUFF: 80; 4.5 AEROSOL RESPIRATORY (INHALATION) at 17:25

## 2024-09-24 RX ADMIN — MORPHINE SULFATE 4 MG: 4 INJECTION, SOLUTION INTRAMUSCULAR; INTRAVENOUS at 05:50

## 2024-09-24 RX ADMIN — BUDESONIDE AND FORMOTEROL FUMARATE DIHYDRATE 2 PUFF: 80; 4.5 AEROSOL RESPIRATORY (INHALATION) at 08:01

## 2024-09-24 RX ADMIN — CAPSAICIN: 0.25 CREAM TOPICAL at 11:35

## 2024-09-24 NOTE — ASSESSMENT & PLAN NOTE
Suspect reactive secondary to nausea and vomiting versus due to viral gastroenteritis  Monitor CBC  Continue IV fluids

## 2024-09-24 NOTE — CASE MANAGEMENT
Case Management Assessment & Discharge Planning Note    Patient name Mireya Gandhi  Location 7T [facility] 704/7T [facility] 704-01 MRN [id number]  : 1975 Date 2024       Current Admission Date: 2024  Current Admission Diagnosis:Intractable nausea and vomiting   Patient Active Problem List    Diagnosis Date Noted Date Diagnosed    Intractable nausea and vomiting 2024     Tobacco abuse 2024     Recent bereavement 2024     Opioid use disorder 2023     PTSD (post-traumatic stress disorder) 2023     Risk taking behavior 2023     Polysubstance use disorder 2021     Hyperthyroidism 2021     Mood disorder (HCC) 2021     History of cardiomyopathy 2021     Abnormal ECG 2021     Acquired QT prolongation 2021     Hypokalemia 2020       LOS (days): 0  Geometric Mean LOS (GMLOS) (days):   Days to GMLOS:     OBJECTIVE:              Current admission status: Observation       Preferred Pharmacy:   [facility] [address] [place] [id number]  Phone: [phone] Fax: [phone]    Primary Care Provider: Apolonia Burgos    Primary Insurance: [employer] MEDICARE [facility]  Secondary Insurance: [facility]    ASSESSMENT:  Active Health Care Proxies    There are no active Health Care Proxies on file.       Advance Directives  Does patient have a Health Care POA?: No  Was patient offered paperwork?: Yes (Declined)  Does patient currently have a Health Care decision maker?: No  Does patient have Advance Directives?: No  Was patient offered paperwork?: Yes (Declined)  Primary Contact: Rivka Gandhi/Sister ([phone])    Readmission Root Cause  30 Day Readmission: No    Patient Information  Admitted from:: Home  Mental Status: Alert  During Assessment patient was accompanied by: Not accompanied during assessment  Assessment information provided by::  Patient  Primary Caregiver: Self  County of Residence: [place]  What city do you live in?: [place]  Home entry access options. Select all that apply.: Stairs  Number of steps to enter home.: One Flight  Do the steps have railings?: Yes  Type of Current Residence: Other (Comment) (Rents a room)    Activities of Daily Living Prior to Admission  Functional Status: Independent  Completes ADLs independently?: Yes  Ambulates independently?: Yes  Does patient use assisted devices?: No  Does patient currently own DME?: Yes  What DME does the patient currently own?: Straight Cane  Does patient have a history of Outpatient Therapy (PT/OT)?: Yes  Does the patient have a history of Short-Term Rehab?: No  Does patient have a history of HHC?: No  Does patient currently have HHC?: No    Patient Information Continued  Income Source: SSI/SSD  Does patient have prescription coverage?: Yes  Does patient have a history of substance abuse?: Yes  Historical substance use preference: Cocaine, Marijuana  Is patient currently in treatment for substance abuse?: Yes (On Suboxone maintenance through SHARE program, declined treatment options through HOST for THC/cocaine)  Does patient have a history of Mental Health Diagnosis?: Yes (Mood disorder, PTSD)  Is patient receiving treatment for mental health?: Yes (Med management by psych, talk therapy)  Has patient received inpatient treatment related to mental health in the last 2 years?: No    Means of Transportation  Means of Transport to Appts:: Public Transportation - [facility]      Social Determinants of Health (SDOH)      Flowsheet Row Most Recent Value   Housing Stability    In the last 12 months, was there a time when you were not able to pay the mortgage or rent on time? N   In the past 12 months, how many times have you moved where you were living? 2   At any time in the past 12 months, were you homeless or living in a shelter (including now)? N   Transportation Needs    In the past 12  months, has lack of transportation kept you from medical appointments or from getting medications? no   In the past 12 months, has lack of transportation kept you from meetings, work, or from getting things needed for daily living? No   Food Insecurity    Within the past 12 months, you worried that your food would run out before you got the money to buy more. Sometimes   Within the past 12 months, the food you bought just didn't last and you didn't have money to get more. Sometimes   Utilities    In the past 12 months has the electric, gas, oil, or water company threatened to shut off services in your home? No            DISCHARGE DETAILS:    Discharge planning discussed with:: Patient     CM contacted family/caregiver?: No- see comments  Were Treatment Team discharge recommendations reviewed with patient/caregiver?: Yes  Did patient/caregiver verbalize understanding of patient care needs?: Yes  Were patient/caregiver advised of the risks associated with not following Treatment Team discharge recommendations?: Yes    Contacts  Patient Contacts: Patient  Contact Method: In Person  Reason/Outcome: Continuity of Care, Emergency Contact, Referral, Discharge Planning    Other Referral/Resources/Interventions Provided:  Interventions: FindHelp, Food Bank, Meals on Wheels  Referral Comments: Pt reqesting a referral for Meals on Wheels, referral placed via FindHelp. Additional food bank information placed on AVS.    Treatment Team Recommendation: Home  Discharge Destination Plan:: Home     Additional Comments: Patient reports that she rents a room that has a flight of steps to reach. Pt reports that she is typically independent with ADLs and ambulation, has a cane if necessary. Pt states that she is in the process of obtaining waiver aides for assistance at home. Pt reports getting suboxone maintenance through the SHARE program. CM offered HOST due to UDS being positive for THC and cocaine however pt declined at this time.  CM sent Meals on Wheels referral through [facility] per pt's request as she reports she has utilized this service in the past. Pt reports that she will likely require a Lyft ride at time of discharge. CM department to remain available for additional discharge concerns or needs.

## 2024-09-24 NOTE — H&P
H&P - Hospitalist   Name: Mireya Gandhi 49 y.o. female I MRN: [id number]  Unit/Bed#: 7T [facility] 704-01 I Date of Admission: 9/24/2024   Date of Service: 9/24/2024 I Hospital Day: 0     Assessment & Plan  Intractable nausea and vomiting  This is a 49-year-old female patient with history of substance abuse, mood disorder, cardiomyopathy who presents with 2-day history of intractable nausea and vomiting  UDS positive for THC and cocaine  Etiology suspected to be CHS versus gastroenteritis versus gastroparesis with reported history of such  Placed in observation medicine  Clear liquid diet  Continue prehospital Bentyl 20 mg p.o. every 6 hours  Substitute Protonix for prehospital Nexium  Give Tigan as needed secondary to prolonged QT  Appreciate GI input, continue with supportive treatment, no plans for invasive testing.  Polysubstance use disorder  Urine drug screen positive for THC and cocaine  Continue prehospital Suboxone 12 mg sublingual twice daily, dose and frequency confirmed with query of [place] prescription drug monitoring system  Consult case management in a.m.  Mood disorder (HCC)  Continue prehospital BuSpar 30 mg p.o. twice daily, Pristiq 100 mg p.o. daily, Atarax 50 mg p.o. 3 times daily and Latuda 40 mg p.o. daily  Tobacco abuse  Give nicotine 14 mg transdermal daily  Hypokalemia  In setting of intractable nausea and vomiting  Replaced and monitor electrolytes  Elevated blood pressure reading  Secondary to nausea and vomiting and improved  Not on chronic antihypertensives  Leukocytosis  Suspect reactive secondary to nausea and vomiting versus due to viral gastroenteritis  Monitor CBC  Continue IV fluids    VTE Pharmacologic Prophylaxis: VTE Score: 22 Low Risk (Score 0-2) - Encourage Ambulation.  Code Status: Level 1 - Full Code   Discussion with family: Patient declined call to .     Anticipated Length of Stay: Patient will be admitted on an observation basis with an anticipated  length of stay of less than 2 midnights secondary to intractable nausea and vomiting requiring IV fluid resuscitation, symptom control and further GI evaluation.    History of Present Illness   Chief Complaint: Nausea, vomiting and abdominal pain x 2 days    Mireya Gandhi is a 49 y.o. female with a PMH of polysubstance abuse including marijuana daily as well mood disorder who presents with intractable nausea and vomiting x 2 days.  Patient states that she has been told that she had gastroparesis in the past but does not currently follow with GI. The patient complains of diffuse crampy abdominal pain accompanied with multiple episodes of vomiting and nausea. Reports several episodes of loose stools.  Patient received multiple medications in ER continued to vomit and was referred for admission.  The patient reports feeling slightly better on my encounter.  She would like to try some clear liquids.  She denies chills or fevers.  Denies respiratory or urinary symptoms.  Remainder of 12 point review of systems is otherwise negative.    Review of Systems   Constitutional:  Negative for chills and fever.   HENT:  Negative for congestion and sore throat.    Respiratory:  Negative for cough, shortness of breath and wheezing.    Cardiovascular:  Negative for chest pain and palpitations.   Gastrointestinal:  Positive for abdominal pain, nausea and vomiting. Negative for diarrhea.   Genitourinary:  Negative for dysuria, frequency, hematuria and urgency.   Musculoskeletal:  Negative for arthralgias and myalgias.   Skin:  Negative for wound.   Neurological:  Negative for dizziness, syncope, light-headedness and headaches.   All other systems reviewed and are negative.      I have reviewed the patient's PMH, PSH, Social History, Family History, Meds, and Allergies  Social History:  Marital Status: Single   Occupation: on disability  Patient Pre-hospital Living Situation: Home  Patient Pre-hospital Level of Mobility:  "walks  Patient Pre-hospital Diet Restrictions: none    Objective     Vitals:   Blood Pressure: 156/96 (09/24/24 0729)  Pulse: 60 (09/24/24 0729)  Temperature: (!) 97.4 °F (36.3 °C) (09/24/24 0729)  Temp Source: Temporal (09/24/24 0729)  Respirations: 20 (09/24/24 0729)  Height: 5' 9\" (175.3 cm) (09/24/24 0729)  Weight - Scale: 65.8 kg (145 lb 1 oz) (09/24/24 0729)  SpO2: 99 % (09/24/24 0729)    Physical Exam  Vitals and nursing note reviewed.   Constitutional:       Appearance: She is well-developed.   HENT:      Head: Normocephalic and atraumatic.      Mouth/Throat:      Mouth: Mucous membranes are moist.      Pharynx: No oropharyngeal exudate.   Eyes:      General: No scleral icterus.     Extraocular Movements: EOM normal.      Conjunctiva/sclera: Conjunctivae normal.   Neck:      Vascular: No JVD.   Cardiovascular:      Rate and Rhythm: Normal rate and regular rhythm.   Pulmonary:      Effort: Pulmonary effort is normal.      Breath sounds: Normal breath sounds.   Abdominal:      General: Abdomen is flat. Bowel sounds are normal.      Palpations: Abdomen is soft.      Tenderness: There is abdominal tenderness (lower).   Musculoskeletal:         General: No edema. Normal range of motion.      Cervical back: Normal range of motion and neck supple.      Right lower leg: No edema.      Left lower leg: No edema.   Skin:     General: Skin is warm and dry.   Neurological:      Mental Status: She is alert and oriented to person, place, and time.   Psychiatric:         Mood and Affect: Mood and affect and mood normal.         Behavior: Behavior normal.         Lines/Drains:  Lines/Drains/Airways       Active Status       None                        Additional Data:   Lab Results: I have reviewed the following results: CBC/BMP:   .     09/24/24 0244   WBC 12.84*   HGB 13.6   HCT 40.7      SODIUM 136   K 3.4*      CO2 20*   BUN 15   CREATININE 0.98   GLUC 145*     .     09/24/24 0244   AST 16   ALT 12   ALB " 4.6   TBILI 0.85   ALKPHOS 106*     Results from last 7 days   Lab Units 09/24/24  0529   BARBITURATE UR  Negative   BENZODIAZEPINE UR  Negative   THC UR  Positive*   COCAINE UR  Positive*   METHADONE URINE  Negative   OPIATE UR  Negative   PCP UR  Negative     Results from last 7 days   Lab Units 09/24/24  0244   WBC Thousand/uL 12.84*   HEMOGLOBIN g/dL 13.6   HEMATOCRIT % 40.7   PLATELETS Thousands/uL 295   SEGS PCT % 85*   LYMPHO PCT % 12*   MONO PCT % 3*   EOS PCT % 0     Results from last 7 days   Lab Units 09/24/24  0244   SODIUM mmol/L 136   POTASSIUM mmol/L 3.4*   CHLORIDE mmol/L 102   CO2 mmol/L 20*   BUN mg/dL 15   CREATININE mg/dL 0.98   ANION GAP mmol/L 14*   CALCIUM mg/dL 9.5   ALBUMIN g/dL 4.6   TOTAL BILIRUBIN mg/dL 0.85   ALK PHOS U/L 106*   ALT U/L 12   AST U/L 16   GLUCOSE RANDOM mg/dL 145*             Lab Results   Component Value Date    HGBA1C 5.7 (H) 07/19/2024    HGBA1C 5.9 (H) 11/14/2022    HGBA1C 5.8 (H) 09/30/2019           Imaging Review: Personally reviewed the following image studies in PACS and associated radiology reports: chest xray. My interpretation of the radiology images/reports is: No acute findings.  Other Studies: No additional pertinent studies reviewed.    Administrative Statements   I have spent a total time of 60 minutes in caring for this patient on the day of the visit/encounter including Diagnostic results, Patient and family education, Impressions, Documenting in the medical record, and Reviewing / ordering tests, medicine, procedures  .    ** Please Note: This note has been constructed using a voice recognition system. **

## 2024-09-24 NOTE — ASSESSMENT & PLAN NOTE
Continue prehospital BuSpar 30 mg p.o. twice daily, Pristiq 100 mg p.o. daily, Atarax 50 mg p.o. 3 times daily and Latuda 40 mg p.o. daily

## 2024-09-24 NOTE — ED PROVIDER NOTES
1. Intractable vomiting    2. Cannabinoid hyperemesis syndrome      ED Disposition       ED Disposition   Admit    Condition   Stable    Date/Time   Tue Sep 24, 2024  4:58 AM    Comment   Case was discussed with Pedro Pablo and the patient's admission status was agreed to be Admission Status: observation status to the service of Dr. Lemos .               Assessment & Plan       Medical Decision Making  49-year-old female presenting to the emerged department with nausea vomiting abdominal pain.  Differential diagnose includes gastroenteritis, appendicitis, diverticulitis, cholecystitis, cannabis hyperemesis syndrome.  Patient has no abdominal tenderness to palpation, do not think appendicitis, cholecystitis or diverticulitis.  Likely cannabis hyperemesis syndrome.  Difficult to control her nausea vomiting despite droperidol, Reglan Benadryl, Zofran.  Will give Haldol at this time, considering admission for intractable nausea vomiting.    Amount and/or Complexity of Data Reviewed  Labs: ordered.    Risk  Prescription drug management.  Decision regarding hospitalization.                     Medications   sodium chloride 0.9 % bolus 1,000 mL (1,000 mL Intravenous New Bag 9/24/24 0501)   potassium chloride 20 mEq IVPB (premix) (has no administration in time range)   sodium chloride 0.9 % infusion (has no administration in time range)   acetaminophen (TYLENOL) tablet 650 mg (has no administration in time range)   nicotine (NICODERM CQ) 14 mg/24hr TD 24 hr patch 1 patch (has no administration in time range)   trimethobenzamide (TIGAN) IM injection 200 mg (has no administration in time range)   sodium chloride 0.9 % bolus 1,000 mL (0 mL Intravenous Stopped 9/24/24 0501)   droperidol (INAPSINE) injection 0.625 mg (0.625 mg Intravenous Given 9/24/24 0319)   ketorolac (TORADOL) injection 15 mg (15 mg Intravenous Given 9/24/24 0334)   ondansetron (ZOFRAN) injection 4 mg (4 mg Intravenous Given 9/24/24 0403)   metoclopramide (REGLAN)  injection 10 mg (10 mg Intravenous Given 9/24/24 0426)   diphenhydrAMINE (BENADRYL) injection 25 mg (25 mg Intravenous Given 9/24/24 0426)   ketorolac (TORADOL) injection 15 mg (15 mg Intravenous Given 9/24/24 0426)   haloperidol lactate (HALDOL) injection 5 mg (5 mg Intramuscular Given 9/24/24 0442)   pantoprazole (PROTONIX) injection 40 mg (40 mg Intravenous Given 9/24/24 0442)       History of Present Illness       49-year-old female presenting to the emerged department with nausea vomiting that started yesterday.  Patient has uses marijuana daily.  History of gastroparesis.  Notes feels like prior episodes of gastroparesis.  Minimal abdominal pain.  Nonbloody nonbilious emesis.  No diarrhea.  No blood in stool.        Review of Systems   Constitutional:  Negative for chills and fever.   HENT:  Negative for ear pain and sore throat.    Eyes:  Negative for pain and visual disturbance.   Respiratory:  Negative for cough and shortness of breath.    Cardiovascular:  Negative for chest pain and palpitations.   Gastrointestinal:  Positive for abdominal pain, nausea and vomiting.   Genitourinary:  Negative for dysuria and hematuria.   Musculoskeletal:  Negative for arthralgias and back pain.   Skin:  Negative for color change and rash.   Neurological:  Negative for seizures and syncope.   All other systems reviewed and are negative.          Objective     ED Triage Vitals [09/24/24 0238]   Temperature Pulse Blood Pressure Respirations SpO2 Patient Position - Orthostatic VS   98.3 °F (36.8 °C) 55 (!) 185/90 20 100 % Lying      Temp Source Heart Rate Source BP Location FiO2 (%) Pain Score    Oral Monitor Right arm -- --        Physical Exam  Vitals and nursing note reviewed.   Constitutional:       General: She is not in acute distress.     Appearance: She is well-developed.   HENT:      Head: Normocephalic and atraumatic.   Eyes:      Conjunctiva/sclera: Conjunctivae normal.   Cardiovascular:      Rate and Rhythm: Normal  rate and regular rhythm.      Heart sounds: No murmur heard.  Pulmonary:      Effort: Pulmonary effort is normal. No respiratory distress.      Breath sounds: Normal breath sounds.   Abdominal:      Palpations: Abdomen is soft.      Tenderness: There is no abdominal tenderness. There is no right CVA tenderness, left CVA tenderness, guarding or rebound.   Musculoskeletal:         General: No swelling.      Cervical back: Neck supple.   Skin:     General: Skin is warm and dry.      Capillary Refill: Capillary refill takes less than 2 seconds.   Neurological:      Mental Status: She is alert.   Psychiatric:         Mood and Affect: Mood normal.         Labs Reviewed   CBC AND DIFFERENTIAL - Abnormal       Result Value    WBC 12.84 (*)     RBC 4.64      Hemoglobin 13.6      Hematocrit 40.7      MCV 88      MCH 29.3      MCHC 33.4      RDW 13.1      MPV 9.5      Platelets 295      nRBC 0      Segmented % 85 (*)     Immature Grans % 0      Lymphocytes % 12 (*)     Monocytes % 3 (*)     Eosinophils Relative 0      Basophils Relative 0      Absolute Neutrophils 10.84 (*)     Absolute Immature Grans 0.05      Absolute Lymphocytes 1.50      Absolute Monocytes 0.38      Eosinophils Absolute 0.02      Basophils Absolute 0.05     COMPREHENSIVE METABOLIC PANEL - Abnormal    Sodium 136      Potassium 3.4 (*)     Chloride 102      CO2 20 (*)     ANION GAP 14 (*)     BUN 15      Creatinine 0.98      Glucose 145 (*)     Calcium 9.5      AST 16      ALT 12      Alkaline Phosphatase 106 (*)     Total Protein 7.5      Albumin 4.6      Total Bilirubin 0.85      eGFR 68      Narrative:     National Kidney Disease Foundation guidelines for Chronic Kidney Disease (CKD):     Stage 1 with normal or high GFR (GFR > 90 mL/min/1.73 square meters)    Stage 2 Mild CKD (GFR = 60-89 mL/min/1.73 square meters)    Stage 3A Moderate CKD (GFR = 45-59 mL/min/1.73 square meters)    Stage 3B Moderate CKD (GFR = 30-44 mL/min/1.73 square meters)    Stage 4  Severe CKD (GFR = 15-29 mL/min/1.73 square meters)    Stage 5 End Stage CKD (GFR <15 mL/min/1.73 square meters)  Note: GFR calculation is accurate only with a steady state creatinine   LIPASE - Normal    Lipase 19     RAPID DRUG SCREEN, URINE     No orders to display       Procedures    ED Medication and Procedure Management   Prior to Admission Medications   Prescriptions Last Dose Informant Patient Reported? Taking?   CombiPatch 0.05-0.14 MG/DAY   Yes No   Sig: Place 1 patch on the skin 2 (two) times a week   Hydrocortisone/Aloe Max Str 1 % cream   Yes No   Latuda 40 MG tablet   Yes No   Sig: Take 40 mg by mouth daily with dinner   budesonide-formoterol (SYMBICORT) 80-4.5 MCG/ACT inhaler   Yes No   Sig: Inhale 2 puffs 2 (two) times a day   buprenorphine-naloxone (Suboxone) 12-3 MG   No No   Sig: Place 1 Film (12 mg total) under the tongue 2 (two) times a day   busPIRone (BUSPAR) 30 MG tablet   Yes No   Sig: Take 30 mg by mouth 2 (two) times a day   desvenlafaxine (PRISTIQ) 100 mg 24 hr tablet   Yes No   Sig: Take 100 mg by mouth daily   dicyclomine (BENTYL) 20 mg tablet   Yes No   Sig: Take 20 mg by mouth every 6 (six) hours   esomeprazole (NexIUM) 40 MG capsule   Yes No   Sig: Take 40 mg by mouth   hydrOXYzine pamoate (VISTARIL) 50 mg capsule   No No   Sig: Take 1 capsule (50 mg total) by mouth 3 (three) times a day   losartan (COZAAR) 25 mg tablet   Yes No   Sig: Take 25 mg by mouth daily   prazosin (MINIPRESS) 2 mg capsule   No No   Sig: Take 1 capsule (2 mg total) by mouth daily at bedtime   vitamin B-12 (VITAMIN B-12) 1,000 mcg tablet   Yes No   Sig: Take 1,000 mcg by mouth daily      Facility-Administered Medications: None     Patient's Medications   Discharge Prescriptions    No medications on file     No discharge procedures on file.     Saul Scherer MD  09/24/24 0844

## 2024-09-24 NOTE — DISCHARGE INSTR - OTHER ORDERS
Food Pantries  [facility]- [address] [facility], [place] [address]; [phone]   [facility]-[address] [facility] [place] [address]; [phone]  [employer] [facility]-[address] [place] [address]; [phone]  [place]  [facility] [facility] [place] [address]; [phone]  [facility] [address] [place] [address] [place] [address]; [phone]   [facility]-[address] [place] [address] [place] [address]; [phone]   [employer]- [address] [facility] [place] [address], [place] [address]; [phone]  [facility]-[address] [facility], [place] [address]; [phone]  [facility] [facility], [place] [address]; [phone]    [employer] [place]  [facility]-[address] [facility] [place] [address]; [phone]  [facility] [facility] [place] [address]  [facility] - [address] [facility] [place] [address]: breakfast M-F 8-9am; lunch T-W-Th 12-1pm  [facility]-[address] [facility] [address]; [phone]   [facility]-[address]- [address] [facility] [place] [address]; [phone]  [facility]- [address] [facility] [place] [address]; [phone]: F/M 12:30-1:30   [place]  [facility]- [address] [facility], [place] [address]; [phone]  [facility]-[address] [place] [address], [place] [address]; [phone]  [place] [employer]-[address] [facility], [place] [address]; [phone]  [facility] [facility] [facility] [address]; [phone]

## 2024-09-24 NOTE — PLAN OF CARE
Problem: GASTROINTESTINAL - ADULT  Goal: Minimal or absence of nausea and/or vomiting  Description: INTERVENTIONS:  - Administer IV fluids if ordered to ensure adequate hydration  - Maintain NPO status until nausea and vomiting are resolved  - Nasogastric tube if ordered  - Administer ordered antiemetic medications as needed  - Provide nonpharmacologic comfort measures as appropriate  - Advance diet as tolerated, if ordered  - Consider nutrition services referral to assist patient with adequate nutrition and appropriate food choices  Outcome: Progressing  Goal: Maintains or returns to baseline bowel function  Description: INTERVENTIONS:  - Assess bowel function  - Encourage oral fluids to ensure adequate hydration  - Administer IV fluids if ordered to ensure adequate hydration  - Administer ordered medications as needed  - Encourage mobilization and activity  - Consider nutritional services referral to assist patient with adequate nutrition and appropriate food choices  Outcome: Progressing  Goal: Maintains adequate nutritional intake  Description: INTERVENTIONS:  - Monitor percentage of each meal consumed  - Identify factors contributing to decreased intake, treat as appropriate  - Assist with meals as needed  - Monitor I&O, weight, and lab values if indicated  - Obtain nutrition services referral as needed  Outcome: Progressing  Goal: Oral mucous membranes remain intact  Description: INTERVENTIONS  - Assess oral mucosa and hygiene practices  - Implement preventative oral hygiene regimen  - Implement oral medicated treatments as ordered  - Initiate Nutrition services referral as needed  Outcome: Progressing     Problem: PAIN - ADULT  Goal: Verbalizes/displays adequate comfort level or baseline comfort level  Description: Interventions:  - Encourage patient to monitor pain and request assistance  - Assess pain using appropriate pain scale  - Administer analgesics based on type and severity of pain and evaluate  response  - Implement non-pharmacological measures as appropriate and evaluate response  - Consider cultural and social influences on pain and pain management  - Notify physician/advanced practitioner if interventions unsuccessful or patient reports new pain  Outcome: Progressing     Problem: Knowledge Deficit  Goal: Patient/family/caregiver demonstrates understanding of disease process, treatment plan, medications, and discharge instructions  Description: Complete learning assessment and assess knowledge base.  Interventions:  - Provide teaching at level of understanding  - Provide teaching via preferred learning methods  Outcome: Progressing     Problem: DISCHARGE PLANNING  Goal: Discharge to home or other facility with appropriate resources  Description: INTERVENTIONS:  - Identify barriers to discharge w/patient and caregiver  - Arrange for needed discharge resources and transportation as appropriate  - Identify discharge learning needs (meds, wound care, etc.)  - Arrange for interpretive services to assist at discharge as needed  - Refer to Case Management Department for coordinating discharge planning if the patient needs post-hospital services based on physician/advanced practitioner order or complex needs related to functional status, cognitive ability, or social support system  Outcome: Progressing

## 2024-09-24 NOTE — CONSULTS
Consultation -  [facility]  Mireya Gandhi 49 y.o. female MRN: [id number]  Unit/Bed#: 7T [facility] 704-01 Encounter: [id number]        Inpatient consult to gastroenterology  Consult performed by: Gio Haider PA-C  Consult ordered by: Pedro Pablo Arriaza PA-C      Reason for Consult / Principal Problem:     1.  Intractable vomiting      ASSESSMENT AND PLAN:      1.  Nausea and vomiting  2.  Cannabis use  3.  Generalized abdominal pain  49-year-old female with history of polysubstance use disorder including opioids (on Suboxone therapy), cocaine, and cannabis who presented to hospital on 9/24/2024 with generalized abdominal pain and nausea/vomiting.    -Symptoms are consistent with CHS.  Will start topical capsaicin to be applied to the epigastric/periumbilical region of the abdomen 3 times daily.  -Continue Bentyl 20 mg every 6 hours.  -Continue pantoprazole 40 mg every morning.  -Trial Tigan PRN for nausea/vomiting.  -Advance diet as tolerated.  -Recommend eventual EGD, though this can be performed as an outpatient.    GI will continue following patient    ______________________________________________________________________    HPI: 49-year-old female with history of polysubstance use disorder including opioids (on Suboxone therapy), cocaine, and cannabis who presented to hospital on 9/23/2024 with generalized abdominal pain and nausea/vomiting.    Patient reports sudden onset generalized abdominal pain and nausea/vomiting 2 days ago, which has been improving minimally since onset.  She reports nausea/vomiting has not improved with any medications that have been given to her thus far (hydroxyzine, Haldol, Reglan, Zofran).  Last episode of vomiting several hours ago.  She reports history of episodes similar to this one, usually 1-2 times per year, and last several days at a time.  At home, symptoms are relieved with a hot shower.  She denies daily marijuana use but does admit to using marijuana  prior to admission.  She also endorses chronic abdominal pain and indigestion.    Last EGD 6/12/2020, which showed normal esophagus, mild gastritis, and normal duodenum.  Last colonoscopy 6/12/2020 with 3 mm sessile transverse colon polyp status post polypectomy, grade 1 nonbleeding internal hemorrhoids.    Patient apparently had gastric emptying study in 2019 per chart review, though results are not available for review.      REVIEW OF SYSTEMS:    CONSTITUTIONAL: Denies any fever, chills, rigors, and weight loss.  HEENT: No earache or tinnitus. Denies hearing loss or visual disturbances.  CARDIOVASCULAR: No chest pain or palpitations.   RESPIRATORY: Denies any cough, hemoptysis, shortness of breath or dyspnea on exertion.  GASTROINTESTINAL: As noted in the History of Present Illness.   GENITOURINARY: No problems with urination. Denies any hematuria or dysuria.  NEUROLOGIC: No dizziness or vertigo, denies headaches.   MUSCULOSKELETAL: Denies any muscle or joint pain.   SKIN: Denies skin rashes or itching.   ENDOCRINE: Denies excessive thirst. Denies intolerance to heat or cold.  PSYCHOSOCIAL: Denies depression or anxiety. Denies any recent memory loss.       Historical Information   Past Medical History:   Diagnosis Date    Cardiomyopathy (HCC)     Elevated transaminase measurement     Irritable bowel syndrome (IBS)     with Constipation    Nutritional deficiency     Overactive thyroid gland     Palpitations     Sweating abnormality     Vaginal infection     Vitamin D insufficiency      Past Surgical History:   Procedure Laterality Date    ANKLE SURGERY Left     FINGER SURGERY      TUBAL LIGATION      UPPER GASTROINTESTINAL ENDOSCOPY       Social History   Social History     Substance and Sexual Activity   Alcohol Use Not Currently    Comment: occasionally     Social History     Substance and Sexual Activity   Drug Use Not Currently    Types: Cocaine, Marijuana     Social History     Tobacco Use   Smoking Status  Every Day    Current packs/day: 0.50    Average packs/day: 0.5 packs/day for 30.0 years (15.0 ttl pk-yrs)    Types: Cigarettes   Smokeless Tobacco Never     Family History   Problem Relation Age of Onset    No Known Problems Mother     Diabetes Father     Cancer Father        Meds/Allergies       Medications Prior to Admission:     budesonide-formoterol (SYMBICORT) 80-4.5 MCG/ACT inhaler    buprenorphine-naloxone (Suboxone) 12-3 MG    busPIRone (BUSPAR) 30 MG tablet    CombiPatch 0.05-0.14 MG/DAY    desvenlafaxine (PRISTIQ) 100 mg 24 hr tablet    dicyclomine (BENTYL) 20 mg tablet    esomeprazole (NexIUM) 40 MG capsule    Latuda 40 MG tablet    losartan (COZAAR) 25 mg tablet    vitamin B-12 (VITAMIN B-12) 1,000 mcg tablet    Hydrocortisone/Aloe Max Str 1 % cream    hydrOXYzine pamoate (VISTARIL) 50 mg capsule    prazosin (MINIPRESS) 2 mg capsule    Current Facility-Administered Medications:     acetaminophen (TYLENOL) tablet 650 mg, Q6H PRN    budesonide-formoterol (SYMBICORT) 80-4.5 MCG/ACT inhaler 2 puff, BID    buprenorphine-naloxone (Suboxone) film 12 mg, BID    busPIRone (BUSPAR) tablet 30 mg, BID    cyanocobalamin (VITAMIN B-12) tablet 1,000 mcg, Daily    desvenlafaxine succinate (PRISTIQ) 24 hr tablet 100 mg, Daily    dicyclomine (BENTYL) tablet 20 mg, Q6H    [START ON 9/26/2024] estradiol-norethindrone (COMBIPATCH) 0.05-0.14 MG/DAY 1 patch, Once per day on Monday Thursday    hydrOXYzine HCL (ATARAX) tablet 50 mg, TID    losartan (COZAAR) tablet 25 mg, Daily    lurasidone (LATUDA) tablet 40 mg, Daily With Dinner    nicotine (NICODERM CQ) 14 mg/24hr TD 24 hr patch 1 patch, Daily    pantoprazole (PROTONIX) EC tablet 40 mg, Early Morning    prazosin (MINIPRESS) capsule 2 mg, HS    sodium chloride 0.9 % infusion, Continuous, Last Rate: 125 mL/hr (09/24/24 0626)    trimethobenzamide (TIGAN) IM injection 200 mg, Q6H PRN    Allergies   Allergen Reactions    Gabapentin Anaphylaxis    Hydrocodone Hives    Adhesive  "[Medical Tape] Rash           Objective     Blood pressure 156/96, pulse 60, temperature (!) 97.4 °F (36.3 °C), temperature source Temporal, resp. rate 20, height 5' 9\" (1.753 m), weight 65.8 kg (145 lb 1 oz), SpO2 99%, not currently breastfeeding. Body mass index is 21.42 kg/m².      Intake/Output Summary (Last 24 hours) at 9/24/2024 0805  Last data filed at 9/24/2024 0625  Gross per 24 hour   Intake 2000 ml   Output --   Net 2000 ml         PHYSICAL EXAM:      General Appearance:   Alert, cooperative, no distress   HEENT:   Normocephalic, atraumatic, anicteric.     Neck:  Supple, symmetrical, trachea midline   Abdomen:   Soft, LUQ tenderness, non-distended; normal bowel sounds; no masses, no organomegaly    Genitalia:   Deferred    Rectal:   Deferred    Extremities:  No cyanosis, clubbing or edema    Pulses:  2+ and symmetric all extremities    Skin:  No jaundice, rashes, or lesions    Lymph nodes:  No palpable cervical lymphadenopathy        Lab Results:   Admission on 09/24/2024   Component Date Value    WBC 09/24/2024 12.84 (H)     RBC 09/24/2024 4.64     Hemoglobin 09/24/2024 13.6     Hematocrit 09/24/2024 40.7     MCV 09/24/2024 88     MCH 09/24/2024 29.3     MCHC 09/24/2024 33.4     RDW 09/24/2024 13.1     MPV 09/24/2024 9.5     Platelets 09/24/2024 295     nRBC 09/24/2024 0     Segmented % 09/24/2024 85 (H)     Immature Grans % 09/24/2024 0     Lymphocytes % 09/24/2024 12 (L)     Monocytes % 09/24/2024 3 (L)     Eosinophils Relative 09/24/2024 0     Basophils Relative 09/24/2024 0     Absolute Neutrophils 09/24/2024 10.84 (H)     Absolute Immature Grans 09/24/2024 0.05     Absolute Lymphocytes 09/24/2024 1.50     Absolute Monocytes 09/24/2024 0.38     Eosinophils Absolute 09/24/2024 0.02     Basophils Absolute 09/24/2024 0.05     Sodium 09/24/2024 136     Potassium 09/24/2024 3.4 (L)     Chloride 09/24/2024 102     CO2 09/24/2024 20 (L)     ANION GAP 09/24/2024 14 (H)     BUN 09/24/2024 15     Creatinine " 09/24/2024 0.98     Glucose 09/24/2024 145 (H)     Calcium 09/24/2024 9.5     AST 09/24/2024 16     ALT 09/24/2024 12     Alkaline Phosphatase 09/24/2024 106 (H)     Total Protein 09/24/2024 7.5     Albumin 09/24/2024 4.6     Total Bilirubin 09/24/2024 0.85     eGFR 09/24/2024 68     Lipase 09/24/2024 19     Amph/Meth UR 09/24/2024 Negative     Barbiturate Ur 09/24/2024 Negative     Benzodiazepine Urine 09/24/2024 Negative     Cocaine Urine 09/24/2024 Positive (A)     Methadone Urine 09/24/2024 Negative     Opiate Urine 09/24/2024 Negative     PCP Ur 09/24/2024 Negative     THC Urine 09/24/2024 Positive (A)     Oxycodone Urine 09/24/2024 Negative     Fentanyl Urine 09/24/2024 Negative     HYDROCODONE URINE 09/24/2024 Negative        Imaging Studies: I have personally reviewed pertinent imaging studies.

## 2024-09-24 NOTE — ASSESSMENT & PLAN NOTE
Urine drug screen positive for THC and cocaine  Continue prehospital Suboxone 12 mg sublingual twice daily, dose and frequency confirmed with query of [place] prescription drug monitoring system  Consult case management in a.m.

## 2024-09-24 NOTE — ASSESSMENT & PLAN NOTE
This is a 49-year-old female patient with history of substance abuse, mood disorder, cardiomyopathy who presents with 2-day history of intractable nausea and vomiting  UDS positive for THC and cocaine  Etiology suspected to be CHS versus gastroenteritis versus gastroparesis with reported history of such  Placed in observation medicine  Clear liquid diet  Continue prehospital Bentyl 20 mg p.o. every 6 hours  Substitute Protonix for prehospital Nexium  Give Tigan as needed secondary to prolonged QT  Appreciate GI input, continue with supportive treatment, no plans for invasive testing.

## 2024-09-25 ENCOUNTER — TELEPHONE (OUTPATIENT)
Age: 49
End: 2024-09-25

## 2024-09-25 VITALS
OXYGEN SATURATION: 96 % | HEART RATE: 52 BPM | HEIGHT: 69 IN | DIASTOLIC BLOOD PRESSURE: 60 MMHG | SYSTOLIC BLOOD PRESSURE: 104 MMHG | RESPIRATION RATE: 18 BRPM | WEIGHT: 145.06 LBS | BODY MASS INDEX: 21.49 KG/M2 | TEMPERATURE: 96.6 F

## 2024-09-25 LAB
ALBUMIN SERPL BCG-MCNC: 3.4 G/DL (ref 3.5–5)
ALP SERPL-CCNC: 79 U/L (ref 34–104)
ALT SERPL W P-5'-P-CCNC: 10 U/L (ref 7–52)
ANION GAP SERPL CALCULATED.3IONS-SCNC: 5 MMOL/L (ref 4–13)
AST SERPL W P-5'-P-CCNC: 15 U/L (ref 13–39)
BASOPHILS # BLD AUTO: 0.05 THOUSANDS/ΜL (ref 0–0.1)
BASOPHILS NFR BLD AUTO: 1 % (ref 0–1)
BILIRUB SERPL-MCNC: 0.43 MG/DL (ref 0.2–1)
BUN SERPL-MCNC: 14 MG/DL (ref 5–25)
CALCIUM ALBUM COR SERPL-MCNC: 8.6 MG/DL (ref 8.3–10.1)
CALCIUM SERPL-MCNC: 8.1 MG/DL (ref 8.4–10.2)
CHLORIDE SERPL-SCNC: 110 MMOL/L (ref 96–108)
CO2 SERPL-SCNC: 22 MMOL/L (ref 21–32)
CREAT SERPL-MCNC: 0.76 MG/DL (ref 0.6–1.3)
EOSINOPHIL # BLD AUTO: 0.14 THOUSAND/ΜL (ref 0–0.61)
EOSINOPHIL NFR BLD AUTO: 2 % (ref 0–6)
ERYTHROCYTE [DISTWIDTH] IN BLOOD BY AUTOMATED COUNT: 14 % (ref 11.6–15.1)
GFR SERPL CREATININE-BSD FRML MDRD: 93 ML/MIN/1.73SQ M
GLUCOSE SERPL-MCNC: 108 MG/DL (ref 65–140)
HCT VFR BLD AUTO: 35.2 % (ref 34.8–46.1)
HGB BLD-MCNC: 11.5 G/DL (ref 11.5–15.4)
IMM GRANULOCYTES # BLD AUTO: 0.02 THOUSAND/UL (ref 0–0.2)
IMM GRANULOCYTES NFR BLD AUTO: 0 % (ref 0–2)
LYMPHOCYTES # BLD AUTO: 2.77 THOUSANDS/ΜL (ref 0.6–4.47)
LYMPHOCYTES NFR BLD AUTO: 37 % (ref 14–44)
MAGNESIUM SERPL-MCNC: 1.9 MG/DL (ref 1.9–2.7)
MCH RBC QN AUTO: 30 PG (ref 26.8–34.3)
MCHC RBC AUTO-ENTMCNC: 32.7 G/DL (ref 31.4–37.4)
MCV RBC AUTO: 92 FL (ref 82–98)
MONOCYTES # BLD AUTO: 0.46 THOUSAND/ΜL (ref 0.17–1.22)
MONOCYTES NFR BLD AUTO: 6 % (ref 4–12)
NEUTROPHILS # BLD AUTO: 4.05 THOUSANDS/ΜL (ref 1.85–7.62)
NEUTS SEG NFR BLD AUTO: 54 % (ref 43–75)
NRBC BLD AUTO-RTO: 0 /100 WBCS
PLATELET # BLD AUTO: 207 THOUSANDS/UL (ref 149–390)
PMV BLD AUTO: 9.6 FL (ref 8.9–12.7)
POTASSIUM SERPL-SCNC: 4.2 MMOL/L (ref 3.5–5.3)
PROT SERPL-MCNC: 5.4 G/DL (ref 6.4–8.4)
RBC # BLD AUTO: 3.83 MILLION/UL (ref 3.81–5.12)
SODIUM SERPL-SCNC: 137 MMOL/L (ref 135–147)
WBC # BLD AUTO: 7.49 THOUSAND/UL (ref 4.31–10.16)

## 2024-09-25 PROCEDURE — 99232 SBSQ HOSP IP/OBS MODERATE 35: CPT | Performed by: INTERNAL MEDICINE

## 2024-09-25 PROCEDURE — 85025 COMPLETE CBC W/AUTO DIFF WBC: CPT | Performed by: FAMILY MEDICINE

## 2024-09-25 PROCEDURE — 80053 COMPREHEN METABOLIC PANEL: CPT | Performed by: FAMILY MEDICINE

## 2024-09-25 PROCEDURE — 83735 ASSAY OF MAGNESIUM: CPT | Performed by: FAMILY MEDICINE

## 2024-09-25 RX ORDER — NICOTINE 21 MG/24HR
1 PATCH, TRANSDERMAL 24 HOURS TRANSDERMAL DAILY
Qty: 28 PATCH | Refills: 0 | Status: SHIPPED | OUTPATIENT
Start: 2024-09-26

## 2024-09-25 RX ORDER — HYDROCORTISONE 2.5 %
CREAM (GRAM) TOPICAL 2 TIMES DAILY
Qty: 3.5 G | Refills: 0 | Status: SHIPPED | OUTPATIENT
Start: 2024-09-25

## 2024-09-25 RX ADMIN — BUSPIRONE HYDROCHLORIDE 30 MG: 10 TABLET ORAL at 08:15

## 2024-09-25 RX ADMIN — DICYCLOMINE HYDROCHLORIDE 20 MG: 20 TABLET ORAL at 05:41

## 2024-09-25 RX ADMIN — SODIUM CHLORIDE 125 ML/HR: 0.9 INJECTION, SOLUTION INTRAVENOUS at 04:04

## 2024-09-25 RX ADMIN — HYDROXYZINE HYDROCHLORIDE 50 MG: 50 TABLET, FILM COATED ORAL at 08:15

## 2024-09-25 RX ADMIN — BUDESONIDE AND FORMOTEROL FUMARATE DIHYDRATE 2 PUFF: 80; 4.5 AEROSOL RESPIRATORY (INHALATION) at 08:14

## 2024-09-25 RX ADMIN — BUPRENORPHINE AND NALOXONE 12 MG: 8; 2 FILM BUCCAL; SUBLINGUAL at 08:52

## 2024-09-25 RX ADMIN — CYANOCOBALAMIN TAB 1000 MCG 1000 MCG: 1000 TAB at 08:15

## 2024-09-25 RX ADMIN — PANTOPRAZOLE SODIUM 40 MG: 40 TABLET, DELAYED RELEASE ORAL at 05:41

## 2024-09-25 RX ADMIN — DESVENLAFAXINE 100 MG: 50 TABLET, FILM COATED, EXTENDED RELEASE ORAL at 08:52

## 2024-09-25 NOTE — PLAN OF CARE
Problem: PAIN - ADULT  Goal: Verbalizes/displays adequate comfort level or baseline comfort level  Description: Interventions:  - Encourage patient to monitor pain and request assistance  - Assess pain using appropriate pain scale  - Administer analgesics based on type and severity of pain and evaluate response  - Implement non-pharmacological measures as appropriate and evaluate response  - Consider cultural and social influences on pain and pain management  - Notify physician/advanced practitioner if interventions unsuccessful or patient reports new pain  Outcome: Progressing     Problem: INFECTION - ADULT  Goal: Absence or prevention of progression during hospitalization  Description: INTERVENTIONS:  - Assess and monitor for signs and symptoms of infection  - Monitor lab/diagnostic results  - Monitor all insertion sites, i.e. indwelling lines, tubes, and drains  - Monitor endotracheal if appropriate and nasal secretions for changes in amount and color  - [place] appropriate cooling/warming therapies per order  - Administer medications as ordered  - Instruct and encourage patient and family to use good hand hygiene technique  - Identify and instruct in appropriate isolation precautions for identified infection/condition  Outcome: Progressing     Problem: SAFETY ADULT  Goal: Patient will remain free of falls  Description: INTERVENTIONS:  - Educate patient/family on patient safety including physical limitations  - Instruct patient to call for assistance with activity   - Consult OT/PT to assist with strengthening/mobility   - Keep Call bell within reach  - Keep bed low and locked with side rails adjusted as appropriate  - Keep care items and personal belongings within reach  - Initiate and maintain comfort rounds  - Make Fall Risk Sign visible to staff  - Apply yellow socks and bracelet for high fall risk patients  - Consider moving patient to room near nurses station  Outcome: Progressing  Goal: Maintain or  return to baseline ADL function  Description: INTERVENTIONS:  -  Assess patient's ability to carry out ADLs; assess patient's baseline for ADL function and identify physical deficits which impact ability to perform ADLs (bathing, care of mouth/teeth, toileting, grooming, dressing, etc.)  - Assess/evaluate cause of self-care deficits   - Assess range of motion  - Assess patient's mobility; develop plan if impaired  - Assess patient's need for assistive devices and provide as appropriate  - Encourage maximum independence but intervene and supervise when necessary  - Involve family in performance of ADLs  - Assess for home care needs following discharge   - Consider OT consult to assist with ADL evaluation and planning for discharge  - Provide patient education as appropriate  Outcome: Progressing  Goal: Maintains/Returns to pre admission functional level  Description: INTERVENTIONS:  - Perform AM-PAC 6 Click Basic Mobility/ Daily Activity assessment daily.  - Set and communicate daily mobility goal to care team and patient/family/caregiver.   - Collaborate with rehabilitation services on mobility goals if consulted  -   Problem: DISCHARGE PLANNING  Goal: Discharge to home or other facility with appropriate resources  Description: INTERVENTIONS:  - Identify barriers to discharge w/patient and caregiver  - Arrange for needed discharge resources and transportation as appropriate  - Identify discharge learning needs (meds, wound care, etc.)  - Arrange for interpretive services to assist at discharge as needed  - Refer to Case Management Department for coordinating discharge planning if the patient needs post-hospital services based on physician/advanced practitioner order or complex needs related to functional status, cognitive ability, or social support system  Outcome: Progressing     Problem: Knowledge Deficit  Goal: Patient/family/caregiver demonstrates understanding of disease process, treatment plan, medications,  and discharge instructions  Description: Complete learning assessment and assess knowledge base.  Interventions:  - Provide teaching at level of understanding  - Provide teaching via preferred learning methods  Outcome: Progressing     Problem: GASTROINTESTINAL - ADULT  Goal: Minimal or absence of nausea and/or vomiting  Description: INTERVENTIONS:  - Administer IV fluids if ordered to ensure adequate hydration  - Maintain NPO status until nausea and vomiting are resolved  - Nasogastric tube if ordered  - Administer ordered antiemetic medications as needed  - Provide nonpharmacologic comfort measures as appropriate  - Advance diet as tolerated, if ordered  - Consider nutrition services referral to assist patient with adequate nutrition and appropriate food choices  Outcome: Progressing  Goal: Maintains or returns to baseline bowel function  Description: INTERVENTIONS:  - Assess bowel function  - Encourage oral fluids to ensure adequate hydration  - Administer IV fluids if ordered to ensure adequate hydration  - Administer ordered medications as needed  - Encourage mobilization and activity  - Consider nutritional services referral to assist patient with adequate nutrition and appropriate food choices  Outcome: Progressing  Goal: Maintains adequate nutritional intake  Description: INTERVENTIONS:  - Monitor percentage of each meal consumed  - Identify factors contributing to decreased intake, treat as appropriate  - Assist with meals as needed  - Monitor I&O, weight, and lab values if indicated  - Obtain nutrition services referral as needed  Outcome: Progressing  Goal: Oral mucous membranes remain intact  Description: INTERVENTIONS  - Assess oral mucosa and hygiene practices  - Implement preventative oral hygiene regimen  - Implement oral medicated treatments as ordered  - Initiate Nutrition services referral as needed  Outcome: Progressing   - Out of bed for toileting  - Record patient progress and toleration of  activity level   Outcome: Progressing

## 2024-09-25 NOTE — TELEPHONE ENCOUNTER
Message      Gio Haider PA-C  [facility]  Can you please call patient to schedule a hospital follow-up in the office (any provider) in about 2 weeks?  Thank you so much!    I attempted to reach the patient but her mailbox is full

## 2024-09-25 NOTE — CASE MANAGEMENT
Case Management Discharge Planning Note    Patient name Mireya Gandhi  Location 7T [facility] 704/7T [facility] 704-01 MRN [id number]  : 1975 Date 2024       Current Admission Date: 2024  Current Admission Diagnosis:Intractable nausea and vomiting   Patient Active Problem List    Diagnosis Date Noted Date Diagnosed    Intractable nausea and vomiting 2024     Tobacco abuse 2024     Elevated blood pressure reading 2024     Leukocytosis 2024     Recent bereavement 2024     Opioid use disorder 2023     PTSD (post-traumatic stress disorder) 2023     Risk taking behavior 2023     Polysubstance use disorder 2021     Hyperthyroidism 2021     Mood disorder (HCC) 2021     History of cardiomyopathy 2021     Abnormal ECG 2021     Acquired QT prolongation 2021     Hypokalemia 2020       LOS (days): 0  Geometric Mean LOS (GMLOS) (days):   Days to GMLOS:     OBJECTIVE:            Current admission status: Observation   Preferred Pharmacy:   [facility] [address]  [address] [place] [id number]  Phone: [phone] Fax: [phone]    Primary Care Provider: Apolonia Burgos    Primary Insurance: [employer]  REP  Secondary Insurance: [facility]    DISCHARGE DETAILS:    Discharge planning discussed with:: Patient      Treatment Team Recommendation: Home  Discharge Destination Plan:: Home  Transport at Discharge : Other (Comment) (LYFT)  Dispatcher Contacted: Yes  Number/Name of Dispatcher: Via [employer]       Additional Comments: Added [facility] Area on aging to S for MOW.  Met with patient bedside and LYFT waiver signed and provided to media.  Needs LYFT to [address]

## 2024-09-25 NOTE — PLAN OF CARE
Problem: PAIN - ADULT  Goal: Verbalizes/displays adequate comfort level or baseline comfort level  Description: Interventions:  - Encourage patient to monitor pain and request assistance  - Assess pain using appropriate pain scale  - Administer analgesics based on type and severity of pain and evaluate response  - Implement non-pharmacological measures as appropriate and evaluate response  - Consider cultural and social influences on pain and pain management  - Notify physician/advanced practitioner if interventions unsuccessful or patient reports new pain  Outcome: Progressing     Problem: INFECTION - ADULT  Goal: Absence or prevention of progression during hospitalization  Description: INTERVENTIONS:  - Assess and monitor for signs and symptoms of infection  - Monitor lab/diagnostic results  - Monitor all insertion sites, i.e. indwelling lines, tubes, and drains  - Monitor endotracheal if appropriate and nasal secretions for changes in amount and color  - [place] appropriate cooling/warming therapies per order  - Administer medications as ordered  - Instruct and encourage patient and family to use good hand hygiene technique  - Identify and instruct in appropriate isolation precautions for identified infection/condition  Outcome: Progressing     Problem: GASTROINTESTINAL - ADULT  Goal: Minimal or absence of nausea and/or vomiting  Description: INTERVENTIONS:  - Administer IV fluids if ordered to ensure adequate hydration  - Maintain NPO status until nausea and vomiting are resolved  - Nasogastric tube if ordered  - Administer ordered antiemetic medications as needed  - Provide nonpharmacologic comfort measures as appropriate  - Advance diet as tolerated, if ordered  - Consider nutrition services referral to assist patient with adequate nutrition and appropriate food choices  Outcome: Progressing     Problem: GASTROINTESTINAL - ADULT  Goal: Maintains adequate nutritional intake  Description: INTERVENTIONS:  -  Monitor percentage of each meal consumed  - Identify factors contributing to decreased intake, treat as appropriate  - Assist with meals as needed  - Monitor I&O, weight, and lab values if indicated  - Obtain nutrition services referral as needed  Outcome: Progressing

## 2024-09-25 NOTE — PROGRESS NOTES
Progress Note- Mireya Gandhi 49 y.o. female MRN: [id number]    Unit/Bed#: 7T [facility] 704-01 Encounter: [id number]      Assessment and Plan:    1.  Nausea and vomiting  2.  Cannabis use  3.  Generalized abdominal pain  49-year-old female with history of polysubstance use disorder including opioids (on Suboxone therapy), cocaine, and cannabis who presented to hospital on 9/24/2024 with generalized abdominal pain and nausea/vomiting.  Suspect CHS.     -Continue Bentyl 20 mg every 6 hours  -Continue pantoprazole 40 mg every morning  -Continue regular diet  -Recommend cessation of marijuana  -Recommend outpatient follow-up and EGD    GI will sign off.  OK for discharge from GI perspective    ______________________________________________________________________    Subjective:  Patting sitting up in bed at time of exam.  She ate 100% of breakfast this AM and reports nausea/vomiting has fully resolved.    Medication Administration - last 24 hours from 09/24/2024 0849 to 09/25/2024 0849         Date/Time Order Dose Route Action Action by     09/25/2024 0814 EDT budesonide-formoterol (SYMBICORT) 80-4.5 MCG/ACT inhaler 2 puff 2 puff Inhalation Given Smith Cooper RN     09/24/2024 1725 EDT budesonide-formoterol (SYMBICORT) 80-4.5 MCG/ACT inhaler 2 puff 2 puff Inhalation Given Tianna Galvez RN     09/25/2024 0815 EDT busPIRone (BUSPAR) tablet 30 mg 30 mg Oral Given Smith Cooper RN     09/24/2024 1725 EDT busPIRone (BUSPAR) tablet 30 mg 30 mg Oral Given Tianna Galvez RN     09/25/2024 0541 EDT dicyclomine (BENTYL) tablet 20 mg 20 mg Oral Given Lauren Garcia RN     09/25/2024 0037 EDT dicyclomine (BENTYL) tablet 20 mg 20 mg Oral Not Given Lauren Garcia, RICHARD     09/24/2024 1725 EDT dicyclomine (BENTYL) tablet 20 mg 20 mg Oral Given Tianna Galvez RN     09/24/2024 1135 EDT dicyclomine (BENTYL) tablet 20 mg 20 mg Oral Given Tianna Galvez RN     09/25/2024 0541 EDT pantoprazole (PROTONIX) EC tablet 40 mg 40  "mg Oral Given Lauren Garcia RN     09/25/2024 0815 EDT hydrOXYzine HCL (ATARAX) tablet 50 mg 50 mg Oral Given Smith Cooper RN     09/24/2024 2152 EDT hydrOXYzine HCL (ATARAX) tablet 50 mg 50 mg Oral Given Lauren Garcia RN     09/24/2024 1637 EDT hydrOXYzine HCL (ATARAX) tablet 50 mg 50 mg Oral Given Tianna Galvez RN     09/24/2024 1638 EDT lurasidone (LATUDA) tablet 40 mg 40 mg Oral Given Tianna Galvez RN     09/25/2024 0818 EDT losartan (COZAAR) tablet 25 mg 25 mg Oral Not Given Smith Cooper RN     09/24/2024 2153 EDT prazosin (MINIPRESS) capsule 2 mg 2 mg Oral Not Given Luaren Garcia RN     09/25/2024 0815 EDT cyanocobalamin (VITAMIN B-12) tablet 1,000 mcg 1,000 mcg Oral Given Smith Cooper RN     09/25/2024 0404 EDT sodium chloride 0.9 % infusion 125 mL/hr Intravenous New Bag Lauren Garcia, RICHARD     09/24/2024 2014 EDT sodium chloride 0.9 % infusion 125 mL/hr Intravenous New Bag Lauren Garcia RN     09/25/2024 0815 EDT nicotine (NICODERM CQ) 14 mg/24hr TD 24 hr patch 1 patch 1 patch Transdermal Not Given Smith Cooper RN     09/24/2024 0929 EDT trimethobenzamide (TIGAN) IM injection 200 mg 200 mg Intramuscular Given Tianna Galvez RN     09/24/2024 2152 EDT buprenorphine-naloxone (Suboxone) film 12 mg 12 mg Sublingual Given Lauren Garcia RN     09/25/2024 0818 EDT capsaicin (ZOSTRIX) 0.025 % cream -- Topical Not Given Smith Cooper RN     09/24/2024 2151 EDT capsaicin (ZOSTRIX) 0.025 % cream -- Topical Not Given Lauren Garcia RN     09/24/2024 1638 EDT capsaicin (ZOSTRIX) 0.025 % cream -- Topical Not Given Tianna Galvez RN     09/24/2024 1135 EDT capsaicin (ZOSTRIX) 0.025 % cream -- Topical Given Tianna Galvez RN            Objective:     Vitals: Blood pressure 104/60, pulse (!) 52, temperature (!) 96.6 °F (35.9 °C), temperature source Temporal, resp. rate 18, height 5' 9\" (1.753 m), weight 65.8 kg (145 lb 1 oz), SpO2 96%, not currently breastfeeding.,Body mass index is " 21.42 kg/m².      Intake/Output Summary (Last 24 hours) at 9/25/2024 0849  Last data filed at 9/25/2024 0404  Gross per 24 hour   Intake 3644.17 ml   Output --   Net 3644.17 ml       Physical Exam:   General Appearance: Awake and alert, in no acute distress  Abdomen: Soft, non-tender, non-distended; bowel sounds normal; no masses or no organomegaly    Invasive Devices       Peripheral Intravenous Line  Duration             Peripheral IV 09/24/24 Distal;Left;Ventral (anterior) Forearm 1 day                    Lab Results:  Admission on 09/24/2024   Component Date Value    WBC 09/24/2024 12.84 (H)     RBC 09/24/2024 4.64     Hemoglobin 09/24/2024 13.6     Hematocrit 09/24/2024 40.7     MCV 09/24/2024 88     MCH 09/24/2024 29.3     MCHC 09/24/2024 33.4     RDW 09/24/2024 13.1     MPV 09/24/2024 9.5     Platelets 09/24/2024 295     nRBC 09/24/2024 0     Segmented % 09/24/2024 85 (H)     Immature Grans % 09/24/2024 0     Lymphocytes % 09/24/2024 12 (L)     Monocytes % 09/24/2024 3 (L)     Eosinophils Relative 09/24/2024 0     Basophils Relative 09/24/2024 0     Absolute Neutrophils 09/24/2024 10.84 (H)     Absolute Immature Grans 09/24/2024 0.05     Absolute Lymphocytes 09/24/2024 1.50     Absolute Monocytes 09/24/2024 0.38     Eosinophils Absolute 09/24/2024 0.02     Basophils Absolute 09/24/2024 0.05     Sodium 09/24/2024 136     Potassium 09/24/2024 3.4 (L)     Chloride 09/24/2024 102     CO2 09/24/2024 20 (L)     ANION GAP 09/24/2024 14 (H)     BUN 09/24/2024 15     Creatinine 09/24/2024 0.98     Glucose 09/24/2024 145 (H)     Calcium 09/24/2024 9.5     AST 09/24/2024 16     ALT 09/24/2024 12     Alkaline Phosphatase 09/24/2024 106 (H)     Total Protein 09/24/2024 7.5     Albumin 09/24/2024 4.6     Total Bilirubin 09/24/2024 0.85     eGFR 09/24/2024 68     Lipase 09/24/2024 19     Amph/Meth UR 09/24/2024 Negative     Barbiturate Ur 09/24/2024 Negative     Benzodiazepine Urine 09/24/2024 Negative     Cocaine Urine  09/24/2024 Positive (A)     Methadone Urine 09/24/2024 Negative     Opiate Urine 09/24/2024 Negative     PCP Ur 09/24/2024 Negative     THC Urine 09/24/2024 Positive (A)     Oxycodone Urine 09/24/2024 Negative     Fentanyl Urine 09/24/2024 Negative     HYDROCODONE URINE 09/24/2024 Negative     Magnesium 09/25/2024 1.9     WBC 09/25/2024 7.49     RBC 09/25/2024 3.83     Hemoglobin 09/25/2024 11.5     Hematocrit 09/25/2024 35.2     MCV 09/25/2024 92     MCH 09/25/2024 30.0     MCHC 09/25/2024 32.7     RDW 09/25/2024 14.0     MPV 09/25/2024 9.6     Platelets 09/25/2024 207     nRBC 09/25/2024 0     Segmented % 09/25/2024 54     Immature Grans % 09/25/2024 0     Lymphocytes % 09/25/2024 37     Monocytes % 09/25/2024 6     Eosinophils Relative 09/25/2024 2     Basophils Relative 09/25/2024 1     Absolute Neutrophils 09/25/2024 4.05     Absolute Immature Grans 09/25/2024 0.02     Absolute Lymphocytes 09/25/2024 2.77     Absolute Monocytes 09/25/2024 0.46     Eosinophils Absolute 09/25/2024 0.14     Basophils Absolute 09/25/2024 0.05     Sodium 09/25/2024 137     Potassium 09/25/2024 4.2     Chloride 09/25/2024 110 (H)     CO2 09/25/2024 22     ANION GAP 09/25/2024 5     BUN 09/25/2024 14     Creatinine 09/25/2024 0.76     Glucose 09/25/2024 108     Calcium 09/25/2024 8.1 (L)     Corrected Calcium 09/25/2024 8.6     AST 09/25/2024 15     ALT 09/25/2024 10     Alkaline Phosphatase 09/25/2024 79     Total Protein 09/25/2024 5.4 (L)     Albumin 09/25/2024 3.4 (L)     Total Bilirubin 09/25/2024 0.43     eGFR 09/25/2024 93        Imaging Studies: I have personally reviewed pertinent imaging studies.

## 2024-09-30 ENCOUNTER — TELEPHONE (OUTPATIENT)
Dept: PSYCHIATRY | Facility: CLINIC | Age: 49
End: 2024-09-30

## 2024-09-30 ENCOUNTER — TELEPHONE (OUTPATIENT)
Dept: OTHER | Age: 49
End: 2024-09-30

## 2024-09-30 NOTE — TELEPHONE ENCOUNTER
Mireya called back asking if she had an appt today with TONEY Velasquez Provider. Advised Mireya that her appt is 10/30/24. Mireya was very thankful.

## 2024-09-30 NOTE — TELEPHONE ENCOUNTER
Mireya called back requesting that Leti MCKENZIE CM, call her back at [phone].    For your review.

## 2024-09-30 NOTE — TELEPHONE ENCOUNTER
Mireya appeared to have left a VM on the MAT line on 9/26/24 requesting a callback. Called back Mireya and left a VM. [facility] office number provided.

## 2024-10-07 ENCOUNTER — TELEPHONE (OUTPATIENT)
Dept: OTHER | Age: 49
End: 2024-10-07

## 2024-10-07 NOTE — TELEPHONE ENCOUNTER
Mireya called to cancel/reschedule her appt today with Leti MCKENZIE CM, due to her being sick. Rescheduled for 10/9/24 at 3 pm.     For your review.

## 2024-10-15 ENCOUNTER — SOCIAL WORK (OUTPATIENT)
Dept: PSYCHIATRY | Facility: CLINIC | Age: 49
End: 2024-10-15
Payer: COMMERCIAL

## 2024-10-15 ENCOUNTER — OFFICE VISIT (OUTPATIENT)
Dept: PSYCHIATRY | Facility: CLINIC | Age: 49
End: 2024-10-15
Payer: COMMERCIAL

## 2024-10-15 DIAGNOSIS — F39 MOOD DISORDER (HCC): Primary | ICD-10-CM

## 2024-10-15 DIAGNOSIS — F43.10 PTSD (POST-TRAUMATIC STRESS DISORDER): ICD-10-CM

## 2024-10-15 DIAGNOSIS — F11.90 OPIOID USE DISORDER: ICD-10-CM

## 2024-10-15 DIAGNOSIS — F19.90 POLYSUBSTANCE USE DISORDER: ICD-10-CM

## 2024-10-15 DIAGNOSIS — R46.89 RISK TAKING BEHAVIOR: ICD-10-CM

## 2024-10-15 DIAGNOSIS — F19.90 POLYSUBSTANCE USE DISORDER: Primary | ICD-10-CM

## 2024-10-15 PROCEDURE — H0006 ALCOHOL AND/OR DRUG SERVICES: HCPCS

## 2024-10-15 PROCEDURE — 90834 PSYTX W PT 45 MINUTES: CPT | Performed by: COUNSELOR

## 2024-10-15 NOTE — BH TREATMENT PLAN
Outpatient Behavioral Health Psychotherapy Treatment Plan    Mireya Gandhi  1975     Date of Initial Psychotherapy Assessment: 11/06/2024   Date of Current Treatment Plan: 10/15/24  Treatment Plan Target Date: 3/15/2025  Treatment Plan Expiration Date: 3/15/2025    Diagnosis:   1. Mood disorder (HCC)        2. Opioid use disorder        3. Polysubstance use disorder        4. PTSD (post-traumatic stress disorder)        5. Risk taking behavior            Area(s) of Need: mood issues. Cocaine use, recent passing of Significant other    Long Term Goal 1 (in the client's own words): Learning to deal with my feelings in a healthy way    Stage of Change: Action    Target Date for completion: 3/15/2025     Anticipated therapeutic modalities: CBT, supportive counseling     People identified to complete this goal: sadi Gomes, monthly therapy      Objective 1: (identify the means of measuring success in meeting the objective): Be able to identify my feelings and how they are affecting me      Objective 2: (identify the means of measuring success in meeting the objective): Have learned healthy coping skills and take my medications      Long Term Goal 2 (in the client's own words): I have been off opiates for some time but I want to stop smoking crack.     Stage of Change: Contemplation    Target Date for completion: 3/15/2025     Anticipated therapeutic modalities: motivational interviewing, CBT/Relapse prevention, monthly therapy     People identified to complete this goal: sadi Gomes, [facility] provider      Objective 1: (identify the means of measuring success in meeting the objective): I will be honest about my crack use and learn my triggers      Objective 2: (identify the means of measuring success in meeting the objective): I will learn healthy coping skills that keep me from using crack     Long Term Goal 3 (in the client's own words): I want to move to [place] to be near my support  system    Stage of Change: Action    Target Date for completion: 3/15/2025     Anticipated therapeutic modalities: CBT, organizing, monthly therapy     People identified to complete this goal: sadi Gomes      Objective 1: (identify the means of measuring success in meeting the objective): I will have identified a place to live and will have visited at least once before moving      Objective 2: (identify the means of measuring success in meeting the objective): I will have identified medical providers for my medication needs and have a plan to continue to receive my life sustaining medications     I am currently under the care of a [facility] psychiatric provider: no    My [facility] psychiatric provider is: n/a    I am currently taking psychiatric medications: Yes, as prescribed    I feel that I will be ready for discharge from mental health care when I reach the following (measurable goal/objective): I will have stopped my substance abuse for at least one year and will have at least 5 healthy skills to handle my distressful moods.    For children and adults who have a legal guardian:   Has there been any change to custody orders and/or guardianship status? NA. If yes, attach updated documentation.    I have created my Crisis Plan and have been offered a copy of this plan    Behavioral Health Treatment Plan [facility]: Diagnosis and Treatment Plan explained to Mireya Gandhi acknowledges an understanding of their diagnosis. Mireya Gandhi agrees to this treatment plan.    I have been offered a copy of this Treatment Plan. yes

## 2024-10-15 NOTE — PSYCH
"Behavioral Health Psychotherapy Progress Note    Psychotherapy Provided: Individual Psychotherapy     1. Mood disorder (HCC)        2. Opioid use disorder        3. Polysubstance use disorder        4. PTSD (post-traumatic stress disorder)        5. Risk taking behavior            Goals addressed in session: Goal 1, Goal 2, and Goal 3      DATA: Mireya was seen for therapy today. Mireya was late for appointment but indicated she wanted to meet. Mireya stated she wanted to discuss her decision to move from the area to [facility] to be closer to her support system. Mireya reports she has moved from her apartment to a rooming house at this time. Mireya stated she felt safe at this rooming house but was lonely due to not having any supports. Mireya and therapist discussed what it would take for Mireya to move and the arrangements she would need to make. Mireya and therapist reviewed and revised her treatment plan to include planning for this move. This treatment plan was signed. Mireya reports an overall decrease in her hallucinations, stating she was told when she went to the hospital she had a \"B vitamin deficiency\".  Mireya reports medication adherence.  Mireya was scheduled for a session in 2 weeks.   During this session, this clinician used the following therapeutic modalities: Client-centered Therapy, Cognitive Behavioral Therapy, Motivational Interviewing, and Supportive Psychotherapy    Substance Abuse was addressed during this session. If the client is diagnosed with a co-occurring substance use disorder, please indicate any changes in the frequency or amount of use: Mireya reports continued crack cocaine use, about 3X weekly.  Therapist utilized stage appropriate interventions to address this substance abuse issue.   Mireya and therapist discussed Mireya's goal for her use and Mireya maintained she wanted to stop this use prior to moving, stating she would be embarrassed if " "her father knew she was using crack cocaine.  Mireya and therapist discussed options for Mireya to achieve this goal, including inpatient treatment. Mireya indicated she wanted to try to stop in the community and was agreeable to attending 12 step meetings. Mireya received a 12 step meeting schedule and specific meetings near her home were identified. Stage of change for addressing substance use diagnoses: Preparation    ASSESSMENT:  Mireya Gandhi presents with a Euthymic/ normal mood. Mireya presented a more stable than in the past and demonstrated less mood instability. Mireya was engaged in therapy.      her affect is Normal range and intensity, which is congruent, with her mood and the content of the session. The client has made progress on their goals.     Mireya Gandhi presents with a none risk of suicide, none risk of self-harm, and none risk of harm to others.    For any risk assessment that surpasses a \"low\" rating, a safety plan must be developed.    A safety plan was indicated: no  If yes, describe in detail n/a    PLAN: Between sessions, Mireya Gandhi will begin attending 12 step meetings. At the next session, the therapist will use Client-centered Therapy, Cognitive Behavioral Therapy, Motivational Interviewing, and Supportive Psychotherapy to address Mireya's struggles with organizing and planning for her move and developing an effective plan to address her crack cocaine usage.    Behavioral Health Treatment Plan and Discharge Planning: Mireya Gandhi is aware of and agrees to continue to work on their treatment plan. They have identified and are working toward their discharge goals. yes    Visit start and stop times:    10/16/24  Start Time: 1315  Stop Time: 1400  Total Visit Time: 45 minutes  "

## 2024-10-21 ENCOUNTER — OFFICE VISIT (OUTPATIENT)
Dept: PSYCHIATRY | Facility: CLINIC | Age: 49
End: 2024-10-21
Payer: COMMERCIAL

## 2024-10-21 DIAGNOSIS — F19.90 POLYSUBSTANCE USE DISORDER: Primary | ICD-10-CM

## 2024-10-21 PROCEDURE — H0006 ALCOHOL AND/OR DRUG SERVICES: HCPCS

## 2024-10-22 ENCOUNTER — TELEPHONE (OUTPATIENT)
Dept: OTHER | Age: 49
End: 2024-10-22

## 2024-10-22 NOTE — PSYCH
Mireya Gandhi  10/15/2024  Start Time: 1400  Stop Time: 1415  Total Visit Time: 15 minutes    Visit type: In person    Recovery needs addressed during session: Living & Financial [place]    Notes (DAP Format)  DATA: Patient presented for in-person, scheduled appointment with this CM following session with Psychotherapist.     Patient has request this CM to assist to writing a letter to advises an assistance program that patient is  from  whom patient has not been with since the early 90s.  Patient and this CM were able to complete this letter via WORD to which patient plans to get notarized.    ASSESSMENT: Patient did present alert and oriented during this session, patient did present hyper, but does display this type of behavior regularly.   Patient did not present as a concern to this CM, low risk SI/HI    PLAN: Patient would like to see this CM upon notarizing letter in order to have letter faxed and additional referrals made as patient would like to continue working towards stable housing somewhere outside of the surrounding counties.     Referrals made during this visit: no formal referrals made during this session     Recovery connections: Psychotherapy    Next appointment: one week

## 2024-10-23 NOTE — PSYCH
Mireya Gandhi  10/23/24  Start Time: 1305  Stop Time: 1331  Total Visit Time: 26 minutes    Visit type: In person    Recovery needs addressed during session: Basic Needs and Living & Financial [place]    Notes (DAP Format)  DATA: Patient presented for in-person, scheduled visit with this CM.     Patient has not yet been able to notarize letter needed for assistance, but did mention to this CM the desire to keep appointment and hold self accountable for appointments in the future. This CM was grateful.   Patient does have an appointment at dentist office to have tooth extraction due to dental infection to which this CM felt it was more important for patient to attempt to get to that appointment early which patient agreed if there was not progress made outside of this session.     ASSESSMENT: Patient presented well groomed, alert and oriented.   Patient did express frustration during beginning of appointment as patient was going to pay for storage unit and found to have misplaced $200 that patient had to borrow in order to not lose storage unit.   Patient and this CM discussed if patient had anywhere else patient could store furniture at this time to which patient did not, former nikole was the one who helped patient move furniture and felt to have taken advantage of patient.   Patient did not present as a risk to this CM, low risk SI/HI    PLAN: Patient does have a follow up appt with Psychotherapist next week, will follow up with this CM as well.     Referrals made during this visit: no formal referrals made during this session     Recovery connections: Psychotherapy, MAT     Next appointment: one week

## 2024-10-28 ENCOUNTER — TELEPHONE (OUTPATIENT)
Dept: OTHER | Age: 49
End: 2024-10-28

## 2024-10-28 NOTE — TELEPHONE ENCOUNTER
Upon calling Mireya to confirm her 2 pm appt today with sadi Tillman, Mireya states that she must cancel due to a conflicting appt. Mireya rescheduled for 11/4/24 at 2 pm.    For your review.

## 2024-10-30 ENCOUNTER — OFFICE VISIT (OUTPATIENT)
Dept: OTHER | Age: 49
End: 2024-10-30
Payer: MEDICARE

## 2024-10-30 VITALS
WEIGHT: 151 LBS | DIASTOLIC BLOOD PRESSURE: 74 MMHG | SYSTOLIC BLOOD PRESSURE: 134 MMHG | BODY MASS INDEX: 22.36 KG/M2 | HEART RATE: 64 BPM | HEIGHT: 69 IN

## 2024-10-30 DIAGNOSIS — F11.90 OPIOID USE DISORDER: ICD-10-CM

## 2024-10-30 PROCEDURE — 99213 OFFICE O/P EST LOW 20 MIN: CPT

## 2024-10-30 RX ORDER — BUPRENORPHINE AND NALOXONE 12; 3 MG/1; MG/1
12 FILM, SOLUBLE BUCCAL; SUBLINGUAL 2 TIMES DAILY
Qty: 60 FILM | Refills: 1 | Status: SHIPPED | OUTPATIENT
Start: 2024-10-30 | End: 2024-10-30

## 2024-10-30 RX ORDER — BUPRENORPHINE AND NALOXONE 12; 3 MG/1; MG/1
12 FILM, SOLUBLE BUCCAL; SUBLINGUAL 2 TIMES DAILY
Qty: 60 FILM | Refills: 1 | Status: SHIPPED | OUTPATIENT
Start: 2024-10-30 | End: 2024-12-29

## 2024-10-30 NOTE — PROGRESS NOTES
[facility]    Progress Note  Mireya Gandhi 49 y.o. female MRN: [id number]  @ Encounter: [id number]      1. Opioid use disorder  Assessment & Plan:  Patient seen for a follow up appointment; doing well from [facility]OU[facility] standpoing   Currently taking 12 mg BID   PDMP reviewed   Prescription sent to preferred pharmacy   Patient encouraged to continue with share psychotherapy   F/U in 2 months   Orders:  -     buprenorphine-naloxone (Suboxone) 12-3 MG; Place 1 Film (12 mg total) under the tongue 2 (two) times a day        Support Services  Case Management and Certified  are following.   Patient was referred to the [facility] Certified Addiction Counselors: No  The patient is actively engaged with the [facility] Certified Addiction Counselor’s psychotherapy plan: Yes    buprenorphine-naloxone      PDMP reviewed:     PDMP Review         Value Time User    PDMP Reviewed  Yes 10/30/2024  3:14 PM Brigette Davis PA-C            SUBJECTIVE  Patient states that she is doing well. Denies any cravings. Wishes to continue on current dosing    Drug cravings: denies   Motivation to continue treatment: high       Current Outpatient Medications:     budesonide-formoterol (SYMBICORT) 80-4.5 MCG/ACT inhaler, Inhale 2 puffs 2 (two) times a day, Disp: , Rfl:     buprenorphine-naloxone (Suboxone) 12-3 MG, Place 1 Film (12 mg total) under the tongue 2 (two) times a day, Disp: 60 Film, Rfl: 1    busPIRone (BUSPAR) 30 MG tablet, Take 30 mg by mouth 2 (two) times a day, Disp: , Rfl:     CombiPatch 0.05-0.14 MG/DAY, Place 1 patch on the skin 2 (two) times a week, Disp: , Rfl:     desvenlafaxine (PRISTIQ) 100 mg 24 hr tablet, Take 100 mg by mouth daily, Disp: , Rfl:     dicyclomine (BENTYL) 20 mg tablet, Take 20 mg by mouth every 6 (six) hours, Disp: , Rfl:     esomeprazole (NexIUM) 40 MG capsule, Take 40 mg by mouth, Disp: , Rfl:     hydrocortisone 2.5 % cream, Apply topically 2 (two) times a day Bilateral  hands, Disp: 3.5 g, Rfl: 0    Latuda 40 MG tablet, Take 40 mg by mouth daily with dinner, Disp: , Rfl:     losartan (COZAAR) 25 mg tablet, Take 25 mg by mouth daily, Disp: , Rfl:     vitamin B-12 (VITAMIN B-12) 1,000 mcg tablet, Take 1,000 mcg by mouth daily, Disp: , Rfl:     hydrOXYzine pamoate (VISTARIL) 50 mg capsule, Take 1 capsule (50 mg total) by mouth 3 (three) times a day, Disp: 90 capsule, Rfl: 1    nicotine (NICODERM CQ) 14 mg/24hr TD 24 hr patch, Place 1 patch on the skin over 24 hours daily Do not start before September 26, 2024., Disp: 28 patch, Rfl: 0    prazosin (MINIPRESS) 2 mg capsule, Take 1 capsule (2 mg total) by mouth daily at bedtime, Disp: 30 capsule, Rfl: 0    Objective     Vitals:    10/30/24 1507   BP: 134/74   Pulse: 64       Physical Exam  Vitals reviewed.   Constitutional:       General: She is not in acute distress.     Appearance: She is not diaphoretic.   Neurological:      Mental Status: She is alert.   Psychiatric:         Mood and Affect: Mood is not anxious or depressed.              Lab Results:   Results from last 6 Months   Lab Units 09/25/24  0708   WBC Thousand/uL 7.49   HEMOGLOBIN g/dL 11.5   HEMATOCRIT % 35.2   PLATELETS Thousands/uL 207      Results from last 6 Months   Lab Units 09/25/24  0708   POTASSIUM mmol/L 4.2   CHLORIDE mmol/L 110*   CO2 mmol/L 22   BUN mg/dL 14   CREATININE mg/dL 0.76   CALCIUM mg/dL 8.1*   ALBUMIN g/dL 3.4*   ALK PHOS U/L 79   ALT U/L 10   AST U/L 15                         Counseling / Coordination of Care  Total time spent today 15 minutes. Greater than 50% of total time was spent with the patient and / or family counseling and / or coordination of care.     Brigette Davis PA-C

## 2024-10-30 NOTE — ASSESSMENT & PLAN NOTE
Patient seen for a follow up appointment; doing well from [facility] blas   Currently taking 12 mg BID   PDMP reviewed   Prescription sent to preferred pharmacy   Patient encouraged to continue with share psychotherapy   F/U in 2 months

## 2024-12-09 ENCOUNTER — SOCIAL WORK (OUTPATIENT)
Dept: PSYCHIATRY | Facility: CLINIC | Age: 49
End: 2024-12-09
Payer: COMMERCIAL

## 2024-12-09 ENCOUNTER — OFFICE VISIT (OUTPATIENT)
Dept: DENTISTRY | Facility: CLINIC | Age: 49
End: 2024-12-09

## 2024-12-09 VITALS — SYSTOLIC BLOOD PRESSURE: 138 MMHG | DIASTOLIC BLOOD PRESSURE: 86 MMHG | HEART RATE: 60 BPM

## 2024-12-09 DIAGNOSIS — K08.9 EXTRACTION OF TOOTH NEEDED: ICD-10-CM

## 2024-12-09 DIAGNOSIS — F19.90 POLYSUBSTANCE USE DISORDER: Primary | ICD-10-CM

## 2024-12-09 DIAGNOSIS — K08.89 PAIN, DENTAL: Primary | ICD-10-CM

## 2024-12-09 DIAGNOSIS — F11.90 OPIOID USE DISORDER: ICD-10-CM

## 2024-12-09 DIAGNOSIS — R46.89 RISK TAKING BEHAVIOR: ICD-10-CM

## 2024-12-09 DIAGNOSIS — F39 MOOD DISORDER (HCC): ICD-10-CM

## 2024-12-09 DIAGNOSIS — K04.7 DENTAL ABSCESS: ICD-10-CM

## 2024-12-09 DIAGNOSIS — F43.10 PTSD (POST-TRAUMATIC STRESS DISORDER): ICD-10-CM

## 2024-12-09 PROCEDURE — D0220 INTRAORAL - PERIAPICAL FIRST RADIOGRAPHIC IMAGE: HCPCS | Performed by: DENTIST

## 2024-12-09 PROCEDURE — D0270 BITEWING - SINGLE RADIOGRAPHIC IMAGE: HCPCS | Performed by: DENTIST

## 2024-12-09 PROCEDURE — 90834 PSYTX W PT 45 MINUTES: CPT | Performed by: COUNSELOR

## 2024-12-09 PROCEDURE — D0140 LIMITED ORAL EVALUATION - PROBLEM FOCUSED: HCPCS | Performed by: DENTIST

## 2024-12-09 RX ORDER — IBUPROFEN 800 MG/1
800 TABLET, FILM COATED ORAL EVERY 6 HOURS PRN
Qty: 30 TABLET | Refills: 0 | Status: SHIPPED | OUTPATIENT
Start: 2024-12-09

## 2024-12-09 RX ORDER — AMOXICILLIN 500 MG/1
500 CAPSULE ORAL EVERY 8 HOURS SCHEDULED
Qty: 21 CAPSULE | Refills: 0 | Status: SHIPPED | OUTPATIENT
Start: 2024-12-09 | End: 2024-12-16

## 2024-12-09 NOTE — PSYCH
Behavioral Health Psychotherapy Progress Note    Psychotherapy Provided: Individual Psychotherapy     1. Polysubstance use disorder        2. Mood disorder (HCC)        3. PTSD (post-traumatic stress disorder)        4. Risk taking behavior        5. Opioid use disorder            Goals addressed in session: Goal 1, Goal 2, and Goal 3      DATA: Mireya was seen for therapy today. Mireya reports ongoing distress due to her housing, financial issues, medical issues, and mental health. Mireya and therapist discussed concrete measures Mireya could address her needs. Mireya struggled with focus but was able to discuss these concrete plans. Mireya was provided with a medication lockbox after indicating she believed her landlord was stealing her medications. Mireya was encouraged not to make any impulsive decisions and to mindful in her planning. Therapist was able to assist Mireya in scheduling a dental appointment due to a tooth pain. Mireya was appreciative of the help. Mireya was scheduled for a session in 1 week.   During this session, this clinician used the following therapeutic modalities: Client-centered Therapy, Cognitive Behavioral Therapy, Motivational Interviewing, and Supportive Psychotherapy    Substance Abuse was addressed during this session. If the client is diagnosed with a co-occurring substance use disorder, please indicate any changes in the frequency or amount of use: Mireya reports she continues to smoke crack cocaine regularly.  Therapist utilized stage appropriate interventions to address this substance abuse issue.  Mireya and therapist discussed the effects of this use on Mireya's mental health and Mireya indicated she is aware that this may be causing her to have memory issues. Mireya stated she feels that her crack had changed recently because she had been falling asleep after using. Therapist discussed with Mireya the possibility of fentanyl entering her crack  "and advised Mireya to test her crack for fentanyl. Therapist provided Mireya with narcan and advised Mireya she could receive a fentanyl testing kit at [facility]. Stage of change for addressing substance use diagnoses: Contemplation    ASSESSMENT:  Mireya Gandhi presents with a Labile mood. Mireya would fluctuate between tearfulness and laughter.  Therapist would surmise Mireya's use of cocaine regularly is causing her to have imbalanced mood and increase psychiatric symptoms.     her affect is Normal range and intensity, which is congruent, with her mood and the content of the session. The client has made progress on their goals.     Mireya Gandhi presents with a none risk of suicide, none risk of self-harm, and none risk of harm to others.    For any risk assessment that surpasses a \"low\" rating, a safety plan must be developed.    A safety plan was indicated: no  If yes, describe in detail n/a    PLAN: Between sessions, Mireya Gandhi will Go to [facility] and acquire a fentanyl testing kit. At the next session, the therapist will use Client-centered Therapy, Cognitive Behavioral Therapy, Motivational Interviewing, and Supportive Psychotherapy to address Mireya's struggles with maintaining a stable mood and developing an effective abstinence plan.    Behavioral Health Treatment Plan and Discharge Planning: Mireya Gandhi is aware of and agrees to continue to work on their treatment plan. They have identified and are working toward their discharge goals. yes    Visit start and stop times:    12/09/24  Start Time: 1354  Stop Time: 1442  Total Visit Time: 48 minutes  "

## 2024-12-09 NOTE — PROGRESS NOTES
"Procedure Details  30  - INTRAORAL - PERIAPICAL FIRST RADIOGRAPHIC IMAGE   - LIMITED ORAL EVALUATION - PROBLEM FOCUSED   - BITEWING - SINGLE RADIOGRAPHIC IMAGE  Limited Exam    Mireya Gandhi presents for a Limited exam with CC of \"Pain from lower right for about a month\"  Verbal consent for treatment given in addition to the forms.    Reviewed health history - Patient is ASA III  Consents signed: Yes    Pain Scale: 10  EOE shows no facial swelling, slight sensitivity to palpation of right submandibular lymph node.  IOE shows tooth #30 with large carious fracture.  Tooth #30 very sensitive to percussion.  No sinus tracts or intra oral swelling noted.  Radiographs: 1 BW and 1 PA taken tooth #30.  Tooth deemed non-restorable based on depth of mesial decay.  Developing PARLs noted on mesial and distal apicies.  Tooth has very long roots.    Discussed with patient that due to length of roots and extent of decay, this extraction must be completed by an oral surgeon.  Patient understands.  STAT referral provided.    Prescriptions sent for:   Amoxicillin 500mg every 8 hours for 7 days  Motrin 800mg every 6 hours as needed for pain.    Treatment Plan:  Infection control: STAT referral to [facility] for extraction of Tooth #30    Next visit: Comprehensive exam if patient wishes to become patient of record.    "

## 2024-12-09 NOTE — DENTAL PROCEDURE DETAILS
"Limited Exam    Mireya Gandhi presents for a Limited exam with CC of \"Pain from lower right for about a month\"  Verbal consent for treatment given in addition to the forms.    Reviewed health history - Patient is ASA III  Consents signed: Yes    Pain Scale: 10  EOE shows no facial swelling, slight sensitivity to palpation of right submandibular lymph node.  IOE shows tooth #30 with large carious fracture.  Tooth #30 very sensitive to percussion.  No sinus tracts or intra oral swelling noted.  Radiographs: 1 BW and 1 PA taken tooth #30.  Tooth deemed non-restorable based on depth of mesial decay.  Developing PARLs noted on mesial and distal apicies.  Tooth has very long roots.    Discussed with patient that due to length of roots and extent of decay, this extraction must be completed by an oral surgeon.  Patient understands.  STAT referral provided.    Prescriptions sent for:   Amoxicillin 500mg every 8 hours for 7 days  Motrin 800mg every 6 hours as needed for pain.    Treatment Plan:  Infection control: STAT referral to [facility] for extraction of Tooth #30    Next visit: Comprehensive exam if patient wishes to become patient of record.  " Unknown if ever smoked

## 2024-12-12 ENCOUNTER — HOSPITAL ENCOUNTER (EMERGENCY)
Facility: HOSPITAL | Age: 49
Discharge: HOME | End: 2024-12-12
Attending: EMERGENCY MEDICINE
Payer: MEDICARE

## 2024-12-12 VITALS
SYSTOLIC BLOOD PRESSURE: 165 MMHG | DIASTOLIC BLOOD PRESSURE: 88 MMHG | RESPIRATION RATE: 18 BRPM | OXYGEN SATURATION: 100 % | TEMPERATURE: 98 F | HEART RATE: 76 BPM

## 2024-12-12 DIAGNOSIS — L30.9 ECZEMA: Primary | ICD-10-CM

## 2024-12-12 PROCEDURE — 99283 EMERGENCY DEPT VISIT LOW MDM: CPT

## 2024-12-12 PROCEDURE — 99282 EMERGENCY DEPT VISIT SF MDM: CPT

## 2024-12-12 RX ORDER — CLOTRIMAZOLE 1 %
CREAM (GRAM) TOPICAL
Qty: 15 G | Refills: 0 | Status: SHIPPED | OUTPATIENT
Start: 2024-12-12

## 2024-12-12 NOTE — ED NOTES
Pt was evaluated and discharged by provider prior to nursing assessment.        Rachel José RN  12/12/24 4793

## 2024-12-12 NOTE — ED PROVIDER NOTES
Time reflects when diagnosis was documented in both MDM as applicable and the Disposition within this note       Time User Action Codes Description Comment    12/12/2024  2:49 PM Yuri Pugh Add [L30.9] Eczema           ED Disposition       ED Disposition   Discharge    Condition   Stable    Date/Time   Thu Dec 12, 2024  2:49 PM    Comment   Mireya Gandhi discharge to home/self care.                   Assessment & Plan       Medical Decision Making  The pictures below.  Suspecting dyshidrotic eczema.  However patient requesting to be treated for athlete's foot, will prescribe Lotrimin.  Discussed Vanicream and Aquanil for her symptoms.  Referral to dermatology.    I have discussed the plan to discharge pt from ED. The patient was discharged in stable condition.  Patient ambulated off the department.  Extensive return to emergency department precautions were discussed.  Follow up with appropriate providers including primary care physician was discussed.  Patient and/or their  primary decision maker expressed understanding.  Patient remained stable during entire emergency department stay.               Medications - No data to display    ED Risk Strat Scores                          SBIRT 22yo+      Flowsheet Row Most Recent Value   Initial Alcohol Screen: US AUDIT-C     1. How often do you have a drink containing alcohol? 0 Filed at: 12/12/2024 1403   2. How many drinks containing alcohol do you have on a typical day you are drinking?  0 Filed at: 12/12/2024 1403   3a. Male UNDER 65: How often do you have five or more drinks on one occasion? 0 Filed at: 12/12/2024 1403   3b. FEMALE Any Age, or MALE 65+: How often do you have 4 or more drinks on one occassion? 0 Filed at: 12/12/2024 1403   Audit-C Score 0 Filed at: 12/12/2024 1403   PRABHJOT: How many times in the past year have you...    Used an illegal drug or used a prescription medication for non-medical reasons? Never Filed at: 12/12/2024 1403       "                      History of Present Illness       Chief Complaint   Patient presents with    Skin Problem     Pt c/o cracking dry peeling skin on her hands and feet. It started a few months ago was seen and told its eczema but none of the medication worked. Pt does have ring worm on her leg and not being treated for that. Pt does have a cat in a kaylin and RN did explain she can not bring the cat but she has no one to come pick it up        Past Medical History:   Diagnosis Date    Cardiomyopathy (HCC)     Elevated transaminase measurement     Irritable bowel syndrome (IBS)     with Constipation    Nutritional deficiency     Overactive thyroid gland     Palpitations     Sweating abnormality     Vaginal infection     Vitamin D insufficiency       Past Surgical History:   Procedure Laterality Date    ANKLE SURGERY Left     FINGER SURGERY      TUBAL LIGATION      UPPER GASTROINTESTINAL ENDOSCOPY        Family History   Problem Relation Age of Onset    No Known Problems Mother     Diabetes Father     Cancer Father       Social History     Tobacco Use    Smoking status: Every Day     Current packs/day: 0.50     Average packs/day: 0.5 packs/day for 30.0 years (15.0 ttl pk-yrs)     Types: Cigarettes    Smokeless tobacco: Never   Vaping Use    Vaping status: Never Used   Substance Use Topics    Alcohol use: Not Currently     Comment: occasionally    Drug use: Not Currently     Types: Cocaine, Marijuana      E-Cigarette/Vaping    E-Cigarette Use Never User       E-Cigarette/Vaping Substances      I have reviewed and agree with the history as documented.     49-year-old female presents today with rash on her bilateral palms and soles.  Reports it is very dry skin with cracking and peeling.  Reports it itches and burns as well.  States that she did see her PCP for this and was told she had a \"type of eczema\".  States that she has been attempting to use Vaseline which does seem to help slightly.  Patient is questioning if " "it is a fungus.  Patient is fixated on having a parasite in her body because a few months ago she had an \"incident\" where toilet water was dripping into her sink and she noticed a little black things moving in it.  Reports that she cleaned the sink and accidentally touched her mouth.  Denies any chest pain, shortness of breath, abdominal pain, nausea, vomiting or diarrhea.        Review of Systems   Constitutional:  Negative for chills and fever.   Respiratory:  Negative for shortness of breath.    Cardiovascular:  Negative for chest pain and palpitations.   Gastrointestinal:  Negative for abdominal pain, nausea and vomiting.   Genitourinary:  Negative for dysuria, hematuria and urgency.   Skin:  Positive for rash.           Objective       ED Triage Vitals   Temperature Pulse Blood Pressure Respirations SpO2 Patient Position - Orthostatic VS   12/12/24 1402 12/12/24 1402 12/12/24 1403 12/12/24 1402 12/12/24 1402 12/12/24 1402   98 °F (36.7 °C) 76 165/88 18 100 % Sitting      Temp src Heart Rate Source BP Location FiO2 (%) Pain Score    -- -- 12/12/24 1402 -- --      Left arm        Vitals      Date and Time Temp Pulse SpO2 Resp BP Pain Score FACES Pain Rating User   12/12/24 1403 -- -- -- -- 165/88 -- -- BA   12/12/24 1402 98 °F (36.7 °C) 76 100 % 18 -- -- -- BA            Physical Exam  Vitals and nursing note reviewed.   Constitutional:       General: She is not in acute distress.     Appearance: Normal appearance. She is well-developed.   HENT:      Head: Normocephalic and atraumatic.   Eyes:      Conjunctiva/sclera: Conjunctivae normal.   Cardiovascular:      Rate and Rhythm: Normal rate.   Pulmonary:      Effort: Pulmonary effort is normal.   Musculoskeletal:         General: No swelling. Normal range of motion.      Cervical back: Normal range of motion and neck supple.   Skin:     General: Skin is warm and dry.      Findings: Rash present. Rash is crusting. Rash is not scaling.   Neurological:      Mental " Status: She is alert.   Psychiatric:         Mood and Affect: Mood normal.         Behavior: Behavior normal.                 Results Reviewed       None            No orders to display       Procedures    ED Medication and Procedure Management   Prior to Admission Medications   Prescriptions Last Dose Informant Patient Reported? Taking?   CombiPatch 0.05-0.14 MG/DAY   Yes No   Sig: Place 1 patch on the skin 2 (two) times a week   Latuda 40 MG tablet   Yes No   Sig: Take 40 mg by mouth daily with dinner   amoxicillin (AMOXIL) 500 mg capsule   No No   Sig: Take 1 capsule (500 mg total) by mouth every 8 (eight) hours for 7 days   budesonide-formoterol (SYMBICORT) 80-4.5 MCG/ACT inhaler   Yes No   Sig: Inhale 2 puffs 2 (two) times a day   buprenorphine-naloxone (Suboxone) 12-3 MG   No No   Sig: Place 1 Film (12 mg total) under the tongue 2 (two) times a day   busPIRone (BUSPAR) 30 MG tablet   Yes No   Sig: Take 30 mg by mouth 2 (two) times a day   desvenlafaxine (PRISTIQ) 100 mg 24 hr tablet   Yes No   Sig: Take 100 mg by mouth daily   dicyclomine (BENTYL) 20 mg tablet   Yes No   Sig: Take 20 mg by mouth every 6 (six) hours   esomeprazole (NexIUM) 40 MG capsule   Yes No   Sig: Take 40 mg by mouth   hydrOXYzine pamoate (VISTARIL) 50 mg capsule   No No   Sig: Take 1 capsule (50 mg total) by mouth 3 (three) times a day   hydrocortisone 2.5 % cream   No No   Sig: Apply topically 2 (two) times a day Bilateral hands   ibuprofen (MOTRIN) 800 mg tablet   No No   Sig: Take 1 tablet (800 mg total) by mouth every 6 (six) hours as needed for moderate pain   losartan (COZAAR) 25 mg tablet   Yes No   Sig: Take 25 mg by mouth daily   nicotine (NICODERM CQ) 14 mg/24hr TD 24 hr patch   No No   Sig: Place 1 patch on the skin over 24 hours daily Do not start before September 26, 2024.   prazosin (MINIPRESS) 2 mg capsule   No No   Sig: Take 1 capsule (2 mg total) by mouth daily at bedtime   vitamin B-12 (VITAMIN B-12) 1,000 mcg tablet    Yes No   Sig: Take 1,000 mcg by mouth daily      Facility-Administered Medications: None     Patient's Medications   Discharge Prescriptions    CLOTRIMAZOLE (LOTRIMIN) 1 % CREAM    Apply to affected area 2 times daily       Start Date: 12/12/2024End Date: --       Order Dose: --       Quantity: 15 g    Refills: 0       ED SEPSIS DOCUMENTATION   Time reflects when diagnosis was documented in both MDM as applicable and the Disposition within this note       Time User Action Codes Description Comment    12/12/2024  2:49 PM Yuri Pugh Add [L30.9] Eczema                  Yuri Pugh PA-C  12/12/24 1500

## 2024-12-16 ENCOUNTER — TELEMEDICINE (OUTPATIENT)
Dept: PSYCHIATRY | Facility: CLINIC | Age: 49
End: 2024-12-16
Payer: MEDICARE

## 2024-12-16 DIAGNOSIS — F11.90 OPIOID USE DISORDER: ICD-10-CM

## 2024-12-16 DIAGNOSIS — F43.10 PTSD (POST-TRAUMATIC STRESS DISORDER): ICD-10-CM

## 2024-12-16 DIAGNOSIS — F39 MOOD DISORDER (HCC): ICD-10-CM

## 2024-12-16 DIAGNOSIS — R46.89 RISK TAKING BEHAVIOR: ICD-10-CM

## 2024-12-16 DIAGNOSIS — F19.90 POLYSUBSTANCE USE DISORDER: Primary | ICD-10-CM

## 2024-12-16 PROCEDURE — 90832 PSYTX W PT 30 MINUTES: CPT | Performed by: COUNSELOR

## 2024-12-16 RX ORDER — BUPRENORPHINE AND NALOXONE 12; 3 MG/1; MG/1
12 FILM, SOLUBLE BUCCAL; SUBLINGUAL 2 TIMES DAILY
Qty: 60 FILM | Refills: 0 | Status: SHIPPED | OUTPATIENT
Start: 2024-12-16 | End: 2025-02-14

## 2024-12-16 NOTE — TELEPHONE ENCOUNTER
Medication Refill Request for:suboxone     When was the patient last seen by MAT provider?   10/30  Have they had any cancellations or no-shows since the last visit? Yes [x] No []  12/16- pt cancelled due to weather conditions- ok with provider  When is the next appointment scheduled?  1/13    Verified pharmacy   [x]   [facility]- [address] [place] [address]    Verified ordering Provider   [x]    Does patient have enough for the next 3 days? Yes [] No [x]

## 2024-12-16 NOTE — PSYCH
Behavioral Health Psychotherapy Progress Note    Psychotherapy Provided: Individual Psychotherapy     1. Polysubstance use disorder        2. Mood disorder (HCC)        3. PTSD (post-traumatic stress disorder)        4. Risk taking behavior        5. Opioid use disorder            Goals addressed in session: Goal 1, Goal 2, and Goal 3      DATA: Mireya was seen for therapy. Mireya reports being more organized and following through on the tasks discussed at last session. Mireya reports she had met with conference of churches, called about rooms, and had been utilizing the medication boxes. Mireya and therapist discussed how maintaining her mental health stability also included ensuring Mireya is maintaining her daily needs. Mireya and therapist discussed ways Mireya could stay organized and meet her daily needs. Mireya was scheduled for a session in 1 week.  During this session, this clinician used the following therapeutic modalities: Client-centered Therapy, Cognitive Behavioral Therapy, Motivational Interviewing, and Supportive Psychotherapy    Substance Abuse was addressed during this session. If the client is diagnosed with a co-occurring substance use disorder, please indicate any changes in the frequency or amount of use: Mireya reports she hasn't used crack in over a week.  Therapist utilized stage appropriate interventions to address this substance abuse issue. Mireya was receptive to discussing maintaining abstinence from crack cocaine and discussed how this drug had negatively affected others around her. Mireya was receptive to going to [facility] and acquiring a fentanyl testing kit due to concerns regarding fentanyl in crack cocaine.  Stage of change for addressing substance use diagnoses: Preparation    ASSESSMENT:  Mireya Gandhi presents with a Euthymic/ normal mood.Mireya presented with improved focus and this may be due to her decrease in crack cocaine usage.      "her affect is Normal range and intensity, which is congruent, with her mood and the content of the session. The client has made progress on their goals.     iMreya Gandhi presents with a none risk of suicide, none risk of self-harm, and none risk of harm to others.    For any risk assessment that surpasses a \"low\" rating, a safety plan must be developed.    A safety plan was indicated: no  If yes, describe in detail n/a    PLAN: Between sessions, Mireya Gandhi will go to [facility] for the journeys program and fentanyl testing strip. At the next session, the therapist will use Client-centered Therapy, Cognitive Behavioral Therapy, Motivational Interviewing, and Supportive Psychotherapy to address Mireya's development of mental health stability skills and relapse prevention.    Behavioral Health Treatment Plan and Discharge Planning: Mireya Gandhi is aware of and agrees to continue to work on their treatment plan. They have identified and are working toward their discharge goals. yes    Depression Follow-up Plan Completed: No    Visit start and stop times:    12/17/24  Start Time: 1456  Stop Time: 1529  Total Visit Time: 33 minutes  Virtual Regular Visit    Verification of patient location:    Patient is located at Home in the following state in which I hold an active license [place]      Assessment/Plan:    Problem List Items Addressed This Visit       Polysubstance use disorder - Primary    Mood disorder (HCC)    Opioid use disorder    PTSD (post-traumatic stress disorder)    Risk taking behavior       Goals addressed in session: Goal 1, Goal 2, and Goal 3      Depression Follow-up Plan Completed: No    Reason for visit is   Chief Complaint   Patient presents with    Virtual Regular Visit          Encounter provider Taurus Carvajal      Recent Visits  Date Type Provider Dept   12/16/24 Telemedicine Taurus Carvajal [facility]   Showing recent visits within past 7 days and " meeting all other requirements  Future Appointments  No visits were found meeting these conditions.  Showing future appointments within next 150 days and meeting all other requirements       The patient was identified by name and date of birth. Mireya Gandhi was informed that this is a telemedicine visit and that the visit is being conducted throughthe Epic Embedded platform. She agrees to proceed..  My office door was closed. No one else was in the room.  She acknowledged consent and understanding of privacy and security of the video platform. The patient has agreed to participate and understands they can discontinue the visit at any time.    Patient is aware this is a billable service.     Subjective  Mireya Gandhi is a 49 y.o. female who was seen for a telehealth session .      HPI     Past Medical History:   Diagnosis Date    Cardiomyopathy (HCC)     Elevated transaminase measurement     Irritable bowel syndrome (IBS)     with Constipation    Nutritional deficiency     Overactive thyroid gland     Palpitations     Sweating abnormality     Vaginal infection     Vitamin D insufficiency        Past Surgical History:   Procedure Laterality Date    ANKLE SURGERY Left     FINGER SURGERY      TUBAL LIGATION      UPPER GASTROINTESTINAL ENDOSCOPY         Current Outpatient Medications   Medication Sig Dispense Refill    budesonide-formoterol (SYMBICORT) 80-4.5 MCG/ACT inhaler Inhale 2 puffs 2 (two) times a day      buprenorphine-naloxone (Suboxone) 12-3 MG Place 1 Film (12 mg total) under the tongue 2 (two) times a day 60 Film 0    busPIRone (BUSPAR) 30 MG tablet Take 30 mg by mouth 2 (two) times a day      clotrimazole (LOTRIMIN) 1 % cream Apply to affected area 2 times daily 15 g 0    CombiPatch 0.05-0.14 MG/DAY Place 1 patch on the skin 2 (two) times a week      desvenlafaxine (PRISTIQ) 100 mg 24 hr tablet Take 100 mg by mouth daily      dicyclomine (BENTYL) 20 mg tablet Take 20 mg by mouth every 6 (six)  hours      esomeprazole (NexIUM) 40 MG capsule Take 40 mg by mouth      hydrocortisone 2.5 % cream Apply topically 2 (two) times a day Bilateral hands 3.5 g 0    hydrOXYzine pamoate (VISTARIL) 50 mg capsule Take 1 capsule (50 mg total) by mouth 3 (three) times a day 90 capsule 1    ibuprofen (MOTRIN) 800 mg tablet Take 1 tablet (800 mg total) by mouth every 6 (six) hours as needed for moderate pain 30 tablet 0    Latuda 40 MG tablet Take 40 mg by mouth daily with dinner      losartan (COZAAR) 25 mg tablet Take 25 mg by mouth daily      nicotine (NICODERM CQ) 14 mg/24hr TD 24 hr patch Place 1 patch on the skin over 24 hours daily Do not start before September 26, 2024. 28 patch 0    prazosin (MINIPRESS) 2 mg capsule Take 1 capsule (2 mg total) by mouth daily at bedtime 30 capsule 0    vitamin B-12 (VITAMIN B-12) 1,000 mcg tablet Take 1,000 mcg by mouth daily       No current facility-administered medications for this visit.        Allergies   Allergen Reactions    Gabapentin Anaphylaxis    Hydrocodone Hives    Adhesive [Medical Tape] Rash       Review of Systems    Video Exam    There were no vitals filed for this visit.    Physical Exam     Visit Time    Visit Start Time: 1456  Visit Stop Time: 1529   Total Visit Duration:  33 minutes

## 2024-12-20 ENCOUNTER — TELEPHONE (OUTPATIENT)
Dept: DENTISTRY | Facility: CLINIC | Age: 49
End: 2024-12-20

## 2024-12-23 ENCOUNTER — TELEPHONE (OUTPATIENT)
Dept: PSYCHIATRY | Facility: CLINIC | Age: 49
End: 2024-12-23

## 2024-12-23 NOTE — TELEPHONE ENCOUNTER
Mireya called to cancel/reschedule her appt today with sadi Tillman, due to her having an appt for apartments. Mireya rescheduled her appt for 12/26/24 as a virtual appt.    For your review.

## 2024-12-26 ENCOUNTER — TELEMEDICINE (OUTPATIENT)
Dept: PSYCHIATRY | Facility: CLINIC | Age: 49
End: 2024-12-26
Payer: MEDICARE

## 2024-12-26 DIAGNOSIS — F43.10 PTSD (POST-TRAUMATIC STRESS DISORDER): ICD-10-CM

## 2024-12-26 DIAGNOSIS — F39 MOOD DISORDER (HCC): ICD-10-CM

## 2024-12-26 DIAGNOSIS — F11.90 OPIOID USE DISORDER: ICD-10-CM

## 2024-12-26 DIAGNOSIS — F19.90 POLYSUBSTANCE USE DISORDER: Primary | ICD-10-CM

## 2024-12-26 DIAGNOSIS — R46.89 RISK TAKING BEHAVIOR: ICD-10-CM

## 2024-12-26 PROCEDURE — 90834 PSYTX W PT 45 MINUTES: CPT | Performed by: COUNSELOR

## 2024-12-26 NOTE — PSYCH
Behavioral Health Psychotherapy Progress Note    Psychotherapy Provided: Individual Psychotherapy     1. Polysubstance use disorder        2. Mood disorder (HCC)        3. PTSD (post-traumatic stress disorder)        4. Risk taking behavior        5. Opioid use disorder            Goals addressed in session: Goal 1, Goal 2, and Goal 3      DATA: Mireya was seen for a therapy session.  Mireya reports she found a new room to rent but it is out of [place]. Mireya and therapist discussed the logistics of this move. Mireya reviewed her plans. Therapist highlighted some of the complications of this plan and encouraged not to make impulsive decisions. Mireya and therapist discussed how her impulsive decisions could cause her future problems. Mireya reports struggles with motivation to follow up on her needs due to not feeling well.  Therapist highlighted this may be due to Mireya's continued binges on  drugs. Mireya stated she felt if she moved out of [place] she would not have this situation any longer.  Mireya reports she has been trying to keep a healthy mood by watching television and internet. Mireya stated she recognizes her obsessive thinking causes her to feel overwhelmed. Mireya indicated she recognizes the need for distraction.  Mireya reports lack of medication adherence and states she is tired of taking pills.  Therapist highlighted the importance of medication adherence and following up with her providers. Mireya and therapist discussed some tasks for Mireya to complete this week.  Mireya was scheduled for a session in 3 weeks.  During this session, this clinician used the following therapeutic modalities: Client-centered Therapy, Cognitive Behavioral Therapy, Motivational Interviewing, and Supportive Psychotherapy    Substance Abuse was addressed during this session. If the client is diagnosed with a co-occurring substance use disorder, please indicate any changes in the  "frequency or amount of use: Mireya reports she smoked crack cocaine and marijuana yesterday. Mireya reports she continues to take her suboxone and has not relapsed on opiates.  Therapist utilized stage appropriate interventions to address this substance abuse issue.   Mireya continues to struggle with recognizing her need to stop her use but did state she had informed her roommates that she has narcan in her room if needed.   Stage of change for addressing substance use diagnoses: Pre-contemplation    ASSESSMENT:  Mireya Gandhi presents with a Euthymic/ normal mood. Mireya was talkative but struggled with effective insight.     her affect is Normal range and intensity, which is congruent, with her mood and the content of the session. The client has made progress on their goals.     Mireya Gandhi presents with a none risk of suicide, none risk of self-harm, and none risk of harm to others.    For any risk assessment that surpasses a \"low\" rating, a safety plan must be developed.    A safety plan was indicated: no  If yes, describe in detail n/a    PLAN: Between sessions, Mireya Gandhi will will call possible person she will rent a room from to determine if she will be able live there. . At the next session, the therapist will use Client-centered Therapy, Cognitive Behavioral Therapy, Motivational Interviewing, and Supportive Psychotherapy to address Mireya's continued difficulty with impulsive decisions and unsafe drug use.    Behavioral Health Treatment Plan and Discharge Planning: Mireya Gandhi is aware of and agrees to continue to work on their treatment plan. They have identified and are working toward their discharge goals. yes    Depression Follow-up Plan Completed: Not applicable    Visit start and stop times:    12/26/24  Start Time: 0759  Stop Time: 0840  Total Visit Time: 41 minutes  Virtual Regular Visit    Verification of patient location:    Patient is located at Home in " the following state in which I hold an active license [place]      Assessment/Plan:    Problem List Items Addressed This Visit       Polysubstance use disorder - Primary    Mood disorder (HCC)    Opioid use disorder    PTSD (post-traumatic stress disorder)    Risk taking behavior       Goals addressed in session: Goal 1, Goal 2, and Goal 3      Depression Follow-up Plan Completed: Not applicable    Reason for visit is   Chief Complaint   Patient presents with    Virtual Regular Visit          Encounter provider Taurus Carvajal      Recent Visits  Date Type Provider Dept   12/23/24 Telephone Joshua Mg MA [facility]   Showing recent visits within past 7 days and meeting all other requirements  Today's Visits  Date Type Provider Dept   12/26/24 Telemedicine Taurus Carvajal [facility]   Showing today's visits and meeting all other requirements  Future Appointments  No visits were found meeting these conditions.  Showing future appointments within next 150 days and meeting all other requirements       The patient was identified by name and date of birth. Mireya Gandhi was informed that this is a telemedicine visit and that the visit is being conducted throughthe Epic Embedded platform. She agrees to proceed..  My office door was closed. No one else was in the room.  She acknowledged consent and understanding of privacy and security of the video platform. The patient has agreed to participate and understands they can discontinue the visit at any time.    Patient is aware this is a billable service.     Subjective  Mireya Gandhi is a 49 y.o. female who was seen for telehealth .      HPI     Past Medical History:   Diagnosis Date    Cardiomyopathy (HCC)     Elevated transaminase measurement     Irritable bowel syndrome (IBS)     with Constipation    Nutritional deficiency     Overactive thyroid gland     Palpitations     Sweating abnormality     Vaginal infection     Vitamin D  insufficiency        Past Surgical History:   Procedure Laterality Date    ANKLE SURGERY Left     FINGER SURGERY      TUBAL LIGATION      UPPER GASTROINTESTINAL ENDOSCOPY         Current Outpatient Medications   Medication Sig Dispense Refill    budesonide-formoterol (SYMBICORT) 80-4.5 MCG/ACT inhaler Inhale 2 puffs 2 (two) times a day      buprenorphine-naloxone (Suboxone) 12-3 MG Place 1 Film (12 mg total) under the tongue 2 (two) times a day 60 Film 0    busPIRone (BUSPAR) 30 MG tablet Take 30 mg by mouth 2 (two) times a day      clotrimazole (LOTRIMIN) 1 % cream Apply to affected area 2 times daily 15 g 0    CombiPatch 0.05-0.14 MG/DAY Place 1 patch on the skin 2 (two) times a week      desvenlafaxine (PRISTIQ) 100 mg 24 hr tablet Take 100 mg by mouth daily      dicyclomine (BENTYL) 20 mg tablet Take 20 mg by mouth every 6 (six) hours      esomeprazole (NexIUM) 40 MG capsule Take 40 mg by mouth      hydrocortisone 2.5 % cream Apply topically 2 (two) times a day Bilateral hands 3.5 g 0    hydrOXYzine pamoate (VISTARIL) 50 mg capsule Take 1 capsule (50 mg total) by mouth 3 (three) times a day 90 capsule 1    ibuprofen (MOTRIN) 800 mg tablet Take 1 tablet (800 mg total) by mouth every 6 (six) hours as needed for moderate pain 30 tablet 0    Latuda 40 MG tablet Take 40 mg by mouth daily with dinner      losartan (COZAAR) 25 mg tablet Take 25 mg by mouth daily      nicotine (NICODERM CQ) 14 mg/24hr TD 24 hr patch Place 1 patch on the skin over 24 hours daily Do not start before September 26, 2024. 28 patch 0    prazosin (MINIPRESS) 2 mg capsule Take 1 capsule (2 mg total) by mouth daily at bedtime 30 capsule 0    vitamin B-12 (VITAMIN B-12) 1,000 mcg tablet Take 1,000 mcg by mouth daily       No current facility-administered medications for this visit.        Allergies   Allergen Reactions    Gabapentin Anaphylaxis    Hydrocodone Hives    Adhesive [Medical Tape] Rash       Review of Systems    Video Exam    There  were no vitals filed for this visit.    Physical Exam     Visit Time    Visit Start Time: 759  Visit Stop Time: 840  Total Visit Duration:  41 minutes

## 2025-01-13 ENCOUNTER — SOCIAL WORK (OUTPATIENT)
Dept: PSYCHIATRY | Facility: CLINIC | Age: 50
End: 2025-01-13
Payer: MEDICARE

## 2025-01-13 ENCOUNTER — OFFICE VISIT (OUTPATIENT)
Dept: OTHER | Age: 50
End: 2025-01-13
Payer: MEDICARE

## 2025-01-13 VITALS
WEIGHT: 146 LBS | HEART RATE: 64 BPM | SYSTOLIC BLOOD PRESSURE: 144 MMHG | BODY MASS INDEX: 21.62 KG/M2 | HEIGHT: 69 IN | DIASTOLIC BLOOD PRESSURE: 86 MMHG

## 2025-01-13 DIAGNOSIS — F43.10 PTSD (POST-TRAUMATIC STRESS DISORDER): ICD-10-CM

## 2025-01-13 DIAGNOSIS — F19.90 POLYSUBSTANCE USE DISORDER: Primary | ICD-10-CM

## 2025-01-13 DIAGNOSIS — F11.90 OPIOID USE DISORDER: Primary | ICD-10-CM

## 2025-01-13 DIAGNOSIS — F39 MOOD DISORDER (HCC): ICD-10-CM

## 2025-01-13 DIAGNOSIS — F11.90 OPIOID USE DISORDER: ICD-10-CM

## 2025-01-13 DIAGNOSIS — R46.89 RISK TAKING BEHAVIOR: ICD-10-CM

## 2025-01-13 PROCEDURE — 90837 PSYTX W PT 60 MINUTES: CPT | Performed by: COUNSELOR

## 2025-01-13 PROCEDURE — 99212 OFFICE O/P EST SF 10 MIN: CPT

## 2025-01-13 RX ORDER — BUPRENORPHINE AND NALOXONE 12; 3 MG/1; MG/1
12 FILM, SOLUBLE BUCCAL; SUBLINGUAL 2 TIMES DAILY
Qty: 60 FILM | Refills: 2 | Status: SHIPPED | OUTPATIENT
Start: 2025-01-13 | End: 2025-04-13

## 2025-01-13 NOTE — ASSESSMENT & PLAN NOTE
Patient seen for follow up appointment  Denies any illicit use or cravings  Wishes to continue with current suboxone dose   PDMP reviewed   Follow up in two months

## 2025-01-13 NOTE — PSYCH
Behavioral Health Psychotherapy Progress Note    Psychotherapy Provided: Individual Psychotherapy     1. Polysubstance use disorder        2. Mood disorder (HCC)        3. PTSD (post-traumatic stress disorder)        4. Risk taking behavior        5. Opioid use disorder            Goals addressed in session: Goal 1, Goal 2, and Goal 3      DATA: Mireya was seen for therapy. Mireya discussed her continued attempt to find alternate housing. Mireya and therapist discussed this distress and discussed solutions to Mireya's housing dilemma. Mireya debated possible solutions with therapist and worked on developing effective decision making techniques. Mireya was asked to make pros and cons list regarding possible housing options to improve Mireya's decision making skills. Mireya reported some mood instability over the holiday due to the passing of her significant other on New Years gerald last year and discussed her difficulty with managing her mood. Mireya was scheduled for a session in 2 weeks.   During this session, this clinician used the following therapeutic modalities: Client-centered Therapy, Cognitive Behavioral Therapy, Motivational Interviewing, and Supportive Psychotherapy    Substance Abuse was addressed during this session. If the client is diagnosed with a co-occurring substance use disorder, please indicate any changes in the frequency or amount of use: mireya reports she smoked crack cocaine a few days ago.  Mireya reports she continues to maintain abstinence from opiates. Therapist utilized stage appropriate interventions to address this substance abuse issue.  Mireya reports she has been using the fentanyl testing strips and is find fentanyl in her crack cocaine. Mireya reports this has not changed her behavior but continues to maintain that she is considering changing this behavior.   Stage of change for addressing substance use diagnoses: Contemplation    ASSESSMENT:  Mireya  "Oksana presents with a Euthymic/ normal mood. Mireya was goal directed and able to participate in therapy.     her affect is Normal range and intensity, which is congruent, with her mood and the content of the session. The client has made progress on their goals.     Mireya Gandhi presents with a none risk of suicide, none risk of self-harm, and none risk of harm to others.    For any risk assessment that surpasses a \"low\" rating, a safety plan must be developed.    A safety plan was indicated: no  If yes, describe in detail n/a    PLAN: Between sessions, Mireya Gandhi will talk with others regarding possible rooms for rent and consider stopping her crack use if there is fentanyl present. At the next session, the therapist will use Client-centered Therapy, Cognitive Behavioral Therapy, Motivational Interviewing, and Supportive Psychotherapy to address Mireya's continued impulsive use of crack cocaine and difficulty managing her moods.    Behavioral Health Treatment Plan and Discharge Planning: Mireya Gandhi is aware of and agrees to continue to work on their treatment plan. They have identified and are working toward their discharge goals. yes    Depression Follow-up Plan Completed: Not applicable    Visit start and stop times:    01/13/25  Start Time: 1230  Stop Time: 1323  Total Visit Time: 53 minutes  "

## 2025-01-13 NOTE — PROGRESS NOTES
[facility]    Progress Note  Mireya Gandhi 49 y.o. female MRN: [id number]  @ Encounter: [id number]      1. Opioid use disorder  Assessment & Plan:  Patient seen for follow up appointment  Denies any illicit use or cravings  Wishes to continue with current suboxone dose   PDMP reviewed   Follow up in two months   Orders:  -     buprenorphine-naloxone (Suboxone) 12-3 MG; Place 1 Film (12 mg total) under the tongue 2 (two) times a day        Support Services  Case Management and Certified  are following.   Patient was referred to the [facility] Certified Addiction Counselors: No  The patient is actively engaged with the [facility] Certified Addiction Counselor’s psychotherapy plan: No    buprenorphine-naloxone      PDMP reviewed:     PDMP Review         Value Time User    PDMP Reviewed  Yes 10/30/2024  3:14 PM Brigette Davis PA-C            SUBJECTIVE  Doing well from MOUD standpoint.     Drug cravings: denies   Motivation to continue treatment: high       Current Outpatient Medications:     budesonide-formoterol (SYMBICORT) 80-4.5 MCG/ACT inhaler, Inhale 2 puffs 2 (two) times a day, Disp: , Rfl:     buprenorphine-naloxone (Suboxone) 12-3 MG, Place 1 Film (12 mg total) under the tongue 2 (two) times a day, Disp: 60 Film, Rfl: 2    busPIRone (BUSPAR) 30 MG tablet, Take 30 mg by mouth 2 (two) times a day, Disp: , Rfl:     clotrimazole (LOTRIMIN) 1 % cream, Apply to affected area 2 times daily, Disp: 15 g, Rfl: 0    CombiPatch 0.05-0.14 MG/DAY, Place 1 patch on the skin 2 (two) times a week, Disp: , Rfl:     desvenlafaxine (PRISTIQ) 100 mg 24 hr tablet, Take 100 mg by mouth daily, Disp: , Rfl:     dicyclomine (BENTYL) 20 mg tablet, Take 20 mg by mouth every 6 (six) hours, Disp: , Rfl:     esomeprazole (NexIUM) 40 MG capsule, Take 40 mg by mouth, Disp: , Rfl:     ibuprofen (MOTRIN) 800 mg tablet, Take 1 tablet (800 mg total) by mouth every 6 (six) hours as needed for moderate pain,  Disp: 30 tablet, Rfl: 0    Latuda 40 MG tablet, Take 40 mg by mouth daily with dinner, Disp: , Rfl:     hydrocortisone 2.5 % cream, Apply topically 2 (two) times a day Bilateral hands, Disp: 3.5 g, Rfl: 0    hydrOXYzine pamoate (VISTARIL) 50 mg capsule, Take 1 capsule (50 mg total) by mouth 3 (three) times a day, Disp: 90 capsule, Rfl: 1    losartan (COZAAR) 25 mg tablet, Take 25 mg by mouth daily, Disp: , Rfl:     nicotine (NICODERM CQ) 14 mg/24hr TD 24 hr patch, Place 1 patch on the skin over 24 hours daily Do not start before September 26, 2024., Disp: 28 patch, Rfl: 0    prazosin (MINIPRESS) 2 mg capsule, Take 1 capsule (2 mg total) by mouth daily at bedtime, Disp: 30 capsule, Rfl: 0    vitamin B-12 (VITAMIN B-12) 1,000 mcg tablet, Take 1,000 mcg by mouth daily, Disp: , Rfl:     Objective     Vitals:    01/13/25 1338   BP: 144/86   Pulse: 64       Physical Exam         Lab Results:   Results from last 6 Months   Lab Units 09/25/24  0708   WBC Thousand/uL 7.49   HEMOGLOBIN g/dL 11.5   HEMATOCRIT % 35.2   PLATELETS Thousands/uL 207      Results from last 6 Months   Lab Units 09/25/24  0708   POTASSIUM mmol/L 4.2   CHLORIDE mmol/L 110*   CO2 mmol/L 22   BUN mg/dL 14   CREATININE mg/dL 0.76   CALCIUM mg/dL 8.1*   ALBUMIN g/dL 3.4*   ALK PHOS U/L 79   ALT U/L 10   AST U/L 15                         Counseling / Coordination of Care  Total time spent today 20 minutes. Greater than 50% of total time was spent with the patient and / or family counseling and / or coordination of care.     Brigette Davis PA-C

## 2025-01-14 ENCOUNTER — TELEPHONE (OUTPATIENT)
Dept: PSYCHIATRY | Facility: CLINIC | Age: 50
End: 2025-01-14

## 2025-01-14 NOTE — TELEPHONE ENCOUNTER
Mireya called requesting a call back from either Leti MCKENZIE CM, or sadi Tillman.    For your review.

## 2025-01-14 NOTE — TELEPHONE ENCOUNTER
Therapist returned contacted Mireya. Mireya expressed concern regarding her storage unit and indicated that the price has increased.  Therapist provided liberal emotional support.

## 2025-01-16 ENCOUNTER — TELEPHONE (OUTPATIENT)
Dept: PSYCHIATRY | Facility: CLINIC | Age: 50
End: 2025-01-16

## 2025-01-24 ENCOUNTER — TELEPHONE (OUTPATIENT)
Dept: OTHER | Age: 50
End: 2025-01-24

## 2025-01-27 ENCOUNTER — TELEPHONE (OUTPATIENT)
Age: 50
End: 2025-01-27

## 2025-01-27 NOTE — TELEPHONE ENCOUNTER
Tried to call pt regarding rescheduling an appt with Dr Gil on 08/29 in [place]. Pt mailbox was full. Sent message on [employer] with new appt information.

## 2025-02-28 DIAGNOSIS — F11.90 OPIOID USE DISORDER: ICD-10-CM

## 2025-03-01 RX ORDER — BUPRENORPHINE AND NALOXONE 12; 3 MG/1; MG/1
12 FILM, SOLUBLE BUCCAL; SUBLINGUAL 2 TIMES DAILY
Qty: 60 EACH | Refills: 0 | Status: SHIPPED | OUTPATIENT
Start: 2025-03-01 | End: 2025-05-30

## 2025-03-13 ENCOUNTER — TELEPHONE (OUTPATIENT)
Dept: PSYCHIATRY | Facility: CLINIC | Age: 50
End: 2025-03-13

## 2025-03-13 NOTE — TELEPHONE ENCOUNTER
Mireya called back. Informed her of her next appt with Brigette BACON, provider, and that she must see leti MCKENZIE first, before she can see Taurus WRIGHT, therapist, due to her several missed appts.  Scheduled her with Leti MCKENZIE.

## 2025-03-17 ENCOUNTER — OFFICE VISIT (OUTPATIENT)
Dept: PSYCHIATRY | Facility: CLINIC | Age: 50
End: 2025-03-17
Payer: COMMERCIAL

## 2025-03-17 DIAGNOSIS — F19.90 POLYSUBSTANCE USE DISORDER: Primary | ICD-10-CM

## 2025-03-17 PROCEDURE — H0006 ALCOHOL AND/OR DRUG SERVICES: HCPCS

## 2025-03-17 NOTE — PSYCH
Mireya Gandhi  03/17/25  Start Time: 1415  Stop Time: 1445  Total Visit Time: 30 minutes    Visit type: In person    Recovery needs addressed during session: Substance Use Disorder Recovery and Living & Financial [place]    Notes (DAP Format)  Met with patient for in-person scheduled visit.  Patient continues to work with [employer] for rental assistance. Was attempting to gain a rental, but the advertisement was a scam.   Patient mentioned being behind of storage, mentioned talking to ICM from conference of [employer] to see if they can assist.     Patient was alert, though restless in today's session  Patient did not present as a concern to this CM, low risk SI/HI    Patient and this CM discussed patient connecting with CRS for additional support, patient was agreeable.   Patient also requested to meet with heidi earlier as patient thinks meds are being stolen      Referrals made during this visit CRS services     Recovery connections: [facility]    Next appointment follow up to be scheduled

## 2025-03-25 ENCOUNTER — HOSPITAL ENCOUNTER (EMERGENCY)
Facility: HOSPITAL | Age: 50
Discharge: HOME | End: 2025-03-25
Attending: EMERGENCY MEDICINE
Payer: COMMERCIAL

## 2025-03-25 ENCOUNTER — TELEPHONE (OUTPATIENT)
Dept: PSYCHIATRY | Facility: CLINIC | Age: 50
End: 2025-03-25

## 2025-03-25 VITALS
HEART RATE: 60 BPM | TEMPERATURE: 98.1 F | OXYGEN SATURATION: 98 % | SYSTOLIC BLOOD PRESSURE: 170 MMHG | WEIGHT: 133.82 LBS | BODY MASS INDEX: 19.76 KG/M2 | RESPIRATION RATE: 18 BRPM | DIASTOLIC BLOOD PRESSURE: 95 MMHG

## 2025-03-25 DIAGNOSIS — F11.20 OPIATE DEPENDENCE (HCC): Primary | ICD-10-CM

## 2025-03-25 DIAGNOSIS — R68.89 WITHDRAWAL COMPLAINT: ICD-10-CM

## 2025-03-25 DIAGNOSIS — R11.10 VOMITING: ICD-10-CM

## 2025-03-25 PROCEDURE — 99284 EMERGENCY DEPT VISIT MOD MDM: CPT | Performed by: EMERGENCY MEDICINE

## 2025-03-25 PROCEDURE — 99282 EMERGENCY DEPT VISIT SF MDM: CPT

## 2025-03-25 RX ORDER — ONDANSETRON 4 MG/1
4 TABLET, ORALLY DISINTEGRATING ORAL EVERY 8 HOURS PRN
Qty: 20 TABLET | Refills: 0 | Status: SHIPPED | OUTPATIENT
Start: 2025-03-25

## 2025-03-25 RX ORDER — ONDANSETRON 4 MG/1
4 TABLET, ORALLY DISINTEGRATING ORAL ONCE
Status: COMPLETED | OUTPATIENT
Start: 2025-03-25 | End: 2025-03-25

## 2025-03-25 RX ADMIN — ONDANSETRON 4 MG: 4 TABLET, ORALLY DISINTEGRATING ORAL at 22:44

## 2025-03-25 RX ADMIN — BUPRENORPHINE AND NALOXONE 12 MG: 2; .5 FILM BUCCAL; SUBLINGUAL at 22:44

## 2025-03-25 NOTE — TELEPHONE ENCOUNTER
Pt called office requesting to speak with provider. Provider unavailable. Pt stated she discussed 'stolen' meds at last visit with   Leti PABLO. Pt is out of meds for 2 days. Pt experiencing withdrawal symptoms and stated she feels unwell. Writer recommended ED if necessary, pt stated she will wait for a call back. Providers notified.

## 2025-03-26 DIAGNOSIS — F11.90 OPIOID USE DISORDER: ICD-10-CM

## 2025-03-26 RX ORDER — BUPRENORPHINE AND NALOXONE 12; 3 MG/1; MG/1
12 FILM, SOLUBLE BUCCAL; SUBLINGUAL 2 TIMES DAILY
Qty: 14 FILM | Refills: 0 | Status: SHIPPED | OUTPATIENT
Start: 2025-03-26 | End: 2025-04-02

## 2025-03-26 NOTE — TELEPHONE ENCOUNTER
Pt called requesting to speak with provider Leti PABLO regarding medication issue. Writer notified prescriber and submitted med refill.

## 2025-03-26 NOTE — TELEPHONE ENCOUNTER
Pt contacted and notified of temporary refill approved for  at  pharmacy, with next refill being available on 4/3 at local pharmacy.    alone

## 2025-03-26 NOTE — TELEPHONE ENCOUNTER
Medication Refill Request for:  Suboxone 12-3mg    When was the patient last seen by MAT provider?   1/13    Have they had any cancellations or no-shows since the last visit? Yes [] No [x]    When is the next appointment scheduled?   4/7    Verified pharmacy   [x]    Verified ordering Provider   [x]    Does patient have enough for the next 3 days? Yes [] No [x]
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
## 2025-03-31 ENCOUNTER — OFFICE VISIT (OUTPATIENT)
Dept: PSYCHIATRY | Facility: CLINIC | Age: 50
End: 2025-03-31

## 2025-03-31 DIAGNOSIS — F19.90 POLYSUBSTANCE USE DISORDER: Primary | ICD-10-CM
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
## 2025-04-02 ENCOUNTER — TELEPHONE (OUTPATIENT)
Dept: PSYCHIATRY | Facility: CLINIC | Age: 50
End: 2025-04-02

## 2025-04-02 NOTE — TELEPHONE ENCOUNTER
Mireya called to speak to Leti MCKENZIE CM, who was not available. Mireya asks for a C/B to [phone].    For your review.

## 2025-04-13 ENCOUNTER — APPOINTMENT (EMERGENCY)
Dept: CT IMAGING | Facility: HOSPITAL | Age: 50
End: 2025-04-13
Payer: COMMERCIAL

## 2025-04-13 ENCOUNTER — HOSPITAL ENCOUNTER (EMERGENCY)
Facility: HOSPITAL | Age: 50
Discharge: LEFT BEFORE DISCHARGE | End: 2025-04-13
Attending: EMERGENCY MEDICINE
Payer: COMMERCIAL

## 2025-04-13 VITALS
OXYGEN SATURATION: 99 % | DIASTOLIC BLOOD PRESSURE: 104 MMHG | SYSTOLIC BLOOD PRESSURE: 159 MMHG | RESPIRATION RATE: 20 BRPM | HEART RATE: 89 BPM | TEMPERATURE: 98.2 F

## 2025-04-13 DIAGNOSIS — N89.8 VAGINAL DISCHARGE: ICD-10-CM

## 2025-04-13 DIAGNOSIS — N39.0 UTI (URINARY TRACT INFECTION): ICD-10-CM

## 2025-04-13 DIAGNOSIS — R10.9 RIGHT FLANK PAIN: Primary | ICD-10-CM

## 2025-04-13 LAB
ALBUMIN SERPL BCG-MCNC: 4.8 G/DL (ref 3.5–5)
ALP SERPL-CCNC: 104 U/L (ref 34–104)
ALT SERPL W P-5'-P-CCNC: 11 U/L (ref 7–52)
ANION GAP SERPL CALCULATED.3IONS-SCNC: 6 MMOL/L (ref 4–13)
AST SERPL W P-5'-P-CCNC: 16 U/L (ref 13–39)
BACTERIA UR QL AUTO: ABNORMAL /HPF
BASOPHILS # BLD AUTO: 0.06 THOUSANDS/ÂΜL (ref 0–0.1)
BASOPHILS NFR BLD AUTO: 1 % (ref 0–1)
BILIRUB SERPL-MCNC: 0.71 MG/DL (ref 0.2–1)
BILIRUB UR QL STRIP: NEGATIVE
BUN SERPL-MCNC: 9 MG/DL (ref 5–25)
CALCIUM SERPL-MCNC: 9.7 MG/DL (ref 8.4–10.2)
CAOX CRY URNS QL MICRO: ABNORMAL /HPF
CHLORIDE SERPL-SCNC: 101 MMOL/L (ref 96–108)
CLARITY UR: CLEAR
CO2 SERPL-SCNC: 30 MMOL/L (ref 21–32)
COLOR UR: ABNORMAL
CREAT SERPL-MCNC: 0.86 MG/DL (ref 0.6–1.3)
EOSINOPHIL # BLD AUTO: 0.03 THOUSAND/ÂΜL (ref 0–0.61)
EOSINOPHIL NFR BLD AUTO: 0 % (ref 0–6)
ERYTHROCYTE [DISTWIDTH] IN BLOOD BY AUTOMATED COUNT: 13.8 % (ref 11.6–15.1)
EXT PREGNANCY TEST URINE: NEGATIVE
EXT. CONTROL: NORMAL
FINE GRAN CASTS URNS QL MICRO: ABNORMAL /LPF
GFR SERPL CREATININE-BSD FRML MDRD: 80 ML/MIN/1.73SQ M
GLUCOSE SERPL-MCNC: 104 MG/DL (ref 65–140)
GLUCOSE UR STRIP-MCNC: NEGATIVE MG/DL
HCT VFR BLD AUTO: 41.7 % (ref 34.8–46.1)
HGB BLD-MCNC: 13.3 G/DL (ref 11.5–15.4)
HGB UR QL STRIP.AUTO: NEGATIVE
IMM GRANULOCYTES # BLD AUTO: 0.02 THOUSAND/UL (ref 0–0.2)
IMM GRANULOCYTES NFR BLD AUTO: 0 % (ref 0–2)
KETONES UR STRIP-MCNC: NEGATIVE MG/DL
LEUKOCYTE ESTERASE UR QL STRIP: 100
LYMPHOCYTES # BLD AUTO: 2.2 THOUSANDS/ÂΜL (ref 0.6–4.47)
LYMPHOCYTES NFR BLD AUTO: 24 % (ref 14–44)
MCH RBC QN AUTO: 28.9 PG (ref 26.8–34.3)
MCHC RBC AUTO-ENTMCNC: 31.9 G/DL (ref 31.4–37.4)
MCV RBC AUTO: 91 FL (ref 82–98)
MONOCYTES # BLD AUTO: 0.36 THOUSAND/ÂΜL (ref 0.17–1.22)
MONOCYTES NFR BLD AUTO: 4 % (ref 4–12)
MUCOUS THREADS UR QL AUTO: ABNORMAL
NEUTROPHILS # BLD AUTO: 6.46 THOUSANDS/ÂΜL (ref 1.85–7.62)
NEUTS SEG NFR BLD AUTO: 71 % (ref 43–75)
NITRITE UR QL STRIP: NEGATIVE
NON-SQ EPI CELLS URNS QL MICRO: ABNORMAL /HPF
NRBC BLD AUTO-RTO: 0 /100 WBCS
PH UR STRIP.AUTO: 6 [PH]
PLATELET # BLD AUTO: 285 THOUSANDS/UL (ref 149–390)
PMV BLD AUTO: 9.1 FL (ref 8.9–12.7)
POTASSIUM SERPL-SCNC: 3.7 MMOL/L (ref 3.5–5.3)
PROT SERPL-MCNC: 7.8 G/DL (ref 6.4–8.4)
PROT UR STRIP-MCNC: ABNORMAL MG/DL
RBC # BLD AUTO: 4.6 MILLION/UL (ref 3.81–5.12)
RBC #/AREA URNS AUTO: ABNORMAL /HPF
SODIUM SERPL-SCNC: 137 MMOL/L (ref 135–147)
SP GR UR STRIP.AUTO: 1.02 (ref 1–1.04)
UROBILINOGEN UA: 1 MG/DL
WBC # BLD AUTO: 9.13 THOUSAND/UL (ref 4.31–10.16)
WBC #/AREA URNS AUTO: ABNORMAL /HPF

## 2025-04-13 PROCEDURE — 96374 THER/PROPH/DIAG INJ IV PUSH: CPT

## 2025-04-13 PROCEDURE — 74177 CT ABD & PELVIS W/CONTRAST: CPT

## 2025-04-13 PROCEDURE — 36415 COLL VENOUS BLD VENIPUNCTURE: CPT

## 2025-04-13 PROCEDURE — 87591 N.GONORRHOEAE DNA AMP PROB: CPT

## 2025-04-13 PROCEDURE — 96361 HYDRATE IV INFUSION ADD-ON: CPT

## 2025-04-13 PROCEDURE — 85025 COMPLETE CBC W/AUTO DIFF WBC: CPT

## 2025-04-13 PROCEDURE — 81001 URINALYSIS AUTO W/SCOPE: CPT

## 2025-04-13 PROCEDURE — 80053 COMPREHEN METABOLIC PANEL: CPT

## 2025-04-13 PROCEDURE — 81025 URINE PREGNANCY TEST: CPT

## 2025-04-13 PROCEDURE — 81003 URINALYSIS AUTO W/O SCOPE: CPT

## 2025-04-13 PROCEDURE — 99284 EMERGENCY DEPT VISIT MOD MDM: CPT

## 2025-04-13 PROCEDURE — 87491 CHLMYD TRACH DNA AMP PROBE: CPT

## 2025-04-13 PROCEDURE — 81514 NFCT DS BV&VAGINITIS DNA ALG: CPT

## 2025-04-13 RX ORDER — KETOROLAC TROMETHAMINE 30 MG/ML
15 INJECTION, SOLUTION INTRAMUSCULAR; INTRAVENOUS ONCE
Status: COMPLETED | OUTPATIENT
Start: 2025-04-13 | End: 2025-04-13

## 2025-04-13 RX ORDER — CEPHALEXIN 500 MG/1
500 CAPSULE ORAL EVERY 12 HOURS SCHEDULED
Qty: 10 CAPSULE | Refills: 0 | Status: SHIPPED | OUTPATIENT
Start: 2025-04-13 | End: 2025-04-18

## 2025-04-13 RX ADMIN — IOHEXOL 85 ML: 350 INJECTION, SOLUTION INTRAVENOUS at 21:34

## 2025-04-13 RX ADMIN — KETOROLAC TROMETHAMINE 15 MG: 30 INJECTION, SOLUTION INTRAMUSCULAR at 21:12

## 2025-04-13 RX ADMIN — SODIUM CHLORIDE 1000 ML: 0.9 INJECTION, SOLUTION INTRAVENOUS at 21:12

## 2025-04-14 ENCOUNTER — TELEPHONE (OUTPATIENT)
Dept: PSYCHIATRY | Facility: CLINIC | Age: 50
End: 2025-04-14

## 2025-04-14 NOTE — ED PROVIDER NOTES
Time reflects when diagnosis was documented in both MDM as applicable and the Disposition within this note       Time User Action Codes Description Comment    4/13/2025  9:48 PM Radhika Jarrett Add [N39.0] UTI (urinary tract infection)     4/13/2025  9:48 PM Radhika Jarrett Add [N89.8] Vaginal discharge     4/13/2025  9:49 PM Radhika Jarrett Add [R10.9] Right flank pain     4/13/2025  9:50 PM Radhika Jarrett Modify [N39.0] UTI (urinary tract infection)     4/13/2025  9:50 PM Radhika Jarrett Modify [R10.9] Right flank pain           ED Disposition       ED Disposition   Left from Room after Provider Exam    Condition   --    Date/Time   Sun Apr 13, 2025  9:45 PM    Comment   --             Assessment & Plan         Medical Decision Making  Patient presents with 2 weeks of dysuria and right-sided flank pain with a new onset of vaginal discharge.  She is afebrile and nontoxic-appearing.  Differential diagnosis includes but is not limited to UTI, pyelonephritis, renal colic, vulvovaginal candidiasis, bacterial vaginosis, STI, PID, acute intra-abdominal surgical pathology such as appendicitis, or musculoskeletal strain.  Labs without leukocytosis or abnormal LFTs.  UA did reveal moderate bacteria without microscopic hematuria.  Will begin a course of Keflex for the symptomatic bacteruria.  STI testing and a molecular vaginal panel were also collected and are pending.  Patient did undergo imaging of the abdomen and pelvis, however prior to the CT being read the patient alerted nursing staff that she needed to leave and requested to have her IV removed.  Unable to formally discussed the risks of leaving AGAINST MEDICAL ADVICE with the patient prior to her departure from the department.  She will be contacted with any positive results of the imaging or pending lab tests.    Amount and/or Complexity of Data Reviewed  Labs: ordered. Decision-making details documented in ED Course.  Radiology: ordered.    Risk  Prescription drug  "management.        ED Course as of 04/14/25 0937   Sun Apr 13, 2025 2106 Bacteria, UA(!): Moderate       Medications   sodium chloride 0.9 % bolus 1,000 mL (0 mL Intravenous Stopped 4/13/25 2149)   ketorolac (TORADOL) injection 15 mg (15 mg Intravenous Given 4/13/25 2112)   iohexol (OMNIPAQUE) 350 MG/ML injection (MULTI-DOSE) 85 mL (85 mL Intravenous Given 4/13/25 2134)       ED Risk Strat Scores        SBIRT 20yo+      Flowsheet Row Most Recent Value   Initial Alcohol Screen: US AUDIT-C     1. How often do you have a drink containing alcohol? 0 Filed at: 04/13/2025 2030   2. How many drinks containing alcohol do you have on a typical day you are drinking?  0 Filed at: 04/13/2025 2030   3b. FEMALE Any Age, or MALE 65+: How often do you have 4 or more drinks on one occassion? 0 Filed at: 04/13/2025 2030   Audit-C Score 0 Filed at: 04/13/2025 2030   PRABHJOT: How many times in the past year have you...    Used an illegal drug or used a prescription medication for non-medical reasons? Never Filed at: 04/13/2025 2030              History of Present Illness       Chief Complaint   Patient presents with    Possible UTI     Pt reports right sided flank/pelvic pian with painful urination for the past two weeks. States she's had a uti in the past and her symptoms are the same. No meds PTA    Vaginal Discharge     C/o \"milky\" discharge for the past week.        Past Medical History:   Diagnosis Date    Cardiomyopathy (HCC)     Elevated transaminase measurement     Irritable bowel syndrome (IBS)     with Constipation    Nutritional deficiency     Overactive thyroid gland     Palpitations     Sweating abnormality     Vaginal infection     Vitamin D insufficiency       Past Surgical History:   Procedure Laterality Date    ANKLE SURGERY Left     FINGER SURGERY      TUBAL LIGATION      UPPER GASTROINTESTINAL ENDOSCOPY        Family History   Problem Relation Age of Onset    No Known Problems Mother     Diabetes Father     Cancer " Father       Social History     Tobacco Use    Smoking status: Every Day     Current packs/day: 0.50     Average packs/day: 0.5 packs/day for 30.0 years (15.0 ttl pk-yrs)     Types: Cigarettes    Smokeless tobacco: Never   Vaping Use    Vaping status: Never Used   Substance Use Topics    Alcohol use: Not Currently     Comment: occasionally    Drug use: Not Currently     Types: Cocaine, Marijuana      E-Cigarette/Vaping    E-Cigarette Use Never User       E-Cigarette/Vaping Substances    Nicotine No     THC No     CBD No     Flavoring No     Other No     Unknown No       I have reviewed and agree with the history as documented.     The patient is a 49 y.o. female with a past medical history of polysubstance use disorder on suboxone, mood disorder, PTSD, IBS-C, and cardiomyopathy, who presents for evaluation of flank pain.  She reports 2 weeks of painful urination and right-sided flank pain, that patient reports feels similar to when she was diagnosed with a UTI in the past.  No increased urinary frequency, urgency, hematuria, nausea, vomiting, diarrhea, or F/C.  The patient is sexually active and also reports 1 week of white, milky vaginal discharge.  No associated vaginal itching or genital sores.  She is concerned for STDs and would like testing to be performed today.          Review of Systems   Constitutional:  Negative for chills and fever.   HENT:  Negative for congestion, ear pain, rhinorrhea and sore throat.    Eyes:  Negative for pain and visual disturbance.   Respiratory:  Negative for cough and shortness of breath.    Cardiovascular:  Negative for chest pain and palpitations.   Gastrointestinal:  Negative for abdominal pain, diarrhea, nausea and vomiting.   Genitourinary:  Positive for dysuria, flank pain and vaginal discharge. Negative for difficulty urinating, frequency, hematuria and vaginal bleeding.   Musculoskeletal:  Positive for back pain. Negative for arthralgias and myalgias.   Skin:  Negative  for color change and rash.   Neurological:  Negative for seizures and syncope.   All other systems reviewed and are negative.      Objective       ED Triage Vitals   Temperature Pulse Blood Pressure Respirations SpO2 Patient Position - Orthostatic VS   04/13/25 2022 04/13/25 2022 04/13/25 2022 04/13/25 2022 04/13/25 2022 04/13/25 2022   98.2 °F (36.8 °C) 89 (!) 159/104 20 99 % Sitting      Temp Source Heart Rate Source BP Location FiO2 (%) Pain Score    04/13/25 2022 04/13/25 2022 04/13/25 2022 -- 04/13/25 2112    Oral Monitor Left arm  8      Vitals      Date and Time Temp Pulse SpO2 Resp BP Pain Score FACES Pain Rating User   04/13/25 2112 -- -- -- -- -- 8 -- MN   04/13/25 2022 98.2 °F (36.8 °C) 89 99 % 20 159/104 -- -- RG            Physical Exam  Vitals and nursing note reviewed.   Constitutional:       General: She is awake. She is not in acute distress.     Appearance: She is well-developed and normal weight. She is not toxic-appearing or diaphoretic.   HENT:      Head: Normocephalic and atraumatic.      Right Ear: External ear normal.      Left Ear: External ear normal.      Nose: Nose normal.      Mouth/Throat:      Lips: Pink.      Mouth: Mucous membranes are moist.   Eyes:      General: Lids are normal. Gaze aligned appropriately. No scleral icterus.     Conjunctiva/sclera: Conjunctivae normal.      Pupils: Pupils are equal, round, and reactive to light.   Cardiovascular:      Rate and Rhythm: Normal rate and regular rhythm.      Heart sounds: S1 normal and S2 normal. No murmur heard.     No friction rub. No gallop.   Pulmonary:      Effort: Pulmonary effort is normal. No respiratory distress.      Breath sounds: Normal breath sounds and air entry. No wheezing, rhonchi or rales.   Abdominal:      General: Abdomen is flat. There is no distension.      Palpations: Abdomen is soft. There is no mass.      Tenderness: There is no abdominal tenderness. There is no right CVA tenderness, left CVA tenderness,  guarding or rebound.      Comments: Minimal tenderness in the RLQ, but there is tenderness to palpation of the right flank.  No CVA tenderness.   Musculoskeletal:      Cervical back: Normal, full passive range of motion without pain and neck supple. No rigidity or crepitus. No spinous process tenderness or muscular tenderness.      Thoracic back: No spasms or bony tenderness.      Lumbar back: No spasms or bony tenderness.        Back:    Skin:     General: Skin is warm.      Capillary Refill: Capillary refill takes less than 2 seconds.      Coloration: Skin is not pale.      Findings: No rash.   Neurological:      Mental Status: She is alert and oriented to person, place, and time.   Psychiatric:         Attention and Perception: Attention normal.         Speech: Speech normal.         Behavior: Behavior is cooperative.         Results Reviewed       Procedure Component Value Units Date/Time    Comprehensive metabolic panel [[id number]] Collected: 04/13/25 2110    Lab Status: Final result Specimen: Blood from Arm, Left Updated: 04/13/25 2136     Sodium 137 mmol/L      Potassium 3.7 mmol/L      Chloride 101 mmol/L      CO2 30 mmol/L      ANION GAP 6 mmol/L      BUN 9 mg/dL      Creatinine 0.86 mg/dL      Glucose 104 mg/dL      Calcium 9.7 mg/dL      AST 16 U/L      ALT 11 U/L      Alkaline Phosphatase 104 U/L      Total Protein 7.8 g/dL      Albumin 4.8 g/dL      Total Bilirubin 0.71 mg/dL      eGFR 80 ml/min/1.73sq m     Narrative:      National Kid[employer] guidelines for Chronic Kidney Disease (CKD):     Stage 1 with normal or high GFR (GFR > 90 mL/min/1.73 square meters)    Stage 2 Mild CKD (GFR = 60-89 mL/min/1.73 square meters)    Stage 3A Moderate CKD (GFR = 45-59 mL/min/1.73 square meters)    Stage 3B Moderate CKD (GFR = 30-44 mL/min/1.73 square meters)    Stage 4 Severe CKD (GFR = 15-29 mL/min/1.73 square meters)    Stage 5 End Stage CKD (GFR <15 mL/min/1.73 square meters)  Note: GFR calculation  is accurate only with a steady state creatinine    CBC and differential [[id number]] Collected: 04/13/25 2110    Lab Status: Final result Specimen: Blood from Arm, Left Updated: 04/13/25 2118     WBC 9.13 Thousand/uL      RBC 4.60 Million/uL      Hemoglobin 13.3 g/dL      Hematocrit 41.7 %      MCV 91 fL      MCH 28.9 pg      MCHC 31.9 g/dL      RDW 13.8 %      MPV 9.1 fL      Platelets 285 Thousands/uL      nRBC 0 /100 WBCs      Segmented % 71 %      Immature Grans % 0 %      Lymphocytes % 24 %      Monocytes % 4 %      Eosinophils Relative 0 %      Basophils Relative 1 %      Absolute Neutrophils 6.46 Thousands/µL      Absolute Immature Grans 0.02 Thousand/uL      Absolute Lymphocytes 2.20 Thousands/µL      Absolute Monocytes 0.36 Thousand/µL      Eosinophils Absolute 0.03 Thousand/µL      Basophils Absolute 0.06 Thousands/µL     Urine Microscopic [[id number]]  (Abnormal) Collected: 04/13/25 2044    Lab Status: Final result Specimen: Urine, Other Updated: 04/13/25 2106     RBC, UA 2-4 /hpf      WBC, UA 2-4 /hpf      Epithelial Cells Occasional /hpf      Bacteria, UA Moderate /hpf      Fine granular casts 0-3 /lpf      Ca Oxalate [place], UA Moderate /hpf      MUCUS THREADS Occasional    UA w Reflex to Microscopic w Reflex to Culture [[id number]]  (Abnormal) Collected: 04/13/25 2044    Lab Status: Final result Specimen: Urine, Other Updated: 04/13/25 2056     Color, UA [place]     Clarity, UA Clear     Specific Gravity, UA 1.020     pH, UA 6.0     Leukocytes, .0     Nitrite, UA Negative     Protein, UA 30 (1+) mg/dl      Glucose, UA Negative mg/dl      Ketones, UA Negative mg/dl      Bilirubin, UA Negative     Occult Blood, UA Negative     UROBILINOGEN UA 1.0 mg/dL     Molecular Vaginal Panel [[id number]] Collected: 04/13/25 2044    Lab Status: In process Specimen: Genital from Vaginal Updated: 04/13/25 2052    Chlamydia/GC amplified DNA by PCR [[id number]] Collected: 04/13/25 2044    Lab Status: In process Specimen:  Urine, Other Updated: 04/13/25 2051    POCT pregnancy, urine [[id number]]  (Normal) Collected: 04/13/25 2048    Lab Status: Final result Updated: 04/13/25 2048     EXT Preg Test, Ur Negative     Control Valid            CT abdomen pelvis with contrast   Final Interpretation by Andry Taylor MD (04/13 2249)      No acute abdominopelvic abnormality identified.   Large volume stool throughout the colon. Correlate for constipation.      Workstation performed: [id number]             Procedures    ED Medication and Procedure Management   Prior to Admission Medications   Prescriptions Last Dose Informant Patient Reported? Taking?   CombiPatch 0.05-0.14 MG/DAY   Yes No   Sig: Place 1 patch on the skin 2 (two) times a week   Latuda 40 MG tablet   Yes No   Sig: Take 40 mg by mouth daily with dinner   budesonide-formoterol (SYMBICORT) 80-4.5 MCG/ACT inhaler   Yes No   Sig: Inhale 2 puffs 2 (two) times a day   buprenorphine-naloxone (Suboxone) 12-3 MG   No No   Sig: Place 1 Film (12 mg total) under the tongue 2 (two) times a day for 7 days   busPIRone (BUSPAR) 30 MG tablet   Yes No   Sig: Take 30 mg by mouth 2 (two) times a day   clotrimazole (LOTRIMIN) 1 % cream   No No   Sig: Apply to affected area 2 times daily   desvenlafaxine (PRISTIQ) 100 mg 24 hr tablet   Yes No   Sig: Take 100 mg by mouth daily   dicyclomine (BENTYL) 20 mg tablet   Yes No   Sig: Take 20 mg by mouth every 6 (six) hours   esomeprazole (NexIUM) 40 MG capsule   Yes No   Sig: Take 40 mg by mouth   hydrOXYzine pamoate (VISTARIL) 50 mg capsule   No No   Sig: Take 1 capsule (50 mg total) by mouth 3 (three) times a day   hydrocortisone 2.5 % cream   No No   Sig: Apply topically 2 (two) times a day Bilateral hands   ibuprofen (MOTRIN) 800 mg tablet   No No   Sig: Take 1 tablet (800 mg total) by mouth every 6 (six) hours as needed for moderate pain   losartan (COZAAR) 25 mg tablet   Yes No   Sig: Take 25 mg by mouth daily   nicotine (NICODERM CQ) 14 mg/24hr TD 24 hr  patch   No No   Sig: Place 1 patch on the skin over 24 hours daily Do not start before September 26, 2024.   ondansetron (ZOFRAN-ODT) 4 mg disintegrating tablet   No No   Sig: Take 1 tablet (4 mg total) by mouth every 8 (eight) hours as needed for nausea or vomiting   prazosin (MINIPRESS) 2 mg capsule   No No   Sig: Take 1 capsule (2 mg total) by mouth daily at bedtime   vitamin B-12 (VITAMIN B-12) 1,000 mcg tablet   Yes No   Sig: Take 1,000 mcg by mouth daily      Facility-Administered Medications: None     Discharge Medication List as of 4/13/2025  9:49 PM        CONTINUE these medications which have NOT CHANGED    Details   budesonide-formoterol (SYMBICORT) 80-4.5 MCG/ACT inhaler Inhale 2 puffs 2 (two) times a day, Starting Fri 5/31/2024, Historical Med      buprenorphine-naloxone (Suboxone) 12-3 MG Place 1 Film (12 mg total) under the tongue 2 (two) times a day for 7 days, Starting Wed 3/26/2025, Until Wed 4/2/2025, Normal      busPIRone (BUSPAR) 30 MG tablet Take 30 mg by mouth 2 (two) times a day, Historical Med      clotrimazole (LOTRIMIN) 1 % cream Apply to affected area 2 times daily, Normal      CombiPatch 0.05-0.14 MG/DAY Place 1 patch on the skin 2 (two) times a week, Historical Med      desvenlafaxine (PRISTIQ) 100 mg 24 hr tablet Take 100 mg by mouth daily, Starting Tue 8/20/2024, Historical Med      dicyclomine (BENTYL) 20 mg tablet Take 20 mg by mouth every 6 (six) hours, Starting Tue 8/6/2024, Historical Med      esomeprazole (NexIUM) 40 MG capsule Take 40 mg by mouth, Starting Sat 8/3/2024, Historical Med      hydrocortisone 2.5 % cream Apply topically 2 (two) times a day Bilateral hands, Starting Wed 9/25/2024, Normal      hydrOXYzine pamoate (VISTARIL) 50 mg capsule Take 1 capsule (50 mg total) by mouth 3 (three) times a day, Starting Mon 11/27/2023, Until Fri 1/26/2024, Normal      ibuprofen (MOTRIN) 800 mg tablet Take 1 tablet (800 mg total) by mouth every 6 (six) hours as needed for moderate  pain, Starting Mon 12/9/2024, Normal      Latuda 40 MG tablet Take 40 mg by mouth daily with dinner, Historical Med      losartan (COZAAR) 25 mg tablet Take 25 mg by mouth daily, Starting Tue 7/23/2024, Historical Med      nicotine (NICODERM CQ) 14 mg/24hr TD 24 hr patch Place 1 patch on the skin over 24 hours daily Do not start before September 26, 2024., Starting u 9/26/2024, Normal      ondansetron (ZOFRAN-ODT) 4 mg disintegrating tablet Take 1 tablet (4 mg total) by mouth every 8 (eight) hours as needed for nausea or vomiting, Starting Tue 3/25/2025, Normal      prazosin (MINIPRESS) 2 mg capsule Take 1 capsule (2 mg total) by mouth daily at bedtime, Starting Mon 11/20/2023, Until Wed 12/20/2023, Normal      vitamin B-12 (VITAMIN B-12) 1,000 mcg tablet Take 1,000 mcg by mouth daily, Starting Tue 8/20/2024, Until Wed 8/20/2025, Historical Med           No discharge procedures on file.  ED SEPSIS DOCUMENTATION   Time reflects when diagnosis was documented in both MDM as applicable and the Disposition within this note       Time User Action Codes Description Comment    4/13/2025  9:48 PM Radhika Jarrett Add [N39.0] UTI (urinary tract infection)     4/13/2025  9:48 PM Radhika Jarrett Add [N89.8] Vaginal discharge     4/13/2025  9:49 PM Radhika Jarrett Add [R10.9] Right flank pain     4/13/2025  9:50 PM Radhika Jarrett Modify [N39.0] UTI (urinary tract infection)     4/13/2025  9:50 PM Radhika Jarrett Modify [R10.9] Right flank pain                  Radhika Jarrett PA-C  04/17/25 0759

## 2025-04-14 NOTE — ED NOTES
Pt requesting line out because she needs to leave.  Provider made aware.      Thomas J Ruzicka, RICHARD  04/13/25 7972

## 2025-04-15 LAB
C GLABRATA DNA VAG QL NAA+PROBE: NEGATIVE
C KRUSEI DNA VAG QL NAA+PROBE: NEGATIVE
C TRACH DNA SPEC QL NAA+PROBE: NEGATIVE
CANDIDA SP 6 PNL VAG NAA+PROBE: NEGATIVE
N GONORRHOEA DNA SPEC QL NAA+PROBE: NEGATIVE
T VAGINALIS DNA VAG QL NAA+PROBE: POSITIVE
VAGINOSIS/ITIS DNA PNL VAG PROBE+SIG AMP: NEGATIVE

## 2025-04-16 ENCOUNTER — TELEPHONE (OUTPATIENT)
Dept: PSYCHIATRY | Facility: CLINIC | Age: 50
End: 2025-04-16

## 2025-04-16 ENCOUNTER — RESULTS FOLLOW-UP (OUTPATIENT)
Dept: EMERGENCY DEPT | Facility: HOSPITAL | Age: 50
End: 2025-04-16

## 2025-04-16 RX ORDER — METRONIDAZOLE TOPICAL 7.5 MG/G
GEL TOPICAL
Qty: 5 G | Refills: 0 | Status: SHIPPED | OUTPATIENT
Start: 2025-04-16 | End: 2025-04-21

## 2025-04-16 NOTE — TELEPHONE ENCOUNTER
Called and left a  for Mireya, notifying her that we must cancel/reschedule her 4/21/25 appt with Brigette BACON, due to Brigette being out of the office. MAT office number provided. Appt cancelled.    For your review.

## 2025-05-01 ENCOUNTER — TELEPHONE (OUTPATIENT)
Dept: PSYCHIATRY | Facility: CLINIC | Age: 50
End: 2025-05-01

## 2025-05-01 DIAGNOSIS — F11.90 OPIOID USE DISORDER: ICD-10-CM

## 2025-05-01 RX ORDER — BUPRENORPHINE AND NALOXONE 12; 3 MG/1; MG/1
12 FILM, SOLUBLE BUCCAL; SUBLINGUAL 2 TIMES DAILY
Qty: 28 FILM | Refills: 0 | Status: SHIPPED | OUTPATIENT
Start: 2025-05-01 | End: 2025-05-15

## 2025-05-01 NOTE — TELEPHONE ENCOUNTER
Patient called [facility] office requesting to reschedule appts.   Patient is currently requesting to reschedule appts d/t moving belongings from home.     Patient did request to have medication filled by Brigette. Will send this message to provider for review

## 2025-05-19 ENCOUNTER — OFFICE VISIT (OUTPATIENT)
Dept: OTHER | Age: 50
End: 2025-05-19
Payer: COMMERCIAL

## 2025-05-19 VITALS
SYSTOLIC BLOOD PRESSURE: 150 MMHG | HEART RATE: 100 BPM | DIASTOLIC BLOOD PRESSURE: 100 MMHG | BODY MASS INDEX: 19.4 KG/M2 | WEIGHT: 131 LBS | HEIGHT: 69 IN

## 2025-05-19 DIAGNOSIS — F11.90 OPIOID USE DISORDER: ICD-10-CM

## 2025-05-19 PROCEDURE — 99212 OFFICE O/P EST SF 10 MIN: CPT

## 2025-05-19 RX ORDER — BUPRENORPHINE AND NALOXONE 8; 2 MG/1; MG/1
8 FILM, SOLUBLE BUCCAL; SUBLINGUAL 2 TIMES DAILY
Qty: 60 FILM | Refills: 1 | Status: SHIPPED | OUTPATIENT
Start: 2025-05-19 | End: 2025-07-18

## 2025-05-19 NOTE — PROGRESS NOTES
[facility]    Progress Note  Mireya Gandhi 50 y.o. female MRN: [id number]  @ Encounter: [id number]      1. Opioid use disorder  Assessment & Plan:  Patient was currently on Suboxone 12 mg BID  Requesting decrease in medication   States she sometimes will only take 1 1/2 films lately   Will decrease suboxone 8 mg BID   F/U in two months   Orders:  -     buprenorphine-naloxone (Suboxone) 8-2 mg; Place 1 Film (8 mg total) under the tongue in the morning and 1 Film (8 mg total) before bedtime.        Support Services  Case Management and Certified  are following.   Patient was referred to the [facility] Certified Addiction Counselors: No  The patient is actively engaged with the [facility] Certified Addiction Counselor’s psychotherapy plan: No    buprenorphine-naloxone      PDMP reviewed:     PDMP Review         Value Time User    PDMP Reviewed  Yes 5/19/2025  2:59 PM Brigette Davis PA-C            SUBJECTIVE  Patient seen in office. Reports that she is interested in decreasing her suboxone as she ultimately wants to taper off     Drug cravings: none  Motivation to continue treatment: high     Current Medications[1]    Objective     Vitals:    05/19/25 1451   BP: 150/100   Pulse: 100       Physical Exam    Psychiatric:         Mood and Affect: Mood is not anxious.              Lab Results:   Results from last 6 Months   Lab Units 04/13/25  2110   WBC Thousand/uL 9.13   HEMOGLOBIN g/dL 13.3   HEMATOCRIT % 41.7   PLATELETS Thousands/uL 285      Results from last 6 Months   Lab Units 04/13/25  2110   POTASSIUM mmol/L 3.7   CHLORIDE mmol/L 101   CO2 mmol/L 30   BUN mg/dL 9   CREATININE mg/dL 0.86   CALCIUM mg/dL 9.7   ALBUMIN g/dL 4.8   ALK PHOS U/L 104   ALT U/L 11   AST U/L 16                         Counseling / Coordination of Care  Total time spent today 20 minutes. Greater than 50% of total time was spent with the patient and / or family counseling and / or coordination of  care.     Brigette Davis PA-C             [1]   Current Outpatient Medications:     budesonide-formoterol (SYMBICORT) 80-4.5 MCG/ACT inhaler, Inhale 2 puffs in the morning and 2 puffs in the evening., Disp: , Rfl:     buprenorphine-naloxone (Suboxone) 8-2 mg, Place 1 Film (8 mg total) under the tongue in the morning and 1 Film (8 mg total) before bedtime., Disp: 60 Film, Rfl: 1    busPIRone (BUSPAR) 30 MG tablet, Take 30 mg by mouth in the morning and 30 mg in the evening., Disp: , Rfl:     CombiPatch 0.05-0.14 MG/DAY, Place 1 patch on the skin 2 (two) times a week, Disp: , Rfl:     dicyclomine (BENTYL) 20 mg tablet, Take 20 mg by mouth every 6 (six) hours, Disp: , Rfl:     esomeprazole (NexIUM) 40 MG capsule, Take 40 mg by mouth, Disp: , Rfl:     Latuda 40 MG tablet, Take 40 mg by mouth daily with dinner, Disp: , Rfl:     ondansetron (ZOFRAN-ODT) 4 mg disintegrating tablet, Take 1 tablet (4 mg total) by mouth every 8 (eight) hours as needed for nausea or vomiting, Disp: 20 tablet, Rfl: 0    clotrimazole (LOTRIMIN) 1 % cream, Apply to affected area 2 times daily, Disp: 15 g, Rfl: 0    desvenlafaxine (PRISTIQ) 100 mg 24 hr tablet, Take 100 mg by mouth daily, Disp: , Rfl:     hydrocortisone 2.5 % cream, Apply topically 2 (two) times a day Bilateral hands, Disp: 3.5 g, Rfl: 0    hydrOXYzine pamoate (VISTARIL) 50 mg capsule, Take 1 capsule (50 mg total) by mouth 3 (three) times a day, Disp: 90 capsule, Rfl: 1    ibuprofen (MOTRIN) 800 mg tablet, Take 1 tablet (800 mg total) by mouth every 6 (six) hours as needed for moderate pain, Disp: 30 tablet, Rfl: 0    losartan (COZAAR) 25 mg tablet, Take 25 mg by mouth daily, Disp: , Rfl:     metroNIDAZOLE (METROGEL) 0.75 % gel, Apply topically daily at bedtime for 5 days, Disp: 5 g, Rfl: 0    nicotine (NICODERM CQ) 14 mg/24hr TD 24 hr patch, Place 1 patch on the skin over 24 hours daily Do not start before September 26, 2024., Disp: 28 patch, Rfl: 0    prazosin  (MINIPRESS) 2 mg capsule, Take 1 capsule (2 mg total) by mouth daily at bedtime, Disp: 30 capsule, Rfl: 0    vitamin B-12 (VITAMIN B-12) 1,000 mcg tablet, Take 1,000 mcg by mouth daily, Disp: , Rfl:

## 2025-05-19 NOTE — ASSESSMENT & PLAN NOTE
Patient was currently on Suboxone 12 mg BID  Requesting decrease in medication   States she sometimes will only take 1 1/2 films lately   Will decrease suboxone 8 mg BID   F/U in two months

## 2025-05-21 ENCOUNTER — TELEPHONE (OUTPATIENT)
Dept: OTHER | Age: 50
End: 2025-05-21

## 2025-05-21 NOTE — TELEPHONE ENCOUNTER
Per Brigette BACON, provider, must change Mireya's 6/2/25 appt to make it a 2 mo f/u appt for around 7/19/25.    Attempt made to call Mireya regarding above, but the listed phone is not in service.  Will notify Mireya is she calls the office.    For your review.

## 2025-05-22 ENCOUNTER — HOSPITAL ENCOUNTER (EMERGENCY)
Facility: HOSPITAL | Age: 50
Discharge: LEFT BEFORE DISCHARGE | End: 2025-05-22
Attending: EMERGENCY MEDICINE
Payer: COMMERCIAL

## 2025-05-22 VITALS
BODY MASS INDEX: 19.96 KG/M2 | DIASTOLIC BLOOD PRESSURE: 104 MMHG | HEART RATE: 81 BPM | TEMPERATURE: 99.2 F | OXYGEN SATURATION: 99 % | SYSTOLIC BLOOD PRESSURE: 168 MMHG | RESPIRATION RATE: 18 BRPM | WEIGHT: 135.14 LBS

## 2025-05-22 DIAGNOSIS — K04.7 DENTAL ABSCESS: Primary | ICD-10-CM

## 2025-05-22 DIAGNOSIS — K04.7 DENTAL INFECTION: ICD-10-CM

## 2025-05-22 LAB
ALBUMIN SERPL BCG-MCNC: 4.2 G/DL (ref 3.5–5)
ALP SERPL-CCNC: 91 U/L (ref 34–104)
ALT SERPL W P-5'-P-CCNC: 16 U/L (ref 7–52)
ANION GAP SERPL CALCULATED.3IONS-SCNC: 8 MMOL/L (ref 4–13)
AST SERPL W P-5'-P-CCNC: 19 U/L (ref 13–39)
BASOPHILS # BLD AUTO: 0.06 THOUSANDS/ÂΜL (ref 0–0.1)
BASOPHILS NFR BLD AUTO: 1 % (ref 0–1)
BILIRUB SERPL-MCNC: 0.54 MG/DL (ref 0.2–1)
BUN SERPL-MCNC: 21 MG/DL (ref 5–25)
CALCIUM SERPL-MCNC: 9.2 MG/DL (ref 8.4–10.2)
CHLORIDE SERPL-SCNC: 101 MMOL/L (ref 96–108)
CO2 SERPL-SCNC: 27 MMOL/L (ref 21–32)
CREAT SERPL-MCNC: 0.75 MG/DL (ref 0.6–1.3)
CRP SERPL QL: 11.9 MG/L
EOSINOPHIL # BLD AUTO: 0.11 THOUSAND/ÂΜL (ref 0–0.61)
EOSINOPHIL NFR BLD AUTO: 1 % (ref 0–6)
ERYTHROCYTE [DISTWIDTH] IN BLOOD BY AUTOMATED COUNT: 13.6 % (ref 11.6–15.1)
ERYTHROCYTE [SEDIMENTATION RATE] IN BLOOD: 15 MM/HOUR (ref 0–19)
GFR SERPL CREATININE-BSD FRML MDRD: 94 ML/MIN/1.73SQ M
GLUCOSE SERPL-MCNC: 108 MG/DL (ref 65–140)
HCT VFR BLD AUTO: 37 % (ref 34.8–46.1)
HGB BLD-MCNC: 12 G/DL (ref 11.5–15.4)
IMM GRANULOCYTES # BLD AUTO: 0.03 THOUSAND/UL (ref 0–0.2)
IMM GRANULOCYTES NFR BLD AUTO: 0 % (ref 0–2)
LYMPHOCYTES # BLD AUTO: 2.64 THOUSANDS/ÂΜL (ref 0.6–4.47)
LYMPHOCYTES NFR BLD AUTO: 26 % (ref 14–44)
MCH RBC QN AUTO: 29.1 PG (ref 26.8–34.3)
MCHC RBC AUTO-ENTMCNC: 32.4 G/DL (ref 31.4–37.4)
MCV RBC AUTO: 90 FL (ref 82–98)
MONOCYTES # BLD AUTO: 0.52 THOUSAND/ÂΜL (ref 0.17–1.22)
MONOCYTES NFR BLD AUTO: 5 % (ref 4–12)
NEUTROPHILS # BLD AUTO: 6.97 THOUSANDS/ÂΜL (ref 1.85–7.62)
NEUTS SEG NFR BLD AUTO: 67 % (ref 43–75)
NRBC BLD AUTO-RTO: 0 /100 WBCS
PLATELET # BLD AUTO: 237 THOUSANDS/UL (ref 149–390)
PMV BLD AUTO: 9.3 FL (ref 8.9–12.7)
POTASSIUM SERPL-SCNC: 3.9 MMOL/L (ref 3.5–5.3)
PROT SERPL-MCNC: 7 G/DL (ref 6.4–8.4)
RBC # BLD AUTO: 4.13 MILLION/UL (ref 3.81–5.12)
SODIUM SERPL-SCNC: 136 MMOL/L (ref 135–147)
WBC # BLD AUTO: 10.33 THOUSAND/UL (ref 4.31–10.16)

## 2025-05-22 PROCEDURE — 90715 TDAP VACCINE 7 YRS/> IM: CPT | Performed by: EMERGENCY MEDICINE

## 2025-05-22 PROCEDURE — 90471 IMMUNIZATION ADMIN: CPT

## 2025-05-22 PROCEDURE — 99284 EMERGENCY DEPT VISIT MOD MDM: CPT | Performed by: EMERGENCY MEDICINE

## 2025-05-22 PROCEDURE — 86140 C-REACTIVE PROTEIN: CPT | Performed by: EMERGENCY MEDICINE

## 2025-05-22 PROCEDURE — 85025 COMPLETE CBC W/AUTO DIFF WBC: CPT | Performed by: EMERGENCY MEDICINE

## 2025-05-22 PROCEDURE — 99284 EMERGENCY DEPT VISIT MOD MDM: CPT

## 2025-05-22 PROCEDURE — 80053 COMPREHEN METABOLIC PANEL: CPT | Performed by: EMERGENCY MEDICINE

## 2025-05-22 PROCEDURE — 36415 COLL VENOUS BLD VENIPUNCTURE: CPT | Performed by: EMERGENCY MEDICINE

## 2025-05-22 PROCEDURE — 85652 RBC SED RATE AUTOMATED: CPT | Performed by: EMERGENCY MEDICINE

## 2025-05-22 RX ORDER — CLINDAMYCIN HYDROCHLORIDE 150 MG/1
450 CAPSULE ORAL ONCE
Status: COMPLETED | OUTPATIENT
Start: 2025-05-22 | End: 2025-05-22

## 2025-05-22 RX ORDER — CLINDAMYCIN HYDROCHLORIDE 150 MG/1
450 CAPSULE ORAL EVERY 6 HOURS
Qty: 120 CAPSULE | Refills: 0 | Status: SHIPPED | OUTPATIENT
Start: 2025-05-22 | End: 2025-06-01

## 2025-05-22 RX ADMIN — CLINDAMYCIN HYDROCHLORIDE 450 MG: 150 CAPSULE ORAL at 01:37

## 2025-05-22 RX ADMIN — TETANUS TOXOID, REDUCED DIPHTHERIA TOXOID AND ACELLULAR PERTUSSIS VACCINE, ADSORBED 0.5 ML: 5; 2.5; 8; 8; 2.5 SUSPENSION INTRAMUSCULAR at 01:51

## 2025-05-22 NOTE — ED PROVIDER NOTES
Time reflects when diagnosis was documented in both MDM as applicable and the Disposition within this note       Time User Action Codes Description Comment    5/22/2025  1:36 AM Rodolfo Banerjee [K04.7] Dental abscess           ED Disposition       ED Disposition   AMA    Condition   --    Date/Time   Thu May 22, 2025  1:40 AM    Comment   Date: 5/22/2025  Patient: Mireya Gandhi  Admitted: 5/22/2025 12:58 AM  Attending Provider: Rodolfo Banerjee DO    Mireya Gandhi or her authorized caregiver has made the decision for the patient to leave the emergency department agains t the advice of her attending physician. She or her authorized caregiver has been informed and understands the inherent risks, including death, disability, chronic pain, or worsening dental abscess, spread of infection, etc.  She or her authorized ca regiver has decided to accept the responsibility for this decision. Mireya Gandhi and all necessary parties have been advised that she may return for further evaluation or treatment. Her condition at time of discharge was fair.  Mireya grewal had current vital signs as follows:  BP (!) 168/104 (BP Location: Left arm)   Pulse 81   Temp 99.2 °F (37.3 °C) (Oral)   Resp 18   Wt 61.3 kg (135 lb 2.3 oz)                Assessment & Plan       Medical Decision Making  50-year-old female presents with some large dental abscess and abrasions to her hand.  She is here for antibiotics.  She does not wish to have imaging or drainage done at this time.  She states that she must go home to deal with that issue there.  We did discuss return to ER instructions.  She states that she has every intention to come back to the emergency department to get this abscess drained.  In the interim we will start her on clindamycin 450 mg p.o. 4 times daily.    Nursing also spent time with her trying to get her to stay and get further treatment.  She states that she will get treatment during the  "day after she deals with her home issues.    Patient willing to sign AMA paperwork.  She does understand how concerned we are for her health and wellbeing.  She verbalized understanding of discharge instructions and return to ER instructions.    Amount and/or Complexity of Data Reviewed  Labs: ordered.  Radiology: ordered.    Risk  Prescription drug management.             Medications   clindamycin (CLEOCIN) capsule 450 mg (450 mg Oral Given 5/22/25 0137)       ED Risk Strat Scores                    No data recorded        SBIRT 20yo+      Flowsheet Row Most Recent Value   Initial Alcohol Screen: US AUDIT-C     1. How often do you have a drink containing alcohol? 0 Filed at: 05/22/2025 0100   2. How many drinks containing alcohol do you have on a typical day you are drinking?  0 Filed at: 05/22/2025 0100   3b. FEMALE Any Age, or MALE 65+: How often do you have 4 or more drinks on one occassion? 0 Filed at: 05/22/2025 0100   Audit-C Score 0 Filed at: 05/22/2025 0100   PRABHJOT: How many times in the past year have you...    Used an illegal drug or used a prescription medication for non-medical reasons? Never Filed at: 05/22/2025 0100                            History of Present Illness       Chief Complaint   Patient presents with    Hand Laceration     Fell at home and glass object fell with her, landing on shards and cutting left middle digit.     Facial Swelling     Rt sided lower jaw facial swelling \"I woke up like this\". Reports able to swallow but trouble eating        Past Medical History[1]   Past Surgical History[2]   Family History[3]   Social History[4]   E-Cigarette/Vaping    E-Cigarette Use Never User       E-Cigarette/Vaping Substances    Nicotine No     THC No     CBD No     Flavoring No     Other No     Unknown No       I have reviewed and agree with the history as documented.     This is a 50-year-old female that presents to the emergency department with right-sided facial swelling.  She states that it " started this morning.  She denies any purulent discharge or drainage.  Denies fever, chills, nausea or vomiting.  Patient also has shards of glass in her hand but does not wish to have that evaluated.       used: No        Review of Systems   Constitutional: Negative.  Negative for activity change, appetite change, chills and fever.   HENT:  Positive for dental problem.    Eyes: Negative.    Respiratory: Negative.     Endocrine: Negative.    Genitourinary: Negative.    Musculoskeletal: Negative.    Skin:  Positive for wound.   Allergic/Immunologic: Negative.    Neurological: Negative.  Negative for dizziness, numbness and headaches.   Hematological: Negative.    Psychiatric/Behavioral: Negative.             Objective       ED Triage Vitals [05/22/25 0059]   Temperature Pulse Blood Pressure Respirations SpO2 Patient Position - Orthostatic VS   99.2 °F (37.3 °C) 81 (!) 168/104 18 99 % Sitting      Temp Source Heart Rate Source BP Location FiO2 (%) Pain Score    Oral Monitor Left arm -- --      Vitals      Date and Time Temp Pulse SpO2 Resp BP Pain Score FACES Pain Rating User   05/22/25 0059 99.2 °F (37.3 °C) 81 99 % 18 168/104 -- -- DK            Physical Exam  Vitals and nursing note reviewed.   Constitutional:       Appearance: Normal appearance. She is well-developed.   HENT:      Head: Normocephalic and atraumatic. Mass present.      Jaw: Swelling present.        Comments: Abscess position 1     Right Ear: Hearing and external ear normal.      Left Ear: Hearing and external ear normal.      Nose: Nose normal.      Mouth/Throat:      Mouth: Mucous membranes are moist.     Eyes:      General: Lids are normal.      Extraocular Movements: Extraocular movements intact.      Conjunctiva/sclera: Conjunctivae normal.      Pupils: Pupils are equal, round, and reactive to light.     Neck:      Trachea: Trachea normal.     Cardiovascular:      Rate and Rhythm: Normal rate and regular rhythm.      Pulses:  Normal pulses.      Heart sounds: Normal heart sounds.   Pulmonary:      Effort: Pulmonary effort is normal. No tachypnea, bradypnea or respiratory distress.      Breath sounds: Normal breath sounds.   Abdominal:      General: Abdomen is flat. Bowel sounds are normal.      Palpations: Abdomen is soft.     Musculoskeletal:         General: Normal range of motion.      Cervical back: Full passive range of motion without pain, normal range of motion and neck supple.     Skin:     General: Skin is warm and dry.     Neurological:      General: No focal deficit present.      Mental Status: She is alert and oriented to person, place, and time.     Psychiatric:         Mood and Affect: Mood normal.         Behavior: Behavior normal.         Thought Content: Thought content normal.         Judgment: Judgment normal.         Results Reviewed       Procedure Component Value Units Date/Time    CBC and differential [[id number]] Collected: 05/22/25 0143    Lab Status: No result Specimen: Blood from Arm, Right     Comprehensive metabolic panel [[id number]] Collected: 05/22/25 0143    Lab Status: No result Specimen: Blood from Arm, Right     Sedimentation rate, automated [[id number]] Collected: 05/22/25 0143    Lab Status: No result Specimen: Blood from Arm, Right     C-reactive protein [[id number]] Collected: 05/22/25 0143    Lab Status: No result Specimen: Blood from Arm, Right             CT facial bones with contrast    (Results Pending)       Procedures    ED Medication and Procedure Management   Prior to Admission Medications   Prescriptions Last Dose Informant Patient Reported? Taking?   CombiPatch 0.05-0.14 MG/DAY   Yes No   Sig: Place 1 patch on the skin 2 (two) times a week   Latuda 40 MG tablet   Yes No   Sig: Take 40 mg by mouth daily with dinner   budesonide-formoterol (SYMBICORT) 80-4.5 MCG/ACT inhaler   Yes No   Sig: Inhale 2 puffs in the morning and 2 puffs in the evening.   buprenorphine-naloxone (Suboxone) 8-2 mg   No  No   Sig: Place 1 Film (8 mg total) under the tongue in the morning and 1 Film (8 mg total) before bedtime.   busPIRone (BUSPAR) 30 MG tablet   Yes No   Sig: Take 30 mg by mouth in the morning and 30 mg in the evening.   clotrimazole (LOTRIMIN) 1 % cream   No No   Sig: Apply to affected area 2 times daily   desvenlafaxine (PRISTIQ) 100 mg 24 hr tablet   Yes No   Sig: Take 100 mg by mouth daily   dicyclomine (BENTYL) 20 mg tablet   Yes No   Sig: Take 20 mg by mouth every 6 (six) hours   esomeprazole (NexIUM) 40 MG capsule   Yes No   Sig: Take 40 mg by mouth   hydrOXYzine pamoate (VISTARIL) 50 mg capsule   No No   Sig: Take 1 capsule (50 mg total) by mouth 3 (three) times a day   hydrocortisone 2.5 % cream   No No   Sig: Apply topically 2 (two) times a day Bilateral hands   ibuprofen (MOTRIN) 800 mg tablet   No No   Sig: Take 1 tablet (800 mg total) by mouth every 6 (six) hours as needed for moderate pain   losartan (COZAAR) 25 mg tablet   Yes No   Sig: Take 25 mg by mouth daily   metroNIDAZOLE (METROGEL) 0.75 % gel   No No   Sig: Apply topically daily at bedtime for 5 days   nicotine (NICODERM CQ) 14 mg/24hr TD 24 hr patch   No No   Sig: Place 1 patch on the skin over 24 hours daily Do not start before September 26, 2024.   ondansetron (ZOFRAN-ODT) 4 mg disintegrating tablet   No No   Sig: Take 1 tablet (4 mg total) by mouth every 8 (eight) hours as needed for nausea or vomiting   prazosin (MINIPRESS) 2 mg capsule   No No   Sig: Take 1 capsule (2 mg total) by mouth daily at bedtime   vitamin B-12 (VITAMIN B-12) 1,000 mcg tablet   Yes No   Sig: Take 1,000 mcg by mouth daily      Facility-Administered Medications: None     Patient's Medications   Discharge Prescriptions    CLINDAMYCIN (CLEOCIN) 150 MG CAPSULE    Take 3 capsules (450 mg total) by mouth every 6 (six) hours for 10 days       Start Date: 5/22/2025 End Date: 6/1/2025       Order Dose: 450 mg       Quantity: 120 capsule    Refills: 0     No discharge  procedures on file.  ED SEPSIS DOCUMENTATION   Time reflects when diagnosis was documented in both MDM as applicable and the Disposition within this note       Time User Action Codes Description Comment    5/22/2025  1:36 AM Rodolfo Banerjee [K04.7] Dental abscess                    [1]   Past Medical History:  Diagnosis Date    Cardiomyopathy (HCC)     Elevated transaminase measurement     Irritable bowel syndrome (IBS)     with Constipation    Nutritional deficiency     Overactive thyroid gland     Palpitations     Sweating abnormality     Vaginal infection     Vitamin D insufficiency    [2]   Past Surgical History:  Procedure Laterality Date    ANKLE SURGERY Left     FINGER SURGERY      TUBAL LIGATION      UPPER GASTROINTESTINAL ENDOSCOPY     [3]   Family History  Problem Relation Name Age of Onset    No Known Problems Mother      Diabetes Father      Cancer Father     [4]   Social History  Tobacco Use    Smoking status: Every Day     Current packs/day: 0.50     Average packs/day: 0.5 packs/day for 30.0 years (15.0 ttl pk-yrs)     Types: Cigarettes    Smokeless tobacco: Never   Vaping Use    Vaping status: Never Used   Substance Use Topics    Alcohol use: Not Currently     Comment: occasionally    Drug use: Not Currently     Types: Cocaine, Marijuana        Rodolfo Banerjee DO  05/22/25 0546

## 2025-06-04 ENCOUNTER — OFFICE VISIT (OUTPATIENT)
Dept: PSYCHIATRY | Facility: CLINIC | Age: 50
End: 2025-06-04

## 2025-06-04 DIAGNOSIS — F19.90 POLYSUBSTANCE USE DISORDER: Primary | ICD-10-CM

## 2025-06-04 PROCEDURE — PBNCHG PB NO CHARGE PLACEHOLDER

## 2025-06-05 ENCOUNTER — DOCUMENTATION (OUTPATIENT)
Dept: PSYCHIATRY | Facility: CLINIC | Age: 50
End: 2025-06-05

## 2025-06-05 ENCOUNTER — TELEPHONE (OUTPATIENT)
Dept: PSYCHIATRY | Facility: CLINIC | Age: 50
End: 2025-06-05

## 2025-06-05 DIAGNOSIS — F11.90 OPIOID USE DISORDER: Primary | ICD-10-CM

## 2025-06-05 DIAGNOSIS — R46.89 RISK TAKING BEHAVIOR: ICD-10-CM

## 2025-06-05 DIAGNOSIS — F43.10 PTSD (POST-TRAUMATIC STRESS DISORDER): ICD-10-CM

## 2025-06-05 DIAGNOSIS — F39 MOOD DISORDER (HCC): ICD-10-CM

## 2025-06-05 NOTE — TELEPHONE ENCOUNTER
Mireya wanted an update on whether or not [employer] replied to the email sent by BEV yesterday. [facility] promised that when she heard back she would call Mireya

## 2025-06-05 NOTE — PSYCH
Mireya Gandhi  06/05/25  Start Time: 1600  Stop Time: 1625  Total Visit Time: 25 minutes    Visit type: In person    Recovery needs addressed during session: Basic Needs    Notes (DAP Format)  Mireya came because she needs help with rent for just this month. Mireya moved into a loft a few months ago and she loves it, but can't afford this month because of paying people to help her move. [facility] and [facility] made sure Mireya understood that she will not be able to get this help again if she spends the money on anything but her rent. Mireya said that she understood.    Referrals made during this visit [facility]    Recovery connections: [facility]    Next appointment 6/5/2025 1pm

## 2025-06-05 NOTE — PROGRESS NOTES
Psychotherapy Discharge Summary    Preferred Name: Mireya Gandhi  YOB: 1975    Admission date to psychotherapy: 11/6/2023    Referred by: [facility] tox     Presenting Problem: Mireya reported she was having personal issues with her boyfriend and his child. Mireya states she was looking for someone to talk to about these issues.  Mireya expressed depression and anxiety due to these issues.    Course of treatment included : individual therapy     Progress/Outcome of Treatment Goals (brief summary of course of treatment) Mireya attended multiple sessions but struggled with consistent attendance. During treatment Mireya's significant other had passed and Mireya was able to manage the grief of his passing.  Mireya has not been seen for therapy in over 5 months.     Treatment Complications (if any): Mireya was consistently using crack cocaine during therapy and was not responsive to any recommendations regarding increase in substance abuse treatment to address this use.     Treatment Progress: fair    Current [facility] Psychiatric Provider: n/a    Discharge Medications include: n/a    Discharge Date: 6/5/2025    Discharge Diagnosis:   1. Opioid use disorder        2. Mood disorder (HCC)        3. PTSD (post-traumatic stress disorder)        4. Risk taking behavior            Criteria for Discharge: two or more unexcused absences for services    Patient is cleared to return to [facility] for continued treatment.    Rationale: Mireya can return to therapy as needed.    Aftercare recommendations include (include specific referral names and phone numbers, if appropriate): Outpatient Mental Health - Adult  [facility], [facility] [address], [place]                                              [phone]  Pedro Pablo Ortiz MD, [address] [facility], [place]                                           [phone]  [facility], [address] [place], [place] [address]                                                           [phone]  Oscar Guillen MD, [address], [place]                                  [phone]  Alejandro Mmebreno MD, [address], [place]                                         [phone]  Outpatient Mental Health- [facility], [address] [facility] [phone]  Drug and Alcohol Services  [facility]                                                     [phone] or [phone]  [facility]                                        [phone] or [phone]  [facility]                                                                              [phone]  [facility]                                                                                           [phone]  County Crisis Numbers  [facility]                                         [phone]  [facility]                            [phone] or [phone]  [phone] or [phone]  [facility]                                           [phone]  [facility]                                           [phone]  [facility]                     [phone] (877-9WEHELP)  [facility])                [phone]  National Suicide Prevention Hotline  4 (542) 247-4770 (TALK)  Text 'HOME' to 436102  Call 9-8-8    Prognosis: poor

## 2025-06-06 ENCOUNTER — TELEPHONE (OUTPATIENT)
Dept: PSYCHIATRY | Facility: CLINIC | Age: 50
End: 2025-06-06

## 2025-06-06 NOTE — TELEPHONE ENCOUNTER
Mireya called and left a VM at 4:39 pm yesterday, requesting a call back from ALBAN Amaya    For your review.

## 2025-06-06 NOTE — TELEPHONE ENCOUNTER
Mireya called back requesting a call back. Informed Mireya that the providers have left for the day. She is requesting a call back soon.    For your review.

## 2025-06-09 ENCOUNTER — TELEPHONE (OUTPATIENT)
Dept: PSYCHIATRY | Facility: CLINIC | Age: 50
End: 2025-06-09

## 2025-06-09 NOTE — TELEPHONE ENCOUNTER
Patient called requesting to speak with BEV Landeros, writer routed a message to Leti to call this patient back.

## 2025-06-10 ENCOUNTER — TELEPHONE (OUTPATIENT)
Dept: PSYCHIATRY | Facility: CLINIC | Age: 50
End: 2025-06-10

## 2025-06-10 NOTE — TELEPHONE ENCOUNTER
This is patient called requesting to speak with CM Leti MCKENZIE, writer informed pt that a message will be sent to CM for a call back.

## 2025-06-17 ENCOUNTER — TELEPHONE (OUTPATIENT)
Dept: PSYCHIATRY | Facility: CLINIC | Age: 50
End: 2025-06-17

## 2025-06-17 NOTE — TELEPHONE ENCOUNTER
MAMTA returned a call to Mireya. Mireya needs a proof of services letter for daybreak. Mireya made an appointment to come in tomorrow.

## 2025-06-18 ENCOUNTER — OFFICE VISIT (OUTPATIENT)
Dept: PSYCHIATRY | Facility: CLINIC | Age: 50
End: 2025-06-18
Payer: COMMERCIAL

## 2025-06-18 DIAGNOSIS — F11.90 OPIOID USE DISORDER: Primary | ICD-10-CM

## 2025-06-18 PROCEDURE — H0047 ALCOHOL/DRUG ABUSE SVC NOS: HCPCS

## 2025-06-19 ENCOUNTER — TELEPHONE (OUTPATIENT)
Dept: DENTISTRY | Facility: CLINIC | Age: 50
End: 2025-06-19

## 2025-06-19 NOTE — PSYCH
Initially, I would have her try supplemental vitamin B6 which she can get over-the-counter.  She can also use osiris candies or osiris tea.  She should try to always keep some sort of food on hand and eat small amounts throughout the day to try to prevent the nausea.  If this is still not working, she can get some over-the-counter Unisom and take 1 tablet of that with the vitamin B6.  Let her know this will make her drowsy but it should help with the nausea    Jenny Lambert M.D.     Mireya Gandhi  06/19/25  Start Time: 1403  Stop Time: 1515  Total Visit Time: 72 minutes  Lyn OLEARY    Visit type: In person    Recovery needs addressed during session: Basic Needs    Notes (DAP Format)  Mireya needed help with proof of services and getting into her [employer] account for proof of benefits.CM and CR[employer] tried helping her and she got locked out of her [employer] account and they continued to try to help Mireya. Mireya also had to get in touch with [email or web address] regarding a check that was made out to her landlord for this months rent.    Referrals made during this visit N/A    Recovery connections: [facility]    Next appointment TBD

## 2025-06-19 NOTE — TELEPHONE ENCOUNTER
Patient left message asking about her appointment. Called patient back and was not able to leave a voicemail since patient was not able to answer.

## 2025-06-23 ENCOUNTER — TELEPHONE (OUTPATIENT)
Dept: PSYCHIATRY | Facility: CLINIC | Age: 50
End: 2025-06-23

## 2025-06-23 NOTE — TELEPHONE ENCOUNTER
Unable to leave voicemail informing patient of the Psych Encounter form needing to be signed as a requirement from the insurance company for billing purposes. If patients contacts office, please make patient aware that the form can be accessed via [facility] to sign electronically and must be signed for each visit as a requirement to continue future visits with provider.

## 2025-06-23 NOTE — TELEPHONE ENCOUNTER
Pt returning call in regard to 6/18 psych encounter form. Writer relayed to pt that a signature is needed on this form, which can be done through [facility]. Pt advised writer she signed encounter form prior to leaving the office that day. Writer reviewed chart, signed encounter form received and scanned into chart today. Writer advised pt the encounter form for 6/18 is signed. Nothing further is needed at this time.

## 2025-07-09 ENCOUNTER — TELEPHONE (OUTPATIENT)
Dept: PSYCHIATRY | Facility: CLINIC | Age: 50
End: 2025-07-09

## 2025-07-09 NOTE — TELEPHONE ENCOUNTER
Mireya called and left a VM requesting a call back. Attempt made to call her back but there was no answer and no VM.

## 2025-07-22 ENCOUNTER — TELEPHONE (OUTPATIENT)
Dept: PSYCHIATRY | Facility: CLINIC | Age: 50
End: 2025-07-22

## 2025-07-22 DIAGNOSIS — F11.90 OPIOID USE DISORDER: ICD-10-CM

## 2025-07-22 RX ORDER — BUPRENORPHINE AND NALOXONE 8; 2 MG/1; MG/1
8 FILM, SOLUBLE BUCCAL; SUBLINGUAL 2 TIMES DAILY
Qty: 60 FILM | Refills: 0 | Status: SHIPPED | OUTPATIENT
Start: 2025-07-22 | End: 2025-08-21

## 2025-07-22 NOTE — TELEPHONE ENCOUNTER
Mireya called and left a VM requesting a C/B. Called back and left a VM for Mireya, returning her call. [facility] office number provided.

## 2025-07-22 NOTE — TELEPHONE ENCOUNTER
Medication Refill Request for:  Suboxone 8-2 mg film    When was the patient last seen by MAT provider?  5/19/25    Have they had any cancellations or no-shows since the last visit? Yes [x] No []  6/2/25    When is the next appointment scheduled? 7/28/25    Verified pharmacy   [x]    Verified ordering Provider   [x]    Does patient have enough for the next 3 days? Yes [] No [x]

## 2025-07-28 ENCOUNTER — OFFICE VISIT (OUTPATIENT)
Dept: OTHER | Age: 50
End: 2025-07-28
Payer: COMMERCIAL

## 2025-07-28 VITALS
SYSTOLIC BLOOD PRESSURE: 172 MMHG | BODY MASS INDEX: 19.55 KG/M2 | HEART RATE: 78 BPM | WEIGHT: 132 LBS | HEIGHT: 69 IN | DIASTOLIC BLOOD PRESSURE: 100 MMHG

## 2025-07-28 DIAGNOSIS — R03.0 ELEVATED BP WITHOUT DIAGNOSIS OF HYPERTENSION: ICD-10-CM

## 2025-07-28 DIAGNOSIS — F11.90 OPIOID USE DISORDER: Primary | ICD-10-CM

## 2025-07-28 PROCEDURE — 99213 OFFICE O/P EST LOW 20 MIN: CPT

## 2025-07-31 PROBLEM — R03.0 ELEVATED BP WITHOUT DIAGNOSIS OF HYPERTENSION: Status: ACTIVE | Noted: 2025-07-31

## 2025-08-18 DIAGNOSIS — F11.90 OPIOID USE DISORDER: ICD-10-CM

## 2025-08-18 RX ORDER — BUPRENORPHINE AND NALOXONE 8; 2 MG/1; MG/1
8 FILM, SOLUBLE BUCCAL; SUBLINGUAL 2 TIMES DAILY
Qty: 60 FILM | Refills: 0 | Status: SHIPPED | OUTPATIENT
Start: 2025-08-18 | End: 2025-09-17